# Patient Record
Sex: MALE | Race: BLACK OR AFRICAN AMERICAN | NOT HISPANIC OR LATINO | Employment: PART TIME | URBAN - NONMETROPOLITAN AREA
[De-identification: names, ages, dates, MRNs, and addresses within clinical notes are randomized per-mention and may not be internally consistent; named-entity substitution may affect disease eponyms.]

---

## 2017-02-02 RX ORDER — TAMSULOSIN HYDROCHLORIDE 0.4 MG/1
1 CAPSULE ORAL NIGHTLY
Status: ON HOLD | COMMUNITY
End: 2017-11-21

## 2017-02-06 ENCOUNTER — OFFICE VISIT (OUTPATIENT)
Dept: GASTROENTEROLOGY | Facility: CLINIC | Age: 67
End: 2017-02-06

## 2017-02-06 VITALS
WEIGHT: 181 LBS | HEART RATE: 57 BPM | DIASTOLIC BLOOD PRESSURE: 70 MMHG | BODY MASS INDEX: 25.34 KG/M2 | HEIGHT: 71 IN | OXYGEN SATURATION: 99 % | SYSTOLIC BLOOD PRESSURE: 128 MMHG

## 2017-02-06 DIAGNOSIS — I10 HTN (HYPERTENSION), BENIGN: ICD-10-CM

## 2017-02-06 DIAGNOSIS — Z12.11 ENCOUNTER FOR SCREENING FOR MALIGNANT NEOPLASM OF COLON: Primary | ICD-10-CM

## 2017-02-06 PROCEDURE — S0260 H&P FOR SURGERY: HCPCS | Performed by: CLINICAL NURSE SPECIALIST

## 2017-02-06 NOTE — PROGRESS NOTES
Chief Complaint   Patient presents with   • Colonoscopy     Subjective   HPI  Geoff Edwards is a 66 y.o. male who presents as a referral for preventative maintenance. He has no complaints of nausea or vomiting. No change in bowels. No wt loss. No BRBPR. No melena. There is a negative family hx for colon cancer. No abdominal pain.  Past Medical History   Diagnosis Date   • Abnormal PSA    • Diabetes mellitus    • Hypertension      Past Surgical History   Procedure Laterality Date   • Appendectomy     • Mandible surgery     • Colonoscopy  11/16/2012     Normal. Repeat in 5 years. Dr. Tez Perdomo     Outpatient Prescriptions Marked as Taking for the 2/6/17 encounter (Office Visit) with MARIA DOLORES Brower   Medication Sig Dispense Refill   • lisinopril (PRINIVIL,ZESTRIL) 10 MG tablet   3     No Known Allergies  Social History     Social History   • Marital status:      Spouse name: N/A   • Number of children: N/A   • Years of education: N/A     Occupational History   • Not on file.     Social History Main Topics   • Smoking status: Never Smoker   • Smokeless tobacco: Never Used   • Alcohol use No   • Drug use: No   • Sexual activity: Defer     Other Topics Concern   • Not on file     Social History Narrative     Family History   Problem Relation Age of Onset   • Heart disease Father    • No Known Problems Mother    • Cancer Sister    • No Known Problems Brother    • No Known Problems Brother    • No Known Problems Sister    • No Known Problems Sister    • No Known Problems Sister    • No Known Problems Sister    • Stomach cancer Sister    • Colon cancer Neg Hx    • Colon polyps Neg Hx      Health Maintenance   Topic Date Due   • HEPATITIS C SCREENING  1950   • MEDICARE ANNUAL WELLNESS  1950   • TDAP/TD VACCINES (1 - Tdap) 06/01/1967   • ZOSTER VACCINE  06/01/2008   • PNEUMOCOCCAL VACCINES (65+ LOW/MEDIUM RISK) (1 of 2 - PCV13) 06/01/2013   • INFLUENZA VACCINE  08/01/2016       REVIEW OF  "SYSTEMS  General: well appearing, no fever chills or sweats, no unexplained wt loss  HEENT: no acute visual or hearing disturbances  Cardiovascular: No chest pain or palpitations  Pulmonary: No shortness of breath, coughing, wheezing or hemoptysis  : No burning, urgency, hematuria, or dysuria  Musculoskeletal: No joint pain or stiffness  Peripheral: no edema  Skin: No lesions or rashes  Neuro: No dizziness, headaches, stroke, syncope  Endocrine: No hot or cold intolerances  Hematological: No blood dyscrasias    Objective   Vitals:    02/06/17 0856   BP: 128/70   Pulse: 57   SpO2: 99%   Weight: 181 lb (82.1 kg)   Height: 71\" (180.3 cm)     Body mass index is 25.24 kg/(m^2).    PHYSICAL EXAM  General: age appropriate well nourished well appearing, no acute distress  Head: normocephalic and atraumatic  Global assessment-supple  Neck-No JVD noted, no lymphadenopathy  Pulmonary-clear to auscultation bilaterally, normal respiratory effort  Cardiovascular-normal rate and rhythm, normal heart sounds, S1 and S2 noted  Abdomen-soft, non tender, non distended, normal bowel sounds all 4 quadrants, no hepatosplenomegaly noted  Extremities-No clubbing cyanosis or edema  Neuro-Non focal, converses appropriately, awake, alert, oriented    Assessment/Plan     Geoff was seen today for colonoscopy.    Diagnoses and all orders for this visit:    Encounter for screening for malignant neoplasm of colon  -     Case Request; Standing  -     Implement Anesthesia Orders Day of Procedure; Standing  -     Obtain Informed Consent; Standing  -     Verify Informed Consent; Standing  -     Verify bowel prep was successful; Standing  -     Case Request  -     Case Request; Standing  -     Implement Anesthesia Orders Day of Procedure; Standing  -     Obtain Informed Consent; Standing  -     Verify Informed Consent; Standing  -     Verify bowel prep was successful; Standing  -     Case Request    HTN (hypertension), benign  Comments:  continue BP " medication the day of procedure    Other orders  -     polyethylene glycol (GoLYTELY) 236 G solution; As directed by office instruction        COLONOSCOPY WITH ANESTHESIA (N/A)  Body mass index is 25.24 kg/(m^2).    Patient instructions on prep prior to procedure provided to the patient.    All risks, benefits, alternatives, and indications of colonoscopy procedure have been discussed with the patient. Risks to include perforation of the colon requiring possible surgery or colostomy, risk of bleeding from biopsies or removal of colon tissue, possibility of missing a colon polyp or cancer, or adverse drug reaction.  Benefits to include the diagnosis and management of disease of the colon and rectum. Alternatives to include barium enema, radiographic evaluation, lab testing or no intervention. Pt verbalizes understanding and agrees.

## 2017-04-21 ENCOUNTER — TELEPHONE (OUTPATIENT)
Dept: GASTROENTEROLOGY | Facility: CLINIC | Age: 67
End: 2017-04-21

## 2017-04-21 NOTE — TELEPHONE ENCOUNTER
Spoke with PT. He thought his procedure was scheduled for Nov. I looked but did not see this.  I forwarded call to Miracle so he may schedule his colonoscopy. Open case request from 2/6/17. Will re-check with Miracle later.

## 2017-04-21 NOTE — TELEPHONE ENCOUNTER
Patient is not due for colonoscopy until 11/2017.  Cecily Aceves put in a case request for it and it has been deferred.  Recall notice says that he doesn't have to come back into the office prior to appt.

## 2017-11-21 ENCOUNTER — HOSPITAL ENCOUNTER (OUTPATIENT)
Facility: HOSPITAL | Age: 67
Setting detail: HOSPITAL OUTPATIENT SURGERY
Discharge: HOME | End: 2017-11-21
Attending: INTERNAL MEDICINE | Admitting: INTERNAL MEDICINE

## 2017-11-21 ENCOUNTER — ANESTHESIA EVENT (OUTPATIENT)
Dept: GASTROENTEROLOGY | Facility: HOSPITAL | Age: 67
End: 2017-11-21

## 2017-11-21 ENCOUNTER — ANESTHESIA (OUTPATIENT)
Dept: GASTROENTEROLOGY | Facility: HOSPITAL | Age: 67
End: 2017-11-21

## 2017-11-21 VITALS
BODY MASS INDEX: 25.06 KG/M2 | DIASTOLIC BLOOD PRESSURE: 92 MMHG | HEIGHT: 71 IN | WEIGHT: 179 LBS | RESPIRATION RATE: 16 BRPM | OXYGEN SATURATION: 100 % | TEMPERATURE: 98.1 F | HEART RATE: 65 BPM | SYSTOLIC BLOOD PRESSURE: 143 MMHG

## 2017-11-21 DIAGNOSIS — Z12.11 ENCOUNTER FOR SCREENING FOR MALIGNANT NEOPLASM OF COLON: ICD-10-CM

## 2017-11-21 PROCEDURE — 25010000002 PROPOFOL 10 MG/ML EMULSION: Performed by: NURSE ANESTHETIST, CERTIFIED REGISTERED

## 2017-11-21 PROCEDURE — G0121 COLON CA SCRN NOT HI RSK IND: HCPCS | Performed by: INTERNAL MEDICINE

## 2017-11-21 RX ORDER — PROPOFOL 10 MG/ML
VIAL (ML) INTRAVENOUS AS NEEDED
Status: DISCONTINUED | OUTPATIENT
Start: 2017-11-21 | End: 2017-11-21 | Stop reason: SURG

## 2017-11-21 RX ORDER — SODIUM CHLORIDE 9 MG/ML
500 INJECTION, SOLUTION INTRAVENOUS CONTINUOUS PRN
Status: DISCONTINUED | OUTPATIENT
Start: 2017-11-21 | End: 2017-11-21 | Stop reason: HOSPADM

## 2017-11-21 RX ORDER — LIDOCAINE HYDROCHLORIDE 20 MG/ML
INJECTION, SOLUTION INFILTRATION; PERINEURAL AS NEEDED
Status: DISCONTINUED | OUTPATIENT
Start: 2017-11-21 | End: 2017-11-21 | Stop reason: SURG

## 2017-11-21 RX ORDER — SODIUM CHLORIDE 0.9 % (FLUSH) 0.9 %
3 SYRINGE (ML) INJECTION AS NEEDED
Status: DISCONTINUED | OUTPATIENT
Start: 2017-11-21 | End: 2017-11-21 | Stop reason: HOSPADM

## 2017-11-21 RX ADMIN — PROPOFOL 320 MG: 10 INJECTION, EMULSION INTRAVENOUS at 08:43

## 2017-11-21 RX ADMIN — LIDOCAINE HYDROCHLORIDE 50 MG: 20 INJECTION, SOLUTION INFILTRATION; PERINEURAL at 08:43

## 2017-11-21 RX ADMIN — LIDOCAINE HYDROCHLORIDE 0.5 ML: 10 INJECTION, SOLUTION EPIDURAL; INFILTRATION; INTRACAUDAL; PERINEURAL at 07:33

## 2017-11-21 RX ADMIN — SODIUM CHLORIDE 500 ML: 9 INJECTION, SOLUTION INTRAVENOUS at 07:32

## 2017-11-21 NOTE — PLAN OF CARE
Problem: Patient Care Overview (Adult)  Goal: Plan of Care Review  Outcome: Outcome(s) achieved Date Met:  11/21/17 11/21/17 0915   Coping/Psychosocial Response Interventions   Plan Of Care Reviewed With patient;spouse   Patient Care Overview   Progress improving   Outcome Evaluation   Outcome Summary/Follow up Plan D/C CRITERIA MET

## 2017-11-21 NOTE — ANESTHESIA PREPROCEDURE EVALUATION
Anesthesia Evaluation     Patient summary reviewed   history of anesthetic complications: PONV         Airway   Mallampati: II  TM distance: >3 FB  Neck ROM: full  no difficulty expected  Dental - normal exam     Pulmonary    (-) COPD, asthma, sleep apnea, not a smoker  Cardiovascular   Exercise tolerance: good (4-7 METS)    (+) hypertension,       Neuro/Psych  (-) seizures, TIA, CVA  GI/Hepatic/Renal/Endo    (+)  diabetes mellitus (borderline),   (-) liver disease, no renal disease    Musculoskeletal     Abdominal    Substance History      OB/GYN          Other                                        Anesthesia Plan    ASA 2     general   total IV anesthesia  intravenous induction   Anesthetic plan and risks discussed with patient.

## 2017-11-21 NOTE — PLAN OF CARE
Problem: Patient Care Overview (Adult)  Goal: Plan of Care Review  Outcome: Ongoing (interventions implemented as appropriate)    11/21/17 0850   Coping/Psychosocial Response Interventions   Plan Of Care Reviewed With patient   Patient Care Overview   Progress improving   Outcome Evaluation   Outcome Summary/Follow up Plan no noted problems

## 2017-11-21 NOTE — ANESTHESIA POSTPROCEDURE EVALUATION
Patient: Geoff Edwards    Procedure Summary     Date Anesthesia Start Anesthesia Stop Room / Location    11/21/17 0840 0859  [facility] / [facility]       Procedure Diagnosis Surgeon Provider    COLONOSCOPY WITH ANESTHESIA (N/A ) Encounter for screening for malignant neoplasm of colon  (Encounter for screening for malignant neoplasm of colon [Z12.11]) MD Sera Cabrera CRNA          Anesthesia Type: general  Last vitals  BP   112/84 (11/21/17 0915)   Temp   98.1 °F (36.7 °C) (11/21/17 0722)   Pulse   73 (11/21/17 0915)   Resp   20 (11/21/17 0915)     SpO2   100 % (11/21/17 0915)     Post Anesthesia Care and Evaluation    Patient location during evaluation: PHASE II  Patient participation: complete - patient participated  Level of consciousness: awake and alert  Pain score: 0  Pain management: satisfactory to patient  Airway patency: patent  Anesthetic complications: No anesthetic complications  PONV Status: none  Cardiovascular status: acceptable  Respiratory status: acceptable  Hydration status: acceptable

## 2017-11-21 NOTE — PLAN OF CARE
Problem: GI Endoscopy (Adult)  Goal: Signs and Symptoms of Listed Potential Problems Will be Absent or Manageable (GI Endoscopy)  Outcome: Outcome(s) achieved Date Met:  11/21/17

## 2017-11-21 NOTE — PLAN OF CARE
Problem: GI Endoscopy (Adult)  Goal: Signs and Symptoms of Listed Potential Problems Will be Absent or Manageable (GI Endoscopy)  Outcome: Ongoing (interventions implemented as appropriate)    11/21/17 6642   GI Endoscopy   Problems Assessed (GI Endoscopy) all   Problems Present (GI Endoscopy) none

## 2017-11-21 NOTE — PLAN OF CARE
Problem: Patient Care Overview (Adult)  Goal: Adult Individualization and Mutuality  Outcome: Ongoing (interventions implemented as appropriate)    11/21/17 0721   Individualization   Patient Specific Preferences none   Patient Specific Goals none   Patient Specific Interventions none   Mutuality/Individual Preferences   What Anxieties, Fears or Concerns Do You Have About Your Health or Care? none   What Questions Do You Have About Your Health or Care? none   What Information Would Help Us Give You More Personalized Care? none

## 2017-11-21 NOTE — H&P
"[facility]  Pre Procedure History & Physical    Chief Complaint:   Screening    Subjective     HPI:   Here for screening colonoscopy.  See office note dated 2/6/2017    Past Medical History:   Past Medical History:   Diagnosis Date   • Abnormal PSA    • Diabetes mellitus    • Hypertension    • PONV (postoperative nausea and vomiting)        Past Surgical History:  [unfilled]    Family History:  Family History   Problem Relation Age of Onset   • Heart disease Father    • No Known Problems Mother    • Cancer Sister    • No Known Problems Brother    • No Known Problems Brother    • No Known Problems Sister    • No Known Problems Sister    • No Known Problems Sister    • No Known Problems Sister    • Stomach cancer Sister    • Colon cancer Neg Hx    • Colon polyps Neg Hx        Social History:   reports that he has never smoked. He has never used smokeless tobacco. He reports that he does not drink alcohol or use illicit drugs.    Medications:   Prior to Admission medications    Medication Sig Start Date End Date Taking? Authorizing Provider   polyethylene glycol (GoLYTELY) 236 G solution As directed by office instruction 2/6/17  Yes MARIA DOLORES Brower   aspirin 81 MG tablet Take 81 mg by mouth Daily.    Historical Provider, MD   lisinopril (PRINIVIL,ZESTRIL) 10 MG tablet  10/21/16   Historical Provider, MD   tamsulosin (FLOMAX) 0.4 MG capsule 24 hr capsule Take 1 capsule by mouth Every Night.  11/21/17  Historical Provider, MD       Allergies:  Review of patient's allergies indicates no known allergies.    Objective     Blood pressure 156/92, pulse 68, temperature 98.1 °F (36.7 °C), temperature source Temporal Artery , resp. rate 18, height 71\" (180.3 cm), weight 179 lb (81.2 kg), SpO2 99 %.    Physical Exam   Constitutional: Pt is oriented to person, place, and in no distress.   HENT: Mouth/Throat: Oropharynx is clear.   Cardiovascular: Normal rate, regular rhythm.    Pulmonary/Chest: Effort " normal. No respiratory distress. No  wheezes.   Abdominal: Soft. Non-distended.  Skin: Skin is warm and dry.   Psychiatric: Mood, memory, affect and judgment appear normal.     Assessment/Plan     Diagnosis:  Screening colonoscopy    Anticipated Surgical Procedure:    Proceed with colonoscopy as scheduled    The following major R/B/A were discussed with the patient, however the list is not all inclusive . Risk:  Bleeding (immediate and delayed), perforation (rupture or tear), reaction to medication, missed lesion/cancer, pain during the procedure, infection, need for surgery, need for ostomy, need for mechanical ventilation (breathing machine), death.  Benefits: removal of polyp/tissue, burn/clip/or inject to stop bleeding, removal of foreign body, dilate any stricture.  Alternatives: Xray or CT, surgery, do nothing with associated risk   The patient was given time to ask question and received explanation, and agrees to proceed as per History and Physical.   No guarantee given or expressed.    EMR Dragon/transcription disclaimer: Much of this encounter note is an electronic transcription/translation of spoken language to printed text.  The electronic translation of spoken language may permit erroneous, or at times, nonsensical words or phrases to be inadvertently transcribed.  Although I have reviewed the note for such errors, some may still exist.    Tez Perdomo MD  8:37 AM  11/21/2017

## 2017-11-28 ENCOUNTER — TELEPHONE (OUTPATIENT)
Dept: GASTROENTEROLOGY | Facility: CLINIC | Age: 67
End: 2017-11-28

## 2017-12-02 ENCOUNTER — RESULTS ENCOUNTER (OUTPATIENT)
Dept: UROLOGY | Facility: CLINIC | Age: 67
End: 2017-12-02

## 2017-12-02 DIAGNOSIS — R97.20 ELEVATED PSA: ICD-10-CM

## 2017-12-07 LAB
PSA FREE MFR SERPL: 14.6 %
PSA FREE SERPL-MCNC: 1.04 NG/ML
PSA SERPL-MCNC: 7.1 NG/ML (ref 0–4)

## 2017-12-13 ENCOUNTER — OFFICE VISIT (OUTPATIENT)
Dept: UROLOGY | Facility: CLINIC | Age: 67
End: 2017-12-13

## 2017-12-13 VITALS
WEIGHT: 184.2 LBS | HEIGHT: 71 IN | DIASTOLIC BLOOD PRESSURE: 74 MMHG | BODY MASS INDEX: 25.79 KG/M2 | TEMPERATURE: 97.3 F | SYSTOLIC BLOOD PRESSURE: 128 MMHG

## 2017-12-13 DIAGNOSIS — N40.1 BPH WITH URINARY OBSTRUCTION: ICD-10-CM

## 2017-12-13 DIAGNOSIS — N13.8 BPH WITH URINARY OBSTRUCTION: ICD-10-CM

## 2017-12-13 DIAGNOSIS — R97.20 ELEVATED PSA: Primary | ICD-10-CM

## 2017-12-13 LAB
BILIRUB BLD-MCNC: NEGATIVE MG/DL
CLARITY, POC: CLEAR
COLOR UR: YELLOW
GLUCOSE UR STRIP-MCNC: NEGATIVE MG/DL
KETONES UR QL: NEGATIVE
LEUKOCYTE EST, POC: NEGATIVE
NITRITE UR-MCNC: NEGATIVE MG/ML
PH UR: 5 [PH] (ref 5–8)
PROT UR STRIP-MCNC: NEGATIVE MG/DL
RBC # UR STRIP: ABNORMAL /UL
SP GR UR: 1.02 (ref 1–1.03)
UROBILINOGEN UR QL: NORMAL

## 2017-12-13 PROCEDURE — 99214 OFFICE O/P EST MOD 30 MIN: CPT | Performed by: UROLOGY

## 2017-12-13 PROCEDURE — 81003 URINALYSIS AUTO W/O SCOPE: CPT | Performed by: UROLOGY

## 2017-12-13 NOTE — PROGRESS NOTES
Subjective    Mr. Edwards is 67 y.o. male    Chief Complaint: Elevated PSA    History of Present Illness     Elevated PSA  Patient is here with an elevated PSA. The PSA was drawn10 days. He does not have a family history of prostate cancer. His AUA Symptom Score is 7 /35. Voiding symptoms include Incomplete emptying, Frequency, Intermittency, Weakened stream and Nocturia. Denies Urgency and Straining. Voiding symptoms began several years  . These have been gradual in onset. negative--10/12 for PSA of 7.  Previous PSA values are : 7.1 % free 14.6   Benign Prostatic Hypertrophy  Patient complains of lower urinary tract symptoms. He reports frequency, incomplete emptying, intermittency, nocturia one time a night and weak stream. He denies straining and urgency. Patient states symptoms are of mild severity. Onset of symptoms was several years ago and was gradual in onset. His AUA Symptom Score is, 7/35.He reports a history of no complicating symptoms. He denies flank pain, gross hematuria, kidney stones and recurrent UTI.  Patient has tried Watchful waiting with improvement. Last PSA was 7.1 .       The following portions of the patient's history were reviewed and updated as appropriate: allergies, current medications, past family history, past medical history, past social history, past surgical history and problem list.    Review of Systems   Constitutional: Negative for chills and fever.   Gastrointestinal: Negative for abdominal pain, anal bleeding and blood in stool.   Genitourinary: Negative for flank pain and hematuria.         Current Outpatient Prescriptions:   •  lisinopril (PRINIVIL,ZESTRIL) 10 MG tablet, , Disp: , Rfl: 3    Past Medical History:   Diagnosis Date   • Abnormal PSA    • Diabetes mellitus    • Hypertension    • PONV (postoperative nausea and vomiting)        Past Surgical History:   Procedure Laterality Date   • APPENDECTOMY     • COLONOSCOPY  11/16/2012    Normal. Repeat in 5 years. Dr. Reagan  "Conor   • COLONOSCOPY N/A 11/21/2017    Procedure: COLONOSCOPY WITH ANESTHESIA;  Surgeon: Tez Perdomo MD;  Location: [facility] ENDOSCOPY;  Service:    • MANDIBLE SURGERY         Social History     Social History   • Marital status:      Spouse name: N/A   • Number of children: N/A   • Years of education: N/A     Social History Main Topics   • Smoking status: Never Smoker   • Smokeless tobacco: Never Used   • Alcohol use No   • Drug use: No   • Sexual activity: Defer     Other Topics Concern   • None     Social History Narrative       Family History   Problem Relation Age of Onset   • Heart disease Father    • No Known Problems Mother    • Cancer Sister    • No Known Problems Brother    • No Known Problems Brother    • No Known Problems Sister    • No Known Problems Sister    • No Known Problems Sister    • No Known Problems Sister    • Stomach cancer Sister    • Colon cancer Neg Hx    • Colon polyps Neg Hx        Objective    /74  Temp 97.3 °F (36.3 °C)  Ht 180.3 cm (71\")  Wt 83.6 kg (184 lb 3.2 oz)  BMI 25.69 kg/m2    Physical Exam   Constitutional: He is oriented to person, place, and time. He appears well-developed and well-nourished. No distress.   Pulmonary/Chest: Effort normal.   Abdominal: Soft. He exhibits no distension and no mass. There is no tenderness. There is no rebound and no guarding. No hernia.   Neurological: He is alert and oriented to person, place, and time.   Skin: Skin is warm and dry. He is not diaphoretic.   Psychiatric: He has a normal mood and affect.   Vitals reviewed.          Results for orders placed or performed in visit on 12/13/17   POC Urinalysis Dipstick, Automated   Result Value Ref Range    Color Yellow Yellow, Straw, Dark Yellow, Clarissa    Clarity, UA Clear Clear    Glucose, UA Negative Negative, 1000 mg/dL (3+) mg/dL    Bilirubin Negative Negative    Ketones, UA Negative Negative    Specific Gravity  1.025 1.005 - 1.030    Blood, UA Trace (A) Negative    pH, " Urine 5.0 5.0 - 8.0    Protein, POC Negative Negative mg/dL    Urobilinogen, UA Normal Normal    Leukocytes Negative Negative    Nitrite, UA Negative Negative     Assessment and Plan    Diagnoses and all orders for this visit:    Elevated PSA  -     POC Urinalysis Dipstick, Automated    BPH with urinary obstruction    Patient underwent a biopsy in 2012 for PSA of 7.  His PSA today is 7.1 with a percent free 14.6.  This indicates a proximally 35% chance of having a positive prostate biopsy.I had a long discussion with him regarding further management of this situation.  I have recommended a 4K score.  This can help us determine if we need to proceed with MRI ultrasound fusion guided prostate biopsy.  We will draw a 4K score today and he will follow follow based on the results of these.  A rectal exam was not performed today because we are drawing the 4K score.

## 2017-12-18 ENCOUNTER — TELEPHONE (OUTPATIENT)
Dept: UROLOGY | Facility: CLINIC | Age: 67
End: 2017-12-18

## 2017-12-18 NOTE — TELEPHONE ENCOUNTER
Rep from [facility] called regarding this pt's 4K score testing. She states that his form noted he previously has prostate cancer and the test can not be performed due to this. She wants to confirm that his past dx is correct. Her tel is [phone] and the ref # is .

## 2017-12-19 DIAGNOSIS — R97.20 ELEVATED PROSTATE SPECIFIC ANTIGEN (PSA): Primary | ICD-10-CM

## 2018-01-17 ENCOUNTER — HOSPITAL ENCOUNTER (OUTPATIENT)
Dept: GENERAL RADIOLOGY | Facility: HOSPITAL | Age: 68
Discharge: HOME | End: 2018-01-17
Attending: INTERNAL MEDICINE | Admitting: INTERNAL MEDICINE

## 2018-01-17 ENCOUNTER — TRANSCRIBE ORDERS (OUTPATIENT)
Dept: GENERAL RADIOLOGY | Facility: HOSPITAL | Age: 68
End: 2018-01-17

## 2018-01-17 DIAGNOSIS — M87.051 AVASCULAR NECROSIS OF BONE OF HIP, RIGHT (HCC): ICD-10-CM

## 2018-01-17 DIAGNOSIS — M87.051 AVASCULAR NECROSIS OF BONE OF HIP, RIGHT (HCC): Primary | ICD-10-CM

## 2018-01-17 PROCEDURE — 73502 X-RAY EXAM HIP UNI 2-3 VIEWS: CPT

## 2018-12-14 DIAGNOSIS — R97.20 ELEVATED PROSTATE SPECIFIC ANTIGEN (PSA): ICD-10-CM

## 2018-12-15 LAB
PSA FREE MFR SERPL: 17.4 %
PSA FREE SERPL-MCNC: 0.92 NG/ML
PSA SERPL-MCNC: 5.3 NG/ML (ref 0–4)

## 2019-01-03 NOTE — PROGRESS NOTES
Subjective    Mr. Edwards is 68 y.o. male    Chief Complaint: Elevated PSA    History of Present Illness   Elevated PSA  Patient is here with an elevated PSA. The PSA was drawn10 days. He does not have a family history of prostate cancer. His AUA Symptom Score is 6 /35. Voiding symptoms include Incomplete emptying, Frequency, Intermittency, Weakened stream and Nocturia. Denies Urgency and Straining. Voiding symptoms began several years  . These have been gradual in onset. negative--10/12 for PSA of 7.  Previous PSA values are :   Lab Results   Component Value Date    PSA 5.3 (H) 12/14/2018    PSA 7.1 (H) 12/06/2017    PSA 6.4 (H) 11/22/2016         Benign Prostatic Hypertrophy  Patient complains of lower urinary tract symptoms. He reports frequency, incomplete emptying, intermittency, nocturia one time a night and weak stream. He denies straining and urgency. Patient states symptoms are of mild severity. Onset of symptoms was several years ago and was gradual in onset. His AUA Symptom Score is, 6/35.He reports a history of no complicating symptoms. He denies flank pain, gross hematuria, kidney stones and recurrent UTI.  Patient has tried Watchful waiting with improvement. Last PSA was 5.3 .      The following portions of the patient's history were reviewed and updated as appropriate: allergies, current medications, past family history, past medical history, past social history, past surgical history and problem list.    Review of Systems   Constitutional: Negative for chills and fever.   Gastrointestinal: Negative for abdominal pain, anal bleeding and blood in stool.   Genitourinary: Negative for decreased urine volume, difficulty urinating, discharge, dysuria, enuresis, flank pain, frequency, genital sores, hematuria, penile pain, penile swelling, scrotal swelling, testicular pain and urgency.         Current Outpatient Medications:   •  lisinopril (PRINIVIL,ZESTRIL) 10 MG tablet, , Disp: , Rfl: 3    Past  "Medical History:   Diagnosis Date   • Abnormal PSA    • Diabetes mellitus (CMS/HCC)    • Hypertension    • PONV (postoperative nausea and vomiting)        Past Surgical History:   Procedure Laterality Date   • APPENDECTOMY     • COLONOSCOPY  11/16/2012    Normal. Repeat in 5 years. Dr. Tez Perdomo   • COLONOSCOPY N/A 11/21/2017    Procedure: COLONOSCOPY WITH ANESTHESIA;  Surgeon: Tez Perdomo MD;  Location: [facility] ENDOSCOPY;  Service:    • MANDIBLE SURGERY         Social History     Socioeconomic History   • Marital status:      Spouse name: Not on file   • Number of children: Not on file   • Years of education: Not on file   • Highest education level: Not on file   Tobacco Use   • Smoking status: Never Smoker   • Smokeless tobacco: Never Used   Substance and Sexual Activity   • Alcohol use: No   • Drug use: No   • Sexual activity: Defer       Family History   Problem Relation Age of Onset   • Heart disease Father    • No Known Problems Mother    • Cancer Sister    • No Known Problems Brother    • No Known Problems Brother    • No Known Problems Sister    • No Known Problems Sister    • No Known Problems Sister    • No Known Problems Sister    • Stomach cancer Sister    • Colon cancer Neg Hx    • Colon polyps Neg Hx        Objective    Temp 96.9 °F (36.1 °C)   Ht 180.3 cm (71\")   Wt 79.4 kg (175 lb)   BMI 24.41 kg/m²     Physical Exam   Constitutional: He is oriented to person, place, and time. He appears well-developed and well-nourished.   Pulmonary/Chest: Effort normal.   Abdominal: Soft. He exhibits no distension and no mass. There is no tenderness. There is no rebound and no guarding. No hernia.   Genitourinary: Penis normal. Rectal exam shows no mass, no tenderness and anal tone normal. Enlarged: for the age of the patient. Right testis shows no mass, no swelling and no tenderness. Left testis shows no mass, no swelling and no tenderness. No hypospadias. No discharge found.   Genitourinary " Comments:  The urethral meatus normal in position without evidence of stricture. Epididymis without mass or tenderness. Vas Deferens is palpably normal.Anus and perineum without mass or tenderness. The prostate is approximately 60 ml. It is Symmetric, with a Soft consistency. There are no nodules present. . The seminal vesicles are Not palpable due to the size of the prostate.     Neurological: He is alert and oriented to person, place, and time.   Vitals reviewed.          Results for orders placed or performed in visit on 01/04/19   POC Urinalysis Dipstick, Multipro   Result Value Ref Range    Color Yellow Yellow, Straw, Dark Yellow, Clarissa    Clarity, UA Clear Clear    Glucose, UA Negative Negative, 1000 mg/dL (3+) mg/dL    Bilirubin Negative Negative    Ketones, UA Negative Negative    Specific Gravity  1.025 1.005 - 1.030    Blood, UA Trace (A) Negative    pH, Urine 5.5 5.0 - 8.0    Protein, POC Negative Negative mg/dL    Urobilinogen, UA Normal Normal    Nitrite, UA Negative Negative    Leukocytes Negative Negative   Patient's Body mass index is 24.41 kg/m². BMI is within normal parameters. No follow-up required.  Assessment and Plan    Diagnoses and all orders for this visit:    Elevated prostate specific antigen (PSA)  -     POC Urinalysis Dipstick, Multipro  -     PSA, Total & Free; Future    BPH with urinary obstruction    Patient underwent a biopsy in 2012 for PSA of 7.   he had a 4K score of 4 in 2017 which indicates a very low risk prostate cancer.      His PSA today is 5.3 with a percent free of 17.4% and this indicates a 23% chance of having a positive biopsy.  He has had no change in his voiding symptoms are stable and do not require pharmacologic therapy.    I discussed with him his if his PSA were to increase we will proceed with an MRI first to see there is any clinically significant lesions.    He will follow-up with me in 1 year with repeat PSA testing.

## 2019-01-04 ENCOUNTER — OFFICE VISIT (OUTPATIENT)
Dept: UROLOGY | Facility: CLINIC | Age: 69
End: 2019-01-04

## 2019-01-04 VITALS — TEMPERATURE: 96.9 F | BODY MASS INDEX: 24.5 KG/M2 | HEIGHT: 71 IN | WEIGHT: 175 LBS

## 2019-01-04 DIAGNOSIS — N13.8 BPH WITH URINARY OBSTRUCTION: ICD-10-CM

## 2019-01-04 DIAGNOSIS — N40.1 BPH WITH URINARY OBSTRUCTION: ICD-10-CM

## 2019-01-04 DIAGNOSIS — R97.20 ELEVATED PROSTATE SPECIFIC ANTIGEN (PSA): Primary | ICD-10-CM

## 2019-01-04 LAB
BILIRUB BLD-MCNC: NEGATIVE MG/DL
CLARITY, POC: CLEAR
COLOR UR: YELLOW
GLUCOSE UR STRIP-MCNC: NEGATIVE MG/DL
KETONES UR QL: NEGATIVE
LEUKOCYTE EST, POC: NEGATIVE
NITRITE UR-MCNC: NEGATIVE MG/ML
PH UR: 5.5 [PH] (ref 5–8)
PROT UR STRIP-MCNC: NEGATIVE MG/DL
RBC # UR STRIP: ABNORMAL /UL
SP GR UR: 1.02 (ref 1–1.03)
UROBILINOGEN UR QL: NORMAL

## 2019-01-04 PROCEDURE — 81001 URINALYSIS AUTO W/SCOPE: CPT | Performed by: UROLOGY

## 2019-01-04 PROCEDURE — 99213 OFFICE O/P EST LOW 20 MIN: CPT | Performed by: UROLOGY

## 2019-03-05 ENCOUNTER — HOSPITAL ENCOUNTER (OUTPATIENT)
Dept: GENERAL RADIOLOGY | Facility: HOSPITAL | Age: 69
Discharge: HOME | End: 2019-03-05

## 2019-03-05 PROCEDURE — 71046 X-RAY EXAM CHEST 2 VIEWS: CPT

## 2019-10-01 ENCOUNTER — OFFICE VISIT (OUTPATIENT)
Dept: OTOLARYNGOLOGY | Facility: CLINIC | Age: 69
End: 2019-10-01

## 2019-10-01 VITALS
RESPIRATION RATE: 17 BRPM | BODY MASS INDEX: 25.9 KG/M2 | TEMPERATURE: 97.8 F | OXYGEN SATURATION: 99 % | HEIGHT: 71 IN | DIASTOLIC BLOOD PRESSURE: 88 MMHG | SYSTOLIC BLOOD PRESSURE: 142 MMHG | HEART RATE: 56 BPM | WEIGHT: 185 LBS

## 2019-10-01 DIAGNOSIS — R09.89 GLOBUS PHARYNGEUS: ICD-10-CM

## 2019-10-01 DIAGNOSIS — K21.9 GERD WITHOUT ESOPHAGITIS: ICD-10-CM

## 2019-10-01 DIAGNOSIS — R05.9 COUGH: Primary | ICD-10-CM

## 2019-10-01 DIAGNOSIS — J34.2 ACQUIRED DEVIATED NASAL SEPTUM: ICD-10-CM

## 2019-10-01 DIAGNOSIS — J32.9 CHRONIC SINUSITIS, UNSPECIFIED LOCATION: ICD-10-CM

## 2019-10-01 PROCEDURE — 31575 DIAGNOSTIC LARYNGOSCOPY: CPT | Performed by: OTOLARYNGOLOGY

## 2019-10-01 PROCEDURE — 99204 OFFICE O/P NEW MOD 45 MIN: CPT | Performed by: OTOLARYNGOLOGY

## 2019-10-01 RX ORDER — OXYMETAZOLINE HYDROCHLORIDE 0.05 G/100ML
2 SPRAY NASAL 3 TIMES DAILY
Qty: 2 ML | Refills: 0 | Status: SHIPPED | OUTPATIENT
Start: 2019-10-01 | End: 2019-10-04

## 2019-10-01 RX ORDER — FLUTICASONE PROPIONATE 50 MCG
2 SPRAY, SUSPENSION (ML) NASAL 2 TIMES DAILY
Qty: 48 G | Refills: 3 | Status: SHIPPED | OUTPATIENT
Start: 2019-10-01 | End: 2020-08-10

## 2019-10-01 RX ORDER — IRBESARTAN 150 MG/1
150 TABLET ORAL NIGHTLY
COMMUNITY
End: 2020-01-21 | Stop reason: DRUGHIGH

## 2019-10-01 NOTE — PROGRESS NOTES
Fernando Solano Jr, MD     Chief Complaint   Patient presents with   • Cough     Cough        HISTORY OF PRESENT ILLNESS:   Accompanied by:   No one  Geoff Edwards is a  69 y.o. male with cough. He states present 3-4 weeks.  He feels he is not swallowing well.  Patient notes just since MD visit.  Swallowing issues began over the last 6 weeks. He has sensation in throat.  He denies congestion. White mucus.  Coughs mostly throughout the day. Maybe more at night.  Cough occasionally wakes.  He will cough more just before bed.  No fever  Denies heartburn. No reflux therapy  Pt has had ACE stopped with no resolution cough. Stopped one monthago.  CXR obtained.  Smoke- none  Drink none  Rx- none except BP medication change    Review of Systems  Reviewed per patient intake note and confirmed with patient    Past History:  Past Medical History:   Diagnosis Date   • Abnormal PSA    • Diabetes mellitus (CMS/HCC)    • Hypertension    • PONV (postoperative nausea and vomiting)      Past Surgical History:   Procedure Laterality Date   • APPENDECTOMY     • COLONOSCOPY  11/16/2012    Normal. Repeat in 5 years. Dr. Tez Perdomo   • COLONOSCOPY N/A 11/21/2017    Procedure: COLONOSCOPY WITH ANESTHESIA;  Surgeon: Tez Perdomo MD;  Location: [facility] ENDOSCOPY;  Service:    • MANDIBLE SURGERY       Family History   Problem Relation Age of Onset   • Heart disease Father    • No Known Problems Mother    • Cancer Sister    • No Known Problems Brother    • No Known Problems Brother    • No Known Problems Sister    • No Known Problems Sister    • No Known Problems Sister    • No Known Problems Sister    • Stomach cancer Sister    • Colon cancer Neg Hx    • Colon polyps Neg Hx      Social History     Tobacco Use   • Smoking status: Never Smoker   • Smokeless tobacco: Never Used   Substance Use Topics   • Alcohol use: No   • Drug use: No     Outpatient Medications Marked as Taking for the 10/1/19 encounter (Office Visit) with  Fernando Solano Jr., MD   Medication Sig Dispense Refill   • irbesartan (AVAPRO) 150 MG tablet Take 150 mg by mouth Every Night.       Allergies:  Patient has no known allergies.        Vital Signs:   Temp:  [97.8 °F (36.6 °C)] 97.8 °F (36.6 °C)  Heart Rate:  [56] 56  Resp:  [17] 17  BP: (142)/(88) 142/88    EXAMINATION:   CONSTITUTIONAL:    well nourished, well-developed, alert, oriented, in no acute distress     BODY HABITUS:    Normal body habitus    COMMUNICATION:    able to communicate normally, normal voice quality    HEAD:     Normocephalic, without obvious abnormality, atraumatic    FACE:    structure normal, no tenderness present, no lesions/masses, no evidence of trauma    SALIVARY GLANDS:    parotid glands with no tenderness, no swelling, no masses, submandibular glands with normal size, nontender     EYE:    ocular motility normal, eyelids normal, orbits normal, no proptosis, conjunctiva clear, sclera non-icteric, pupils equal, round, reactive to light and accomodation  Color:   brown    HEARING:    response to conversational voice normal    EARS:    Otoscopic exam   normal pinna with no lesions, Canals normal size and shape, Tympanic membranes normal, Ossicular chain intact, Middle ear clear     NOSE EXTERNAL:    APPEARANCE: normal, straight, with good projection, no tenderness, no lesions, no tenderness, good nasal support, patent nares    NOSE INTERNAL:    Nasal endoscopy        See procedure note for details Bilateral    ORAL CAVITY:    Normal lips with no lesions, dentition normal for age, FOM intact without lesions and normal salivary flow, Mucosa intact without lesions, Hard and soft palate normal without lesions    OROPHARYNX:    Direct examination  oropharyngeal mucosa normal, tonsil(s) with normal appearance      NECK:    normal appearance, no masses, no lesions, larynx normal mobility, trachea midline    LYMPH NODES :    no adenopathy    THYROID:    no overt thyromegaly, no tenderness,  nodules or mass present on palpation, position midline    CHEST/RESPIRATORY:    respiratory effort normal, no rales, rubs or wheezing, no stridor, normal appearance to chest    CARDIOVASCULAR:    regular rate and rhythm, no murmurs, gallups, no peripheral edema   Minimal couging    NEURO/PSYCHIATRIC :    oriented appropriately for age, mood normal, affect appropriate, cranial nerves intact grossly (unless specifically described), gait normal for age    RESULTS REVIEW:    I have reviewed the patients old records in the chart.  I have personally reviewed the patient's chest xray report.     Assessment      Problem List Items Addressed This Visit     None      Visit Diagnoses     Cough    -  Primary    Relevant Orders    CT Sinus Without Contrast    Globus pharyngeus        Acquired deviated nasal septum        R to L low mod  L to R mid mod    Chronic sinusitis, unspecified location        Relevant Orders    CT Sinus Without Contrast    GERD without esophagitis             Diagnosis Plan   1. Cough  CT Sinus Without Contrast   2. Globus pharyngeus     3. Acquired deviated nasal septum      R to L low mod  L to R mid mod   4. Chronic sinusitis, unspecified location  CT Sinus Without Contrast   5. GERD without esophagitis         Plan       Medical and surgical options were discussed including observation, continued medical management, medication modification and CT scanning. Risks, benefits and alternatives were discussed and questions were answered. After considering the options, the patient decided to proceed with medication modification and CT scanning.  New Medications Ordered This Visit   Medications   • oxymetazoline (AFRIN) 0.05 % nasal spray     Si sprays into the nostril(s) as directed by provider 3 (Three) Times a Day for 3 days. Use for 3 days then stop     Dispense:  2 mL     Refill:  0   • fluticasone (FLONASE) 50 MCG/ACT nasal spray     Si sprays into the nostril(s) as directed by provider 2 (Two)  Times a Day.     Dispense:  48 g     Refill:  3      Reflux precautions  Afrin x 3 days  Flonase BID  Antacids  Diet  Exercise  Orders Placed This Encounter   Procedures   • CT Sinus Without Contrast          Patient understand(s) and agree(s) with the treatment plan as described.  Return after sinus CT, for Recheck cough and globus.    Fernando Solano Jr, MD  10/01/19  2:56 PM

## 2019-10-01 NOTE — PROGRESS NOTES
Odalis Wong MA   Patient Intake Note    Review of Systems  Review of Systems   Constitutional: Negative for chills, fatigue and fever.   HENT:        See HPI   Eyes: Negative for pain, discharge, redness and itching.   Respiratory: Positive for cough and choking. Negative for wheezing.    Gastrointestinal: Negative for diarrhea, nausea and vomiting.   Musculoskeletal: Negative for neck pain and neck stiffness.   Allergic/Immunologic: Negative for environmental allergies and food allergies.   Neurological: Negative for dizziness and headaches.   Psychiatric/Behavioral: Negative for sleep disturbance.   All other systems reviewed and are negative.      QUALITY MEASURES    Tobacco Use: Screening and Cessation Intervention  Social History    Tobacco Use      Smoking status: Never Smoker      Smokeless tobacco: Never Used        Odalis Wong MA  10/1/2019  2:23 PM

## 2019-10-01 NOTE — PROGRESS NOTES
Fernando Solano Jr, MD     ENT Procedure Note    Anesthesia: topical 4% tetracaine and oxymetazoline mix    Endoscopy Type: Flexible Laryngoscopy    Indications for Procedure:     Procedure Details:    The patient was placed in an upright position.  The laryngoscope was passed bilateral nasal passge.  The nasal cavities, nasopharynx, oropharynx, hypopharynx, and larynx were all examined.  Vocal cords were examined during respiration and phonation.  The following findings were noted:    Findings:   NASAL SCOPE FINDINGS:    Nasal endoscopy  intact mucosa, normal inferior turbinates, septum midline without perforation, secretions clear and normal amount, nares patent, normal nasal airway without obstruction, no lesions  LARYNGOSCOPY FINDINGS :    Normal Nasopharynx, Adenoids, Eustachian tubes, BOT, lingual tonsils, no tongue prolapse, OP walls intact, HP normal and intact, Epiglottis normal, Supraglottis normal, FVC and TVCs normal with no lesions, Subglottis clear    Condition:  Stable.  Patient tolerated procedure well.    Complications:  None    Post-procedure instructions reviewed with Patient  Instructions documented in AVS for patient to review    Fernando Solano Jr, MD  10/01/19  2:57 PM

## 2019-10-01 NOTE — PATIENT INSTRUCTIONS
"Afrin use:  Use 2 puffs each nostril 3 times daily for 3 days only!!  Stop using after 3 days unless instructed to use longer    NASAL SALINE:  Use 2 puffs each nostril 4-6 times daily and more frequently if possible.  You can buy saline spray or you can make your own and use an old spray bottle to administer  Use a humidifier at bedside  Recipe for saline:d  Water                                 1 quart  Salt (table)                        1 tablespoon  Gylcerin (or Margret Syrup)    1 teaspoon  Sodium bicarbonate           1 teaspoon  Sprays or Olga pots are recommended    Nasal steroid use:  Using nasal steroids:  You will be prescribed one of the following nasal steroids: Flonase, Nasacort, Nasonex, Rhinocort, Qnasl, Zetonna  2 puffs each nostril 2 times daily  Start as soon as possible  If you are using Afrin for 3 days with the nasal steroid,  Use Afrin first and wait 10 minutes to allow the nose to open. Then administer nasal steroids.    THE PROBLEM OF ACID REFLUX    In BOTH children and adults, irritating acids may leak from the stomach and come up into the esophagus and throat. This can occur at any time, but occurs more commonly when lying down. When the acid touches the lining of the esophagus, there is irritation and muscle spasm, which can be felt up into the throat. Usually this is felt as \"heartburn\". When scarring of the esophagus occurs, the esophagus becomes less sensitive and the symptoms may only be in the throat.     Some of the symptoms that can occur with acid reflux into the throat include coughing, soreness, burning, hoarseness, throat clearing, excessive mucous, bad taste in the mouth, bad breath, a sensation of a lump in the throat, wheezing, post-nasal drip, choking spells, bleeding and nausea. In a small percentage of people, more serious problems may result, such as pneumonia, ulcers in the larynx (voice box), reduced mobility of the vocal cords and a pouch in the upper esophagus " (diverticulum). In children, the symptoms may be different and include persistent vomiting, bleeding from the esophagus, respiratory problems, pneumonia, asthma, choking spells, swallowing problems and anemia. In some cases, unexplained fussiness and crying is due to reflux.     The following instructions are designed to help neutralize the stomach acid, reduce the production of the acid, promote emptying of the acid from the stomach into the intestines and prevent acid from coming up into the esophagus. You should adopt enough of these changes to alleviate your symptoms. If carrying out these suggestions produces no change, it is imperative that you return so that we can discuss further the problem. More testing may be required, as might medication. It is important to realize that the esophagus takes time to heal, so you should not expect results immediately.    Diet  Most often, the throat symptoms above are due to dietary abuses. Certain foods are known to cause irritation, because they are acids themselves or cause excess production of stomach acid. These should be avoided, if possible. The following is a partial list of foods recognized as troublesome: coffee (even decaffeinated), tea chocolate, cola beverages, citrus beverages (such as 7-Up), caffeine containing beverages, alcohol, citrus fruits and juices, highly spiced foods, fatty foods, candies, nuts, mints, milk and milk products. Some of the worst offenders for promoting stomach acid are milk and beer.     Hard candies, gum, breath fresheners, throat lozenges, cough drops, mouthwashes, gargles, may actually irritate the throat directly (many cough drops and lozenges contain irritants such as oil of eucalyptus and menthol), and can also stimulate acid production directly.     Large amounts of liquid in the diet tend to promote reflux, because liquids tend to come back up more easily than solids.     Meals should be bland and consist of real food, trying  to avoid recreational food. Multiple small meals, rather than one or two large meals helps prevent reflux by not stretching the stomach and increasing the time needed for digestion, as well increasing the amount of acid needed to digest the meal. If you are overweight, losing weight is tremendously helpful.     YOU SHOULD NOT EAT FOR AT LEAST TWO HOURS BEFORE RETIRING AND YOU SHOULD REMAIN SITTING OR STANDING FOR AT LEAST 45 MINUTES AFTER EACH MEAL. This promotes passage of food from the stomach into the intestine.    Posture  Body weight is a significant factor in promoting reflux. Losing weight is beneficial. Pregnancy will markedly increase the symptoms of heartburn and throat pain. You should avoid clothing that fits tightly across the mid-section, as this promotes squeezing of the abdominal contents upward. It is helpful to practice abdominal or diaphragmatic breathing, using the stomach to push out each breath rather than chest expansion. Avoid slumping while sitting, and avoid stooping or straining.     For many, the reflux occurs at night while sleeping. This is the time acid production is the greatest and you are lying down, a position that promotes reflux, thus setting up the irritation that occurs the next morning. One of the most important things you can do is to elevate the head of the bed, in an effort to use gravity to keep the acid in the stomach. This is accomplished by placed blocks or bricks beneath the legs at the head of the bed, raising the head 6-10 inches. Do not make the head so high that you slide down. Pillows are not effective because they cause the body to curl, increasing abdominal pressure and it is difficult to remain upright on them. Additionally, pillows promote sleeping on the back, but it is far more desirable to sleep on the right side or on the stomach, as this allows gas to escape, while preventing the escape of acid material. Children and infants, who are refluxing, should  sleep on their stomachs.  Exercise  Regular exercise is extremely beneficial in promoting good digestion and regularity. The abdominal muscles are toned, which aids in controlling acid problems. However, it is recommended that you not participate in vigorous activity immediately after eating. This causes pressure on an already full stomach, forcing acid up into the esophagus. Regular exercise is encouraged, prior to meals, or two hours after meals.  Antacids  Antacids should be used regularly, as they neutralize excess acid. Gelusil II, Mylanta II, Tums and Rolaids are popular brands. Gaviscon is not an antacid, but floats on top of the stomach acid, preventing esophageal irritation. Care should be used in administering large doses of antacids, especially in children. Many antacid preparations contain magnesium, which promotes frequent bowel movements, while antacids that contain aluminum can be constipating. Tums have a high calcium content that can be used to some advantage in older women with reflux.     Antacid tablets are convenient, but the liquid form tends to work much more quickly. Also remember to check with your physician about interference with other medications. Antacids can slow the absorption of digitalis, Indocin, tetracyclines, fluorides and isoniazid from the intestines. Many medications require the presence of stomach acid to be absorbed.     Initially, antacids should be used 30 minutes after each meal, 2 hours after each meal and at bedtime. This maximizes the neutralization of the stomach acid. Antacids can be used more frequently if symptoms persist, but such extensive use needs to be reviewed with your physician.  Prescription Medications  If the above recommendations are not effectively resolving the symptoms, medications may be prescribed. These are usually in the form of acid production blockers, such as Tagamet, Zantac, Pepcid, or Axid or acid pump inhibitors like Prevacid, Nexium,  Protonix or Prilosec. These drugs help stop acid production in the stomach. Medications such as Reglan can be used to promote stomach emptying.  Medications That Promote Reflux  Certain medications can promote acid reflux; either by relaxing the sphincter muscle that helps keeps stomach acid down, or increasing the acid production. These include progesterone (Provera, Ortho Novum, Ovral, other birth control pills), theophylline (Zander-Dur, etc.), anticholinergic (Donnatal, Scopolamine, Probanthine, Bentil, others), beta blockers (Inderal, Tenormin and Corgard), alpha blockers (Dibenzyline), dopamine, calcium channel blockers (Procardia, Cardizem, Isotin, Calan), aspirin, non-steroidal anti-inflammatory drugs (Motrin, Advil, Nuprin, Nalfon, Rufen, Indocin, Feldene, Clinoril and Tolectin). Vitamin C is an acid and can promote reflux. This is only a partial list and before stopping any prescription medication, you should check with your physician.    Goal  The goal is to reduce the symptoms with the least number of lifestyle changes. A combination of the above suggestions is frequently prescribed to return you to normal as quickly as possible. However, this problem did not develop overnight and will not disappear rapidly. Therefore, developing a regimen will aid in controlling symptoms and keep you symptom free for a long period of time. Other alternatives exist, should these suggestions fail, and can be discussed with your physician.     To Summarize:  Watch your diet, avoid foods that cause acid reflux  Lose weight  Avoid tight fitting clothes  Adjust your posture, raise the head of the bed  Exercise regularly  Use antacids  Use prescription medication as directed  Avoid medications that promote reflux  Avoid behavior and eating habits that promote reflux of stomach acid     You are welcome to do a Google search on the topic of reflux and reflux diets. The internet has many useful resources.     Please remember, your  success in controlling this condition depends on many factors including diet, exercise, medications and follow up. All are important and must be utilized to achieve success.     ANTACID USE:  Use antacids 30 mins after every meal, 2 hours after every meal and at Bedtime  Use Gaviscon Extra (best), Tums, Rolaids, etc  Over the counter  Use 2 tsp or 2 tabs as the dose  Remember that some of these products contain Calcium or Aluminum. If you have dietary or medical issues, please inform physician    CT sinus ordered

## 2019-10-22 ENCOUNTER — HOSPITAL ENCOUNTER (OUTPATIENT)
Dept: CT IMAGING | Facility: HOSPITAL | Age: 69
Discharge: HOME | End: 2019-10-22
Admitting: OTOLARYNGOLOGY

## 2019-10-22 DIAGNOSIS — J32.9 CHRONIC SINUSITIS, UNSPECIFIED LOCATION: ICD-10-CM

## 2019-10-22 DIAGNOSIS — R05.9 COUGH: ICD-10-CM

## 2019-10-22 PROCEDURE — 70486 CT MAXILLOFACIAL W/O DYE: CPT

## 2019-10-23 ENCOUNTER — OFFICE VISIT (OUTPATIENT)
Dept: OTOLARYNGOLOGY | Facility: CLINIC | Age: 69
End: 2019-10-23

## 2019-10-23 VITALS
SYSTOLIC BLOOD PRESSURE: 146 MMHG | HEART RATE: 60 BPM | WEIGHT: 184.4 LBS | TEMPERATURE: 96.8 F | DIASTOLIC BLOOD PRESSURE: 94 MMHG | BODY MASS INDEX: 25.81 KG/M2 | HEIGHT: 71 IN

## 2019-10-23 DIAGNOSIS — J34.2 ACQUIRED DEVIATED NASAL SEPTUM: ICD-10-CM

## 2019-10-23 DIAGNOSIS — R09.89 GLOBUS PHARYNGEUS: ICD-10-CM

## 2019-10-23 DIAGNOSIS — J32.0 CHRONIC MAXILLARY SINUSITIS: ICD-10-CM

## 2019-10-23 DIAGNOSIS — K21.9 GERD WITHOUT ESOPHAGITIS: ICD-10-CM

## 2019-10-23 DIAGNOSIS — R05.9 COUGH: Primary | ICD-10-CM

## 2019-10-23 PROCEDURE — 99213 OFFICE O/P EST LOW 20 MIN: CPT | Performed by: OTOLARYNGOLOGY

## 2019-10-23 NOTE — PROGRESS NOTES
Diana Gonzalez LPN   Patient Intake Note    Review of Systems  Review of Systems   Constitutional: Negative for chills, fatigue and fever.   HENT:        See HPI   Respiratory: Positive for cough. Negative for choking and shortness of breath.    Gastrointestinal: Negative for diarrhea, nausea and vomiting.   Neurological: Negative for dizziness, light-headedness and headaches.   Psychiatric/Behavioral: Negative for sleep disturbance.       QUALITY MEASURES    Tobacco Use: Screening and Cessation Intervention  Social History    Tobacco Use      Smoking status: Never Smoker      Smokeless tobacco: Never Used        Diana Gonzalez LPN  10/23/2019  9:29 AM

## 2019-10-23 NOTE — PROGRESS NOTES
Fernando Solano Jr, MD     FOLLOW UP NOTE     Chief Complaint   Patient presents with   • Follow-up        HISTORY OF PRESENT ILLNESS:   Accompanied by:  No one  Geoff Edwards is a  69 y.o. male who is here for follow up. He is slightly better.  He still has drainage. Swallowing is not much better.   CT scan reviewed with patient.    Review of Systems  Reviewed per patient intake note and confirmed by me    Past History:  Past medical and surgical history, family history and social history reviewed and updated when appropriate.  Current medications and allergies reviewed and updated when appropriate.  Allergies:  Patient has no known allergies.        Vital Signs:   Temp:  [96.8 °F (36 °C)] 96.8 °F (36 °C)  Heart Rate:  [60] 60  BP: (146)/(94) 146/94    EXAMINATION:   CONSTITUTIONAL:    well nourished, well-developed, alert, oriented, in no acute distress      BODY HABITUS:    Normal body habitus     COMMUNICATION:    able to communicate normally, normal voice quality     HEAD:     Normocephalic, without obvious abnormality, atraumatic     FACE:    structure normal, no tenderness present, no lesions/masses, no evidence of trauma     SALIVARY GLANDS:    parotid glands with no tenderness, no swelling, no masses, submandibular glands with normal size, nontender      EYE:    ocular motility normal, eyelids normal, orbits normal, no proptosis, conjunctiva clear, sclera non-icteric, pupils equal, round, reactive to light and accomodation  Color:   brown  Arcus- OU moderate     HEARING:    response to conversational voice normal     EARS:    Otoscopic exam   normal pinna with no lesions, Canals normal size and shape, Tympanic membranes normal, Ossicular chain intact, Middle ear clear     NOSE EXTERNAL:    APPEARANCE: normal, straight, with good projection, no tenderness, no lesions, no tenderness, good nasal support, patent nares     INTERNAL NOSE:     Anterior rhinoscopy   NASALMUCOSA:    abnormal:         Bilateral- pale, dry    NASAL PASSAGES:     abnormal   Left- narrowed, Right- partially obstructed by septum   NASAL VALVE:     intact, good support and no nasal obstruction   SEPTUM:     mucosa abnormal   Bilateral- pale, dry     deviation present:      deviated Right low mod   INFERIOR TURBINATES:     normal size, non-obstructing, no lesions   SECRETIONS:     abnormal Bilateral- minimal, clear      ORAL CAVITY:    Normal lips with no lesions, dentition normal for age, FOM intact without lesions and normal salivary flow, Mucosa intact without lesions, Hard and soft palate normal without lesions     OROPHARYNX:    Direct examination  oropharyngeal mucosa normal, tonsil(s) with normal appearance       NECK:    normal appearance, no masses, no lesions, larynx normal mobility, trachea midline     LYMPH NODES :    no adenopathy     THYROID:    no overt thyromegaly, no tenderness, nodules or mass present on palpation, position midline     CHEST/RESPIRATORY:    respiratory effort normal, no rales, rubs or wheezing, no stridor, normal appearance to chest     CARDIOVASCULAR:    regular rate and rhythm, no murmurs, gallups, no peripheral edema   Minimal couging     NEURO/PSYCHIATRIC :    oriented appropriately for age, mood normal, affect appropriate, cranial nerves intact grossly (unless specifically described), gait normal for age    RESULTS REVIEW:    I have personally reviewed the patient's ct of the sinus images.  I have personally reviewed the patient's ct scan of the sinuses without contrast report.    CT sinus 10/22/2019       ASSESSMENT:      Diagnosis Plan   1. Cough      Mildly improved   2. Globus pharyngeus      mild improved   3. Acquired deviated nasal septum     4. Chronic maxillary sinusitis      R side with extension into Ethmoid, infundibulum   5. GERD without esophagitis      Improved control           PLAN:   Medical and surgical options were discussed including observation, continued medical management,  medication modification and surgical management. Risks, benefits and alternatives were discussed and questions were answered. After considering the options, the patient decided to proceed with medication modification.  Patient has mild reduction in symptoms. He wishes to continue medications. I discussed R sided sinus surgery. He will consider if medications no more effective at next visit.  Flonase BID  Nasal saline  LIVIA regimen  MY CHART:  Patient is Encouraged to enroll in My Chart  Encouraged to review data and findings in My Chart          Patient understand(s) and agree(s) with the treatment plan as described.    Return in about 3 months (around 1/23/2020) for Recheck cough, R max sinus, Flex scope.     Fernando Solano Jr, MD  10/23/19  9:49 AM

## 2019-10-23 NOTE — PATIENT INSTRUCTIONS
Continue reflux precautions    Continue nasal steroids 2 times daily    Nasal saline    Thank you for enrolling in [employer]. Please follow the instructions below to securely access your online medical record. [employer] allows you to send messages to your doctor, view your test results, renew your prescriptions, schedule appointments, and more.    How Do I Sign Up?  1. In your Internet browser, go to [email or web address]  2. Click on the Sign Up Now link in the New User? box.   3. Enter your [employer] Activation Code exactly as it appears below. You will not need to use this code after you have completed the sign-up process. If you do not sign up before the expiration date, you must request a new code.  [employer] Activation Code: [email or web address]  Expires: 10/31/2019  3:02 PM    4. Enter the last four digits of your Social Security Number and your Date of Birth as indicated and click Next. You will be taken to the next sign-up page.  5. Create a [employer] username. Think of one that is secure and easy to remember.  6. Create a [employer] password. You can change your password at any time.  7. Choose a security question, enter your answer, and click Next. This can be used to access [employer] if you forget your password.   8. Select your communication preference. Enter a valid e-mail address if you would like to receive e-mail notifications when new information is available in [employer].  9. Click Sign In. You can now view your medical record.     Additional Information  If you have questions, you can e-mail [email or web address], or call [phone] to talk to our [employer] staff. Remember, [employer] is NOT to be used for urgent needs. For medical emergencies, dial 911.

## 2020-01-03 ENCOUNTER — RESULTS ENCOUNTER (OUTPATIENT)
Dept: UROLOGY | Facility: CLINIC | Age: 70
End: 2020-01-03

## 2020-01-03 DIAGNOSIS — R97.20 ELEVATED PROSTATE SPECIFIC ANTIGEN (PSA): ICD-10-CM

## 2020-01-07 DIAGNOSIS — R97.20 ELEVATED PROSTATE SPECIFIC ANTIGEN (PSA): Primary | ICD-10-CM

## 2020-01-18 ENCOUNTER — APPOINTMENT (OUTPATIENT)
Dept: GENERAL RADIOLOGY | Facility: HOSPITAL | Age: 70
End: 2020-01-18

## 2020-01-18 ENCOUNTER — HOSPITAL ENCOUNTER (EMERGENCY)
Facility: HOSPITAL | Age: 70
Discharge: HOME | End: 2020-01-18
Admitting: EMERGENCY MEDICINE

## 2020-01-18 VITALS
HEART RATE: 52 BPM | HEIGHT: 71 IN | BODY MASS INDEX: 24.5 KG/M2 | WEIGHT: 175 LBS | OXYGEN SATURATION: 98 % | SYSTOLIC BLOOD PRESSURE: 166 MMHG | TEMPERATURE: 98.3 F | DIASTOLIC BLOOD PRESSURE: 85 MMHG | RESPIRATION RATE: 17 BRPM

## 2020-01-18 DIAGNOSIS — M25.512 ACUTE PAIN OF LEFT SHOULDER: Primary | ICD-10-CM

## 2020-01-18 PROCEDURE — 96372 THER/PROPH/DIAG INJ SC/IM: CPT

## 2020-01-18 PROCEDURE — 25010000002 DEXAMETHASONE PER 1 MG: Performed by: NURSE PRACTITIONER

## 2020-01-18 PROCEDURE — 73030 X-RAY EXAM OF SHOULDER: CPT

## 2020-01-18 PROCEDURE — 25010000002 KETOROLAC TROMETHAMINE PER 15 MG: Performed by: NURSE PRACTITIONER

## 2020-01-18 PROCEDURE — 99283 EMERGENCY DEPT VISIT LOW MDM: CPT

## 2020-01-18 RX ORDER — DEXAMETHASONE SODIUM PHOSPHATE 10 MG/ML
10 INJECTION INTRAMUSCULAR; INTRAVENOUS ONCE
Status: COMPLETED | OUTPATIENT
Start: 2020-01-18 | End: 2020-01-18

## 2020-01-18 RX ORDER — NAPROXEN 500 MG/1
500 TABLET ORAL 2 TIMES DAILY WITH MEALS
Qty: 20 TABLET | Refills: 0 | Status: SHIPPED | OUTPATIENT
Start: 2020-01-18 | End: 2020-01-28

## 2020-01-18 RX ORDER — METHYLPREDNISOLONE 4 MG/1
TABLET ORAL
Qty: 21 TABLET | Refills: 0 | Status: SHIPPED | OUTPATIENT
Start: 2020-01-18 | End: 2020-03-18

## 2020-01-18 RX ORDER — KETOROLAC TROMETHAMINE 30 MG/ML
30 INJECTION, SOLUTION INTRAMUSCULAR; INTRAVENOUS ONCE
Status: COMPLETED | OUTPATIENT
Start: 2020-01-18 | End: 2020-01-18

## 2020-01-18 RX ADMIN — DEXAMETHASONE SODIUM PHOSPHATE 10 MG: 10 INJECTION INTRAMUSCULAR; INTRAVENOUS at 21:47

## 2020-01-18 RX ADMIN — KETOROLAC TROMETHAMINE 30 MG: 30 INJECTION, SOLUTION INTRAMUSCULAR at 21:47

## 2020-01-19 NOTE — ED TRIAGE NOTES
PATIENT PRESENTS WITH LEFT SHOULDER PAIN THAT HAS BEEN HURTING ALL DAY. PATIENT REPORTS HE HAS BEEN PUTTING A TV CABINET TOGETHER AND BEEN USING HIS ARMS MORE THAN NORMAL.

## 2020-01-19 NOTE — ED PROVIDER NOTES
Subjective   69 yom c/o left shoulder pain.  He states he has been lifting heavy objects and believes he has strained the shoulder.  No known injury.  He has good ROM of the left shoulder. +PMS of the LUE present.            Review of Systems   Constitutional: Negative for activity change, appetite change, fatigue and fever.   HENT: Negative for congestion, ear pain, facial swelling and sore throat.    Eyes: Negative for discharge and visual disturbance.   Respiratory: Negative for apnea, chest tightness, shortness of breath, wheezing and stridor.    Cardiovascular: Negative for chest pain and palpitations.   Gastrointestinal: Negative for abdominal distention, abdominal pain, diarrhea, nausea and vomiting.   Genitourinary: Negative for difficulty urinating and dysuria.   Musculoskeletal: Negative for arthralgias and myalgias.   Skin: Negative for rash and wound.   Neurological: Negative for dizziness and seizures.   Psychiatric/Behavioral: Negative for agitation and confusion.       Past Medical History:   Diagnosis Date   • Abnormal PSA    • Diabetes mellitus (CMS/HCC)    • Hypertension    • PONV (postoperative nausea and vomiting)        No Known Allergies    Past Surgical History:   Procedure Laterality Date   • APPENDECTOMY     • COLONOSCOPY  11/16/2012    Normal. Repeat in 5 years. Dr. Tez Perdomo   • COLONOSCOPY N/A 11/21/2017    Procedure: COLONOSCOPY WITH ANESTHESIA;  Surgeon: Tez Perdomo MD;  Location: [facility] ENDOSCOPY;  Service:    • MANDIBLE SURGERY         Family History   Problem Relation Age of Onset   • Heart disease Father    • No Known Problems Mother    • Cancer Sister    • No Known Problems Brother    • No Known Problems Brother    • No Known Problems Sister    • No Known Problems Sister    • No Known Problems Sister    • No Known Problems Sister    • Stomach cancer Sister    • Colon cancer Neg Hx    • Colon polyps Neg Hx        Social History     Socioeconomic History   • Marital status:       Spouse name: Not on file   • Number of children: Not on file   • Years of education: Not on file   • Highest education level: Not on file   Tobacco Use   • Smoking status: Never Smoker   • Smokeless tobacco: Never Used   Substance and Sexual Activity   • Alcohol use: No   • Drug use: No   • Sexual activity: Defer           Objective   Physical Exam   Constitutional: He is oriented to person, place, and time. He appears well-developed.   HENT:   Head: Normocephalic.   Eyes: Pupils are equal, round, and reactive to light. EOM are normal.   Neck: Normal range of motion. Neck supple.   Cardiovascular: Normal rate and regular rhythm.   No murmur heard.  Pulmonary/Chest: Effort normal and breath sounds normal.   Abdominal: Soft. Bowel sounds are normal.   Musculoskeletal: Normal range of motion.        Left shoulder: He exhibits pain. He exhibits normal range of motion, no tenderness, no bony tenderness, no swelling, no effusion, no deformity and no laceration.   Neurological: He is alert and oriented to person, place, and time.   Skin: Skin is warm and dry.   Psychiatric: He has a normal mood and affect.   Nursing note and vitals reviewed.      Procedures           ED Course              No current facility-administered medications for this encounter.     Current Outpatient Medications:   •  fluticasone (FLONASE) 50 MCG/ACT nasal spray, 2 sprays into the nostril(s) as directed by provider 2 (Two) Times a Day., Disp: 48 g, Rfl: 3  •  irbesartan (AVAPRO) 150 MG tablet, Take 150 mg by mouth Every Night., Disp: , Rfl:   •  lisinopril (PRINIVIL,ZESTRIL) 10 MG tablet, , Disp: , Rfl: 3  •  methylPREDNISolone (MEDROL, JULIANNA,) 4 MG tablet, Take as directed on package instructions., Disp: 21 tablet, Rfl: 0  •  naproxen (NAPROSYN) 500 MG tablet, Take 1 tablet by mouth 2 (Two) Times a Day With Meals for 10 days., Disp: 20 tablet, Rfl: 0    Vital signs:  /85 (BP Location: Right arm, Patient Position: Sitting)   Pulse  "52   Temp 98.3 °F (36.8 °C) (Oral)   Resp 17   Ht 180.3 cm (71\")   Wt 79.4 kg (175 lb)   SpO2 98%   BMI 24.41 kg/m²        ED LAB RESULTS:   Lab Results (last 24 hours)     ** No results found for the last 24 hours. **             IMAGING RESULTS  XR Shoulder 2+ View Left   ED Interpretation   See results below      Final Result   No acute osseous abnormality. Mild/moderate AC joint   arthrosis.   This report was finalized on 01/18/2020 21:58 by Dr. Paul Franklin MD.                                                       MDM  Number of Diagnoses or Management Options  Acute pain of left shoulder: minor     Amount and/or Complexity of Data Reviewed  Tests in the radiology section of CPT®: ordered and reviewed  Independent visualization of images, tracings, or specimens: yes    Risk of Complications, Morbidity, and/or Mortality  Presenting problems: minimal  Diagnostic procedures: minimal  Management options: minimal    Patient Progress  Patient progress: improved      Final diagnoses:   Acute pain of left shoulder            Shoulders, Juan Trejo, MARIA DOLORES  01/18/20 2344       Shoulders, Juan Trejo, MARIA DOLORES  01/18/20 2344    "

## 2020-01-19 NOTE — DISCHARGE INSTRUCTIONS
Increase fluids. Medication as ordered. Can add tylenol as needed for pain. Monitor blood pressure.  Follow up with PCP Monday - call for appointment. Return to ED if condition does not improve or worsens

## 2020-01-21 ENCOUNTER — OFFICE VISIT (OUTPATIENT)
Dept: INTERNAL MEDICINE | Facility: CLINIC | Age: 70
End: 2020-01-21

## 2020-01-21 VITALS
SYSTOLIC BLOOD PRESSURE: 170 MMHG | WEIGHT: 180 LBS | HEART RATE: 68 BPM | HEIGHT: 71 IN | BODY MASS INDEX: 25.2 KG/M2 | OXYGEN SATURATION: 95 % | DIASTOLIC BLOOD PRESSURE: 90 MMHG

## 2020-01-21 DIAGNOSIS — S46.912S SHOULDER STRAIN, LEFT, SEQUELA: ICD-10-CM

## 2020-01-21 DIAGNOSIS — I10 HTN (HYPERTENSION), BENIGN: Primary | ICD-10-CM

## 2020-01-21 PROCEDURE — 99213 OFFICE O/P EST LOW 20 MIN: CPT | Performed by: INTERNAL MEDICINE

## 2020-01-21 RX ORDER — PERPHENAZINE 16 MG/1
1 TABLET, FILM COATED ORAL DAILY
COMMUNITY
Start: 2019-11-11 | End: 2020-06-16

## 2020-01-21 RX ORDER — PEN NEEDLE, DIABETIC 31 GX5/16"
1 NEEDLE, DISPOSABLE MISCELLANEOUS DAILY
Refills: 3 | COMMUNITY
Start: 2019-10-15 | End: 2021-01-25

## 2020-01-21 RX ORDER — IRBESARTAN 300 MG/1
300 TABLET ORAL DAILY
COMMUNITY
Start: 2019-10-24 | End: 2020-11-09

## 2020-01-21 NOTE — PROGRESS NOTES
Subjective   Geoff Edwards is a 69 y.o. male.   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    Review of Systems   Constitutional: Negative for activity change, appetite change, fatigue, fever, unexpected weight gain and unexpected weight loss.   HENT: Negative for swollen glands, trouble swallowing and voice change.    Eyes: Negative for blurred vision and visual disturbance.   Respiratory: Negative for cough and shortness of breath.    Cardiovascular: Negative for chest pain, palpitations and leg swelling.   Gastrointestinal: Negative for abdominal pain, constipation, diarrhea, nausea, vomiting and indigestion.   Endocrine: Negative for cold intolerance, heat intolerance, polydipsia and polyphagia.   Genitourinary: Negative for dysuria and frequency.   Musculoskeletal: Positive for arthralgias (left shoulder). Negative for back pain, joint swelling and neck pain.   Skin: Negative for color change, rash and skin lesions.   Neurological: Negative for dizziness, weakness, headache, memory problem and confusion.   Hematological: Does not bruise/bleed easily.   Psychiatric/Behavioral: Negative for agitation, hallucinations and suicidal ideas. The patient is not nervous/anxious.        Objective   Vitals:    01/21/20 1509   BP: 170/90   Pulse: 68   SpO2: 95%     Past Medical History:   Diagnosis Date   • Abnormal PSA    • Diabetes mellitus (CMS/HCC)    • Hypertension    • PONV (postoperative nausea and vomiting)       Past Surgical History:   Procedure Laterality Date   • APPENDECTOMY     • COLONOSCOPY  11/16/2012    Normal. Repeat in 5 years.   Tez Perdomo   • COLONOSCOPY N/A 11/21/2017    Procedure: COLONOSCOPY WITH ANESTHESIA;  Surgeon: Tez Perdomo MD;  Location: [facility] ENDOSCOPY;  Service:    • MANDIBLE SURGERY          Current Outpatient Medications:   •  CONTOUR NEXT TEST test strip, 1 strip Daily., Disp: , Rfl:   •  fluticasone (FLONASE) 50 MCG/ACT nasal spray, 2 sprays into the nostril(s) as directed by provider 2 (Two) Times a Day., Disp: 48 g, Rfl: 3  •  irbesartan (AVAPRO) 300 MG tablet, 1 tablet Daily., Disp: , Rfl:   •  LANCETS ULTRA THIN misc, 1 each Daily. for testing, Disp: , Rfl: 3  •  methylPREDNISolone (MEDROL, JULIANNA,) 4 MG tablet, Take as directed on package instructions., Disp: 21 tablet, Rfl: 0  •  naproxen (NAPROSYN) 500 MG tablet, Take 1 tablet by mouth 2 (Two) Times a Day With Meals for 10 days., Disp: 20 tablet, Rfl: 0   Physical Exam   Constitutional: He is oriented to person, place, and time. He appears well-developed and well-nourished.   HENT:   Head: Normocephalic and atraumatic.   Right Ear: External ear normal.   Left Ear: External ear normal.   Nose: Nose normal.   Mouth/Throat: Oropharynx is clear and moist.   Eyes: Pupils are equal, round, and reactive to light. Conjunctivae and EOM are normal.   Neck: Normal range of motion. Neck supple. No thyromegaly present.   Cardiovascular: Normal rate, regular rhythm, normal heart sounds and intact distal pulses.   Pulmonary/Chest: Effort normal and breath sounds normal.   Abdominal: Soft. Bowel sounds are normal.   Musculoskeletal:   Has good ROM in left shoulder   Lymphadenopathy:     He has no cervical adenopathy.   Neurological: He is alert and oriented to person, place, and time.   Skin: Skin is warm and dry.   Psychiatric: He has a normal mood and affect. His behavior is normal. Thought content normal.   Nursing note and vitals reviewed.        Patient's Body mass index is 25.1 kg/m². BMI is within normal parameters. No follow-up required..      Assessment/Plan   Diagnoses  and all orders for this visit:    1. HTN (hypertension), benign (Primary)    2. Shoulder strain, left, sequela

## 2020-01-24 ENCOUNTER — OFFICE VISIT (OUTPATIENT)
Dept: OTOLARYNGOLOGY | Facility: CLINIC | Age: 70
End: 2020-01-24

## 2020-01-24 VITALS
TEMPERATURE: 97.3 F | SYSTOLIC BLOOD PRESSURE: 164 MMHG | BODY MASS INDEX: 25.56 KG/M2 | HEIGHT: 71 IN | DIASTOLIC BLOOD PRESSURE: 95 MMHG | HEART RATE: 64 BPM | WEIGHT: 182.6 LBS

## 2020-01-24 DIAGNOSIS — K21.9 GERD WITHOUT ESOPHAGITIS: ICD-10-CM

## 2020-01-24 DIAGNOSIS — R05.9 COUGH: Primary | ICD-10-CM

## 2020-01-24 DIAGNOSIS — R09.89 GLOBUS PHARYNGEUS: ICD-10-CM

## 2020-01-24 DIAGNOSIS — J34.2 ACQUIRED DEVIATED NASAL SEPTUM: ICD-10-CM

## 2020-01-24 DIAGNOSIS — J32.0 CHRONIC MAXILLARY SINUSITIS: ICD-10-CM

## 2020-01-24 PROCEDURE — 99213 OFFICE O/P EST LOW 20 MIN: CPT | Performed by: OTOLARYNGOLOGY

## 2020-01-24 NOTE — PROGRESS NOTES
Diana Gonzalez LPN   Patient Intake Note    Review of Systems  Review of Systems   Constitutional: Negative for chills, fatigue and fever.   HENT:        See hPI   Respiratory: Negative for cough, choking and shortness of breath.    Gastrointestinal: Negative for diarrhea, nausea and vomiting.   Neurological: Negative for dizziness, light-headedness and headaches.   Psychiatric/Behavioral: Negative for sleep disturbance.       Tobacco Use: Screening and Cessation Intervention  Social History    Tobacco Use      Smoking status: Never Smoker      Smokeless tobacco: Never Used      Diana Gonzalez LPN  1/24/2020  9:09 AM

## 2020-01-24 NOTE — PATIENT INSTRUCTIONS
NASAL SALINE:  Use 2 puffs each nostril 4-6 times daily and more frequently if possible.  You can buy saline spray or you can make your own and use an old spray bottle to administer  Use a humidifier at bedside  Recipe for saline:d  Water                                 1 quart  Salt (table)                        1 tablespoon  Gylcerin (or Margret Syrup)    1 teaspoon  Sodium bicarbonate           1 teaspoon  Sprays or [place] pots are recommended    Nasal steroid use:  Using nasal steroids:  You will be prescribed one of the following nasal steroids: Flonase, Nasacort, Nasonex, Rhinocort, Qnasl, Zetonna  2 puffs each nostril 2 times daily  Start as soon as possible  If you are using Afrin for 3 days with the nasal steroid,  Use Afrin first and wait 10 minutes to allow the nose to open. Then administer nasal steroids.    Stop the Afrin/Oxymetazoline    Thank you for enrolling in [employer]. Please follow the instructions below to securely access your online medical record. [employer] allows you to send messages to your doctor, view your test results, renew your prescriptions, schedule appointments, and more.    How Do I Sign Up?  1. In your Internet browser, go to [employer]  2. Click on the Sign Up Now link in the New User? box.   3. Enter your [employer] Activation Code exactly as it appears below. You will not need to use this code after you have completed the sign-up process. If you do not sign up before the expiration date, you must request a new code.  [employer] Activation Code: [email or web address]  Expires: 2/23/2020  8:51 AM    4. Enter the last four digits of your Social Security Number and your Date of Birth as indicated and click Next. You will be taken to the next sign-up page.  5. Create a [employer] username. Think of one that is secure and easy to remember.  6. Create a [employer] password. You can change your password at any time.  7. Choose a security question, enter your answer, and click Next. This can  be used to access [employer] if you forget your password.   8. Select your communication preference. Enter a valid e-mail address if you would like to receive e-mail notifications when new information is available in [employer]  9. Click Sign In. You can now view your medical record.     Additional Information  If you have questions, you can e-mail [email or web address], or call [phone] to talk to our [employer] staff. Remember, [employer] is NOT to be used for urgent needs. For medical emergencies, dial 911.

## 2020-01-24 NOTE — PROGRESS NOTES
Fernando Solano Jr, MD     ENT FOLLOW UP NOTE     Chief Complaint   Patient presents with   • Follow-up        HISTORY OF PRESENT ILLNESS:  Accompanied by:  No one  Geoff Edwards is a  69 y.o. male who is here for follow up. He has not used medications consistently.  He does not feel better.  He does not wish surgery.  He has no drainage. He does have congestion.  Denies lump in throat.  Not coughing    Review of Systems  Reviewed per patient intake note and confirmed by me    Past History:  Past medical and surgical history, family history and social history reviewed and updated when appropriate.  Current medications and allergies reviewed and updated when appropriate.  Allergies:  Patient has no known allergies.        Vital Signs:   Temp:  [97.3 °F (36.3 °C)] 97.3 °F (36.3 °C)  Heart Rate:  [64] 64  BP: (164)/(95) 164/95  EXAMINATION:  CONSTITUTIONAL:    well nourished, well-developed, alert, oriented, in no acute distress      BODY HABITUS:    Normal body habitus     COMMUNICATION:    able to communicate normally, normal voice quality     HEAD:     Normocephalic, without obvious abnormality, atraumatic     FACE:    structure normal, no tenderness present, no lesions/masses, no evidence of trauma     SALIVARY GLANDS:    parotid glands with no tenderness, no swelling, no masses, submandibular glands with normal size, nontender      EYE:    ocular motility normal, eyelids normal, orbits normal, no proptosis, conjunctiva clear, sclera non-icteric, pupils equal, round, reactive to light and accomodation  Color:   brown  Arcus- OU moderate     HEARING:    response to conversational voice normal     EARS:    Otoscopic exam   normal pinna with no lesions, Canals normal size and shape, Tympanic membranes normal, Ossicular chain intact, Middle ear clear     NOSE EXTERNAL:    APPEARANCE: normal, straight, with good projection, no tenderness, no lesions, no tenderness, good nasal support, patent nares     INTERNAL  NOSE:  Anterior rhinoscopy   NASALMUCOSA:    abnormal:        Bilateral- pale, dry    NASAL PASSAGES:     abnormal   Left- narrowed, Right- partially obstructed by septum   NASAL VALVE:     intact, good support and no nasal obstruction   SEPTUM:     mucosa abnormal   Bilateral- pale, dry     deviation present:      deviated Right low mod   INFERIOR TURBINATES:     normal size, non-obstructing, no lesions   SECRETIONS:     abnormal Bilateral- minimal, clear      ORAL CAVITY:    Normal lips with no lesions, dentition normal for age, FOM intact without lesions and normal salivary flow, Mucosa intact without lesions, Hard and soft palate normal without lesions     OROPHARYNX:    Direct examination  oropharyngeal mucosa normal, tonsil(s) with normal appearance       NECK:    normal appearance, no masses, no lesions, larynx normal mobility, trachea midline     LYMPH NODES :    no adenopathy     THYROID:    no overt thyromegaly, no tenderness, nodules or mass present on palpation, position midline     CHEST/RESPIRATORY:    respiratory effort normal, no rales, rubs or wheezing, no stridor, normal appearance to chest     CARDIOVASCULAR:    regular rate and rhythm, no murmurs, gallups, no peripheral edema   Minimal couging     NEURO/PSYCHIATRIC :    oriented appropriately for age, mood normal, affect appropriate, cranial nerves intact grossly (unless specifically described), gait normal for age     RESULTS REVIEW:    I have personally reviewed the patient's ct of the sinus images.  I have personally reviewed the patient's ct scan of the sinuses without contrast report.     RESULTS REVIEW:    No reports available for review          ASSESSMENT:   Diagnosis Plan   1. Cough      improved   2. Acquired deviated nasal septum      L to R low and mid, mild to mod   3. Chronic maxillary sinusitis      No change   4. GERD without esophagitis      stable   5. Globus pharyngeus      improved           PLAN:  Medical and surgical options  were discussed including observation, continued medical management, medication modification and surgical management. Risks, benefits and alternatives were discussed and questions were answered. After considering the options, the patient decided to proceed with medication modification.  Patient will try to use medications more consistently. He declines surgery today after surgical options discussed.  Flonase BID  Stop Afrin  Nasal saline  MY CHART:  Patient is Encouraged to enroll in My Chart  Encouraged to review data and findings in My Chart          Patient understand(s) and agree(s) with the treatment plan as described.    Return in about 3 months (around 4/24/2020) for Recheck Nose, scope.     Fernando Solano Jr, MD  01/24/20  9:40 AM

## 2020-02-10 ENCOUNTER — RESULTS ENCOUNTER (OUTPATIENT)
Dept: UROLOGY | Facility: CLINIC | Age: 70
End: 2020-02-10

## 2020-02-10 DIAGNOSIS — R97.20 ELEVATED PROSTATE SPECIFIC ANTIGEN (PSA): ICD-10-CM

## 2020-02-11 LAB
PSA FREE MFR SERPL: 14.2 %
PSA FREE SERPL-MCNC: 1.21 NG/ML
PSA SERPL-MCNC: 8.5 NG/ML (ref 0–4)

## 2020-02-11 NOTE — PROGRESS NOTES
Subjective    Mr. Edwards is 69 y.o. male    Chief Complaint: Elevated PSA    History of Present Illness   Elevated PSA  Patient is here with an elevated PSA. The PSA was drawn1 week. He does not have a family history of prostate cancer. His AUA Symptom Score is 6 /35. Voiding symptoms include Incomplete emptying, Frequency, Intermittency, Weakened stream and Nocturia. Denies Urgency and Straining. Voiding symptoms began several years  . These have been gradual in onset. negative--10/12 for PSA of 7.  Previous PSA values are :   Lab Results   Component Value Date    PSA 8.5 (H) 02/10/2020    PSA 5.3 (H) 12/14/2018    PSA 7.1 (H) 12/06/2017                The following portions of the patient's history were reviewed and updated as appropriate: allergies, current medications, past family history, past medical history, past social history, past surgical history and problem list.    Review of Systems   Constitutional: Negative for chills and fever.   Gastrointestinal: Negative for abdominal pain, anal bleeding and blood in stool.   Genitourinary: Negative for decreased urine volume, difficulty urinating, discharge, dysuria, enuresis, flank pain, frequency, genital sores, hematuria, penile pain, penile swelling, scrotal swelling, testicular pain and urgency.         Current Outpatient Medications:   •  CONTOUR NEXT TEST test strip, 1 strip Daily., Disp: , Rfl:   •  fluticasone (FLONASE) 50 MCG/ACT nasal spray, 2 sprays into the nostril(s) as directed by provider 2 (Two) Times a Day., Disp: 48 g, Rfl: 3  •  irbesartan (AVAPRO) 300 MG tablet, 1 tablet Daily., Disp: , Rfl:   •  LANCETS ULTRA THIN misc, 1 each Daily. for testing, Disp: , Rfl: 3  •  methylPREDNISolone (MEDROL, JULIANNA,) 4 MG tablet, Take as directed on package instructions., Disp: 21 tablet, Rfl: 0    Past Medical History:   Diagnosis Date   • Abnormal PSA    • Diabetes mellitus (CMS/HCC)    • Hypertension    • PONV (postoperative nausea and vomiting)   "      Past Surgical History:   Procedure Laterality Date   • APPENDECTOMY     • COLONOSCOPY  11/16/2012    Normal. Repeat in 5 years. Dr. Tez Perdomo   • COLONOSCOPY N/A 11/21/2017    Procedure: COLONOSCOPY WITH ANESTHESIA;  Surgeon: Tez Perdomo MD;  Location: [facility] ENDOSCOPY;  Service:    • MANDIBLE SURGERY         Social History     Socioeconomic History   • Marital status:      Spouse name: Not on file   • Number of children: Not on file   • Years of education: Not on file   • Highest education level: Not on file   Tobacco Use   • Smoking status: Never Smoker   • Smokeless tobacco: Never Used   Substance and Sexual Activity   • Alcohol use: No   • Drug use: No   • Sexual activity: Defer       Family History   Problem Relation Age of Onset   • Heart disease Father    • No Known Problems Mother    • No Known Problems Brother    • No Known Problems Brother    • No Known Problems Sister    • No Known Problems Sister    • No Known Problems Sister    • No Known Problems Sister    • Stomach cancer Sister    • Cancer Sister 69   • Colon cancer Neg Hx    • Colon polyps Neg Hx        Objective    Temp 97.8 °F (36.6 °C)   Ht 180.3 cm (70.98\")   Wt 83 kg (183 lb)   BMI 25.53 kg/m²     Physical Exam   Constitutional: He is oriented to person, place, and time. He appears well-developed and well-nourished.   Pulmonary/Chest: Effort normal.   Abdominal: Soft. He exhibits no distension and no mass. There is no tenderness. There is no rebound and no guarding. No hernia.   Genitourinary: Rectal exam shows no mass, no tenderness and anal tone normal. Enlarged: for the age of the patient. Right testis shows no mass, no swelling and no tenderness. Left testis shows no mass, no swelling and no tenderness. No hypospadias. No discharge found.   Genitourinary Comments:  .Anus and perineum without mass or tenderness. The prostate is approximately 60 ml. It is Symmetric, with a Soft consistency. There are no nodules present. " . The seminal vesicles are Not palpable due to the size of the prostate.     Neurological: He is alert and oriented to person, place, and time.   Vitals reviewed.          Results for orders placed or performed in visit on 02/17/20   POC Urinalysis Dipstick, Multipro   Result Value Ref Range    Color Yellow Yellow, Straw, Dark Yellow, Clarissa    Clarity, UA Clear Clear    Glucose, UA Negative Negative, 1000 mg/dL (3+) mg/dL    Bilirubin Negative Negative    Ketones, UA Negative Negative    Specific Gravity  1.025 1.005 - 1.030    Blood, UA Negative Negative    pH, Urine 5.5 5.0 - 8.0    Protein, POC Negative Negative mg/dL    Urobilinogen, UA Normal Normal    Nitrite, UA Negative Negative    Leukocytes Negative Negative     Assessment and Plan    Diagnoses and all orders for this visit:    Elevated prostate specific antigen (PSA)  -     POC Urinalysis Dipstick, Multipro  -     MRI Pelvis With & Without Contrast; Future    BPH with urinary obstruction    Patient underwent a biopsy in 2012 for PSA of 7.   He had a 4K score of 4 in 2017 which indicates a very low risk prostate cancer.       PSA today is 8.5.  He has had no change in his voiding symptoms are stable and do not require pharmacologic therapy.     I have recommended an MRI of his prostate.  We will then proceed from there.

## 2020-02-17 ENCOUNTER — OFFICE VISIT (OUTPATIENT)
Dept: UROLOGY | Facility: CLINIC | Age: 70
End: 2020-02-17

## 2020-02-17 VITALS — BODY MASS INDEX: 25.62 KG/M2 | TEMPERATURE: 97.8 F | WEIGHT: 183 LBS | HEIGHT: 71 IN

## 2020-02-17 DIAGNOSIS — R97.20 ELEVATED PROSTATE SPECIFIC ANTIGEN (PSA): Primary | ICD-10-CM

## 2020-02-17 DIAGNOSIS — N13.8 BPH WITH URINARY OBSTRUCTION: ICD-10-CM

## 2020-02-17 DIAGNOSIS — N40.1 BPH WITH URINARY OBSTRUCTION: ICD-10-CM

## 2020-02-17 LAB
BILIRUB BLD-MCNC: NEGATIVE MG/DL
CLARITY, POC: CLEAR
COLOR UR: YELLOW
GLUCOSE UR STRIP-MCNC: NEGATIVE MG/DL
KETONES UR QL: NEGATIVE
LEUKOCYTE EST, POC: NEGATIVE
NITRITE UR-MCNC: NEGATIVE MG/ML
PH UR: 5.5 [PH] (ref 5–8)
PROT UR STRIP-MCNC: NEGATIVE MG/DL
RBC # UR STRIP: NEGATIVE /UL
SP GR UR: 1.02 (ref 1–1.03)
UROBILINOGEN UR QL: NORMAL

## 2020-02-17 PROCEDURE — 81003 URINALYSIS AUTO W/O SCOPE: CPT | Performed by: UROLOGY

## 2020-02-17 PROCEDURE — 99214 OFFICE O/P EST MOD 30 MIN: CPT | Performed by: UROLOGY

## 2020-03-06 ENCOUNTER — HOSPITAL ENCOUNTER (OUTPATIENT)
Dept: MRI IMAGING | Facility: HOSPITAL | Age: 70
Discharge: HOME | End: 2020-03-06
Admitting: UROLOGY

## 2020-03-06 DIAGNOSIS — R97.20 ELEVATED PROSTATE SPECIFIC ANTIGEN (PSA): ICD-10-CM

## 2020-03-06 LAB — CREAT BLDA-MCNC: 1.2 MG/DL (ref 0.6–1.3)

## 2020-03-06 PROCEDURE — 72197 MRI PELVIS W/O & W/DYE: CPT

## 2020-03-06 PROCEDURE — A9577 INJ MULTIHANCE: HCPCS | Performed by: UROLOGY

## 2020-03-06 PROCEDURE — 0 GADOBENATE DIMEGLUMINE 529 MG/ML SOLUTION: Performed by: UROLOGY

## 2020-03-06 PROCEDURE — 82565 ASSAY OF CREATININE: CPT

## 2020-03-06 RX ADMIN — GADOBENATE DIMEGLUMINE 16 ML: 529 INJECTION, SOLUTION INTRAVENOUS at 08:56

## 2020-03-12 NOTE — H&P (VIEW-ONLY)
Subjective    Mr. Edwards is 69 y.o. male    Chief Complaint: Elevated PSA  PSA: 2/7/2020    8.5  History of Present Illness    Elevated PSA  Patient is here with an elevated PSA. The PSA was drawn1 week. He does not have a family history of prostate cancer. His AUA Symptom Score is 6 /35. Voiding symptoms include Incomplete emptying, Frequency, Intermittency, Weakened stream and Nocturia. Denies Urgency and Straining. Voiding symptoms began several years  . These have been gradual in onset. negative--10/12 for PSA of 7.    Lab Results   Component Value Date    PSA 8.5 (H) 02/10/2020    PSA 5.3 (H) 12/14/2018    PSA 7.1 (H) 12/06/2017       The following portions of the patient's history were reviewed and updated as appropriate: allergies, current medications, past family history, past medical history, past social history, past surgical history and problem list.    Review of Systems   Constitutional: Negative for chills and fever.   Gastrointestinal: Negative for abdominal pain, anal bleeding and blood in stool.   Genitourinary: Negative for dysuria, frequency, hematuria and urgency.         Current Outpatient Medications:   •  CONTOUR NEXT TEST test strip, 1 strip Daily., Disp: , Rfl:   •  fluticasone (FLONASE) 50 MCG/ACT nasal spray, 2 sprays into the nostril(s) as directed by provider 2 (Two) Times a Day., Disp: 48 g, Rfl: 3  •  irbesartan (AVAPRO) 300 MG tablet, 1 tablet Daily., Disp: , Rfl:   •  LANCETS ULTRA THIN misc, 1 each Daily. for testing, Disp: , Rfl: 3  •  methylPREDNISolone (MEDROL, JULIANNA,) 4 MG tablet, Take as directed on package instructions., Disp: 21 tablet, Rfl: 0    Past Medical History:   Diagnosis Date   • Abnormal PSA    • Diabetes mellitus (CMS/HCC)    • Hypertension    • PONV (postoperative nausea and vomiting)        Past Surgical History:   Procedure Laterality Date   • APPENDECTOMY     • COLONOSCOPY  11/16/2012    Normal. Repeat in 5 years. Dr. Tez Perdomo   • COLONOSCOPY N/A  "11/21/2017    Procedure: COLONOSCOPY WITH ANESTHESIA;  Surgeon: Tez Perdomo MD;  Location: [facility] ENDOSCOPY;  Service:    • MANDIBLE SURGERY         Social History     Socioeconomic History   • Marital status:      Spouse name: Not on file   • Number of children: Not on file   • Years of education: Not on file   • Highest education level: Not on file   Tobacco Use   • Smoking status: Never Smoker   • Smokeless tobacco: Never Used   Substance and Sexual Activity   • Alcohol use: No   • Drug use: No   • Sexual activity: Defer       Family History   Problem Relation Age of Onset   • Heart disease Father    • No Known Problems Mother    • No Known Problems Brother    • No Known Problems Brother    • No Known Problems Sister    • No Known Problems Sister    • No Known Problems Sister    • No Known Problems Sister    • Stomach cancer Sister    • Cancer Sister 69   • Colon cancer Neg Hx    • Colon polyps Neg Hx        Objective    Temp 97.6 °F (36.4 °C)   Ht 180.3 cm (71\")   Wt 84 kg (185 lb 3.2 oz)   BMI 25.83 kg/m²     Physical Exam   Constitutional: He is oriented to person, place, and time. He appears well-developed and well-nourished. No distress.   Pulmonary/Chest: Effort normal.   Abdominal: Soft. He exhibits no distension and no mass. There is no tenderness. There is no rebound and no guarding. No hernia.   Neurological: He is alert and oriented to person, place, and time.   Skin: Skin is warm and dry. He is not diaphoretic.   Psychiatric: He has a normal mood and affect.   Vitals reviewed.          Results for orders placed or performed in visit on 03/13/20   POC Urinalysis Dipstick, Multipro   Result Value Ref Range    Color Yellow Yellow, Straw, Dark Yellow, Clarissa    Clarity, UA Clear Clear    Glucose, UA Negative Negative, 1000 mg/dL (3+) mg/dL    Bilirubin Negative Negative    Ketones, UA Negative Negative    Specific Gravity  1.030 1.005 - 1.030    Blood, UA Negative Negative    pH, Urine 6.5 " 5.0 - 8.0    Protein, POC Negative Negative mg/dL    Urobilinogen, UA Normal Normal    Nitrite, UA Negative Negative    Leukocytes Negative Negative     Assessment and Plan    Geoff was seen today for elevated psa.    Diagnoses and all orders for this visit:    Elevated prostate specific antigen (PSA)  -     POC Urinalysis Dipstick, Multipro  -     Case Request; Standing  -     Case Request    BPH with urinary obstruction    Other orders  -     Follow Anesthesia Guidelines / Standing Orders; Future  -     Obtain informed consent  -     Provide NPO Instructions to Patient; Future        Patient underwent a biopsy in 2012 for PSA of 7.   He had a 4K score of 4 in 2017 which indicates a very low risk prostate cancer.       PSA today is 8.5.  He has had no change in his voiding symptoms are stable and do not require pharmacologic therapy.    MRI showed 45cc gland with 1 cm PIRADS 4 lesion in right mid peripheral zone.      Regarding his PSA and MRI,  I have recommended a transrectal ultrasound guided prostate biopsy with MRI FUSION.  We discussed risks including hematuria,  hematochezia, hematospermia.  I discussed the 4% risk of infection requiring hospitalization.  Also discussed 1% risk of urinary retention.  He will receive IV abx the day of the procedure. The patient voiced understanding of this and wishes to proceed.

## 2020-03-12 NOTE — PROGRESS NOTES
Subjective    Mr. Edwards is 69 y.o. male    Chief Complaint: Elevated PSA  PSA: 2/7/2020    8.5  History of Present Illness    Elevated PSA  Patient is here with an elevated PSA. The PSA was drawn1 week. He does not have a family history of prostate cancer. His AUA Symptom Score is 6 /35. Voiding symptoms include Incomplete emptying, Frequency, Intermittency, Weakened stream and Nocturia. Denies Urgency and Straining. Voiding symptoms began several years  . These have been gradual in onset. negative--10/12 for PSA of 7.    Lab Results   Component Value Date    PSA 8.5 (H) 02/10/2020    PSA 5.3 (H) 12/14/2018    PSA 7.1 (H) 12/06/2017       The following portions of the patient's history were reviewed and updated as appropriate: allergies, current medications, past family history, past medical history, past social history, past surgical history and problem list.    Review of Systems   Constitutional: Negative for chills and fever.   Gastrointestinal: Negative for abdominal pain, anal bleeding and blood in stool.   Genitourinary: Negative for dysuria, frequency, hematuria and urgency.         Current Outpatient Medications:   •  CONTOUR NEXT TEST test strip, 1 strip Daily., Disp: , Rfl:   •  fluticasone (FLONASE) 50 MCG/ACT nasal spray, 2 sprays into the nostril(s) as directed by provider 2 (Two) Times a Day., Disp: 48 g, Rfl: 3  •  irbesartan (AVAPRO) 300 MG tablet, 1 tablet Daily., Disp: , Rfl:   •  LANCETS ULTRA THIN misc, 1 each Daily. for testing, Disp: , Rfl: 3  •  methylPREDNISolone (MEDROL, JULIANNA,) 4 MG tablet, Take as directed on package instructions., Disp: 21 tablet, Rfl: 0    Past Medical History:   Diagnosis Date   • Abnormal PSA    • Diabetes mellitus (CMS/HCC)    • Hypertension    • PONV (postoperative nausea and vomiting)        Past Surgical History:   Procedure Laterality Date   • APPENDECTOMY     • COLONOSCOPY  11/16/2012    Normal. Repeat in 5 years. Dr. Tez Perdomo   • COLONOSCOPY N/A  "11/21/2017    Procedure: COLONOSCOPY WITH ANESTHESIA;  Surgeon: Tez Perdomo MD;  Location: [facility] ENDOSCOPY;  Service:    • MANDIBLE SURGERY         Social History     Socioeconomic History   • Marital status:      Spouse name: Not on file   • Number of children: Not on file   • Years of education: Not on file   • Highest education level: Not on file   Tobacco Use   • Smoking status: Never Smoker   • Smokeless tobacco: Never Used   Substance and Sexual Activity   • Alcohol use: No   • Drug use: No   • Sexual activity: Defer       Family History   Problem Relation Age of Onset   • Heart disease Father    • No Known Problems Mother    • No Known Problems Brother    • No Known Problems Brother    • No Known Problems Sister    • No Known Problems Sister    • No Known Problems Sister    • No Known Problems Sister    • Stomach cancer Sister    • Cancer Sister 69   • Colon cancer Neg Hx    • Colon polyps Neg Hx        Objective    Temp 97.6 °F (36.4 °C)   Ht 180.3 cm (71\")   Wt 84 kg (185 lb 3.2 oz)   BMI 25.83 kg/m²     Physical Exam   Constitutional: He is oriented to person, place, and time. He appears well-developed and well-nourished. No distress.   Pulmonary/Chest: Effort normal.   Abdominal: Soft. He exhibits no distension and no mass. There is no tenderness. There is no rebound and no guarding. No hernia.   Neurological: He is alert and oriented to person, place, and time.   Skin: Skin is warm and dry. He is not diaphoretic.   Psychiatric: He has a normal mood and affect.   Vitals reviewed.          Results for orders placed or performed in visit on 03/13/20   POC Urinalysis Dipstick, Multipro   Result Value Ref Range    Color Yellow Yellow, Straw, Dark Yellow, Clarissa    Clarity, UA Clear Clear    Glucose, UA Negative Negative, 1000 mg/dL (3+) mg/dL    Bilirubin Negative Negative    Ketones, UA Negative Negative    Specific Gravity  1.030 1.005 - 1.030    Blood, UA Negative Negative    pH, Urine 6.5 " 5.0 - 8.0    Protein, POC Negative Negative mg/dL    Urobilinogen, UA Normal Normal    Nitrite, UA Negative Negative    Leukocytes Negative Negative     Assessment and Plan    Geoff was seen today for elevated psa.    Diagnoses and all orders for this visit:    Elevated prostate specific antigen (PSA)  -     POC Urinalysis Dipstick, Multipro  -     Case Request; Standing  -     Case Request    BPH with urinary obstruction    Other orders  -     Follow Anesthesia Guidelines / Standing Orders; Future  -     Obtain informed consent  -     Provide NPO Instructions to Patient; Future        Patient underwent a biopsy in 2012 for PSA of 7.   He had a 4K score of 4 in 2017 which indicates a very low risk prostate cancer.       PSA today is 8.5.  He has had no change in his voiding symptoms are stable and do not require pharmacologic therapy.    MRI showed 45cc gland with 1 cm PIRADS 4 lesion in right mid peripheral zone.      Regarding his PSA and MRI,  I have recommended a transrectal ultrasound guided prostate biopsy with MRI FUSION.  We discussed risks including hematuria,  hematochezia, hematospermia.  I discussed the 4% risk of infection requiring hospitalization.  Also discussed 1% risk of urinary retention.  He will receive IV abx the day of the procedure. The patient voiced understanding of this and wishes to proceed.

## 2020-03-13 ENCOUNTER — OFFICE VISIT (OUTPATIENT)
Dept: UROLOGY | Facility: CLINIC | Age: 70
End: 2020-03-13

## 2020-03-13 VITALS — HEIGHT: 71 IN | WEIGHT: 185.2 LBS | BODY MASS INDEX: 25.93 KG/M2 | TEMPERATURE: 97.6 F

## 2020-03-13 DIAGNOSIS — N40.1 BPH WITH URINARY OBSTRUCTION: ICD-10-CM

## 2020-03-13 DIAGNOSIS — R97.20 ELEVATED PROSTATE SPECIFIC ANTIGEN (PSA): Primary | ICD-10-CM

## 2020-03-13 DIAGNOSIS — N13.8 BPH WITH URINARY OBSTRUCTION: ICD-10-CM

## 2020-03-13 LAB
BILIRUB BLD-MCNC: NEGATIVE MG/DL
CLARITY, POC: CLEAR
COLOR UR: YELLOW
GLUCOSE UR STRIP-MCNC: NEGATIVE MG/DL
KETONES UR QL: NEGATIVE
LEUKOCYTE EST, POC: NEGATIVE
NITRITE UR-MCNC: NEGATIVE MG/ML
PH UR: 6.5 [PH] (ref 5–8)
PROT UR STRIP-MCNC: NEGATIVE MG/DL
RBC # UR STRIP: NEGATIVE /UL
SP GR UR: 1.03 (ref 1–1.03)
UROBILINOGEN UR QL: NORMAL

## 2020-03-13 PROCEDURE — 81003 URINALYSIS AUTO W/O SCOPE: CPT | Performed by: UROLOGY

## 2020-03-13 PROCEDURE — 99214 OFFICE O/P EST MOD 30 MIN: CPT | Performed by: UROLOGY

## 2020-03-13 RX ORDER — SODIUM CHLORIDE 9 MG/ML
100 INJECTION, SOLUTION INTRAVENOUS CONTINUOUS
Status: CANCELLED | OUTPATIENT
Start: 2020-03-13

## 2020-03-18 ENCOUNTER — APPOINTMENT (OUTPATIENT)
Dept: PREADMISSION TESTING | Facility: HOSPITAL | Age: 70
End: 2020-03-18

## 2020-03-18 VITALS
HEART RATE: 58 BPM | BODY MASS INDEX: 26.39 KG/M2 | WEIGHT: 184.3 LBS | DIASTOLIC BLOOD PRESSURE: 80 MMHG | OXYGEN SATURATION: 98 % | HEIGHT: 70 IN | SYSTOLIC BLOOD PRESSURE: 139 MMHG

## 2020-03-18 LAB
ANION GAP SERPL CALCULATED.3IONS-SCNC: 13 MMOL/L (ref 5–15)
BUN BLD-MCNC: 14 MG/DL (ref 8–23)
BUN/CREAT SERPL: 12.8 (ref 7–25)
CALCIUM SPEC-SCNC: 8.9 MG/DL (ref 8.6–10.5)
CHLORIDE SERPL-SCNC: 106 MMOL/L (ref 98–107)
CO2 SERPL-SCNC: 25 MMOL/L (ref 22–29)
CREAT BLD-MCNC: 1.09 MG/DL (ref 0.76–1.27)
DEPRECATED RDW RBC AUTO: 46.6 FL (ref 37–54)
ERYTHROCYTE [DISTWIDTH] IN BLOOD BY AUTOMATED COUNT: 13.4 % (ref 12.3–15.4)
GFR SERPL CREATININE-BSD FRML MDRD: 81 ML/MIN/1.73
GLUCOSE BLD-MCNC: 129 MG/DL (ref 65–99)
HCT VFR BLD AUTO: 42 % (ref 37.5–51)
HGB BLD-MCNC: 14.1 G/DL (ref 13–17.7)
MCH RBC QN AUTO: 31.7 PG (ref 26.6–33)
MCHC RBC AUTO-ENTMCNC: 33.6 G/DL (ref 31.5–35.7)
MCV RBC AUTO: 94.4 FL (ref 79–97)
PLATELET # BLD AUTO: 151 10*3/MM3 (ref 140–450)
PMV BLD AUTO: 12.9 FL (ref 6–12)
POTASSIUM BLD-SCNC: 4 MMOL/L (ref 3.5–5.2)
RBC # BLD AUTO: 4.45 10*6/MM3 (ref 4.14–5.8)
SODIUM BLD-SCNC: 144 MMOL/L (ref 136–145)
WBC NRBC COR # BLD: 5.48 10*3/MM3 (ref 3.4–10.8)

## 2020-03-18 PROCEDURE — 80048 BASIC METABOLIC PNL TOTAL CA: CPT | Performed by: UROLOGY

## 2020-03-18 PROCEDURE — 93010 ELECTROCARDIOGRAM REPORT: CPT | Performed by: INTERNAL MEDICINE

## 2020-03-18 PROCEDURE — 93005 ELECTROCARDIOGRAM TRACING: CPT

## 2020-03-18 PROCEDURE — 85027 COMPLETE CBC AUTOMATED: CPT | Performed by: UROLOGY

## 2020-03-18 PROCEDURE — 36415 COLL VENOUS BLD VENIPUNCTURE: CPT

## 2020-03-18 NOTE — DISCHARGE INSTRUCTIONS
DAY OF SURGERY INSTRUCTIONS        YOUR SURGEON: ***Dr. Tobin    PROCEDURE: ***PROSTATE ULTRASOUND BIOPSY MRI FUSION WITH URONAV    DATE OF SURGERY: ***3/19/2020    ARRIVAL TIME: AS DIRECTED BY OFFICE    YOU MAY TAKE THE FOLLOWING MEDICATION(S) THE MORNING OF SURGERY WITH A SIP OF WATER: ***as directed by your doctor (do not take irbesartan 24 hours prior to surgery per anesthesia)    ALL OTHER HOME MEDICATIONS CHECK WITH YOUR DOCTOR    DO NOT TAKE ANY ERECTILE DYSFUNCTION MEDICATIONS (EX:  CIALIS, VIAGRA) 24 HOURS PRIOR TO SURGERY              MANAGING PAIN AFTER SURGERY    We know you are probably wondering what your pain will be like after surgery.  Following surgery it is unrealistic to expect you will not have pain.   Pain is how our bodies let us know that something is wrong or cautions us to be careful.  That said, our goal is to make your pain tolerable.    Methods we may use to treat your pain include (oral or IV medications, PCAs, epidurals, nerve blocks, etc.)   While some procedures require IV pain medications for a short time after surgery, transitioning to pain medications by mouth allows for better management of pain.   Your nurse will encourage you to take oral pain medications whenever possible.  IV medications work almost immediately, but only last a short while.  Taking medications by mouth allows for a more constant level of medication in your blood stream for a longer period of time.      Once your pain is out of control it is harder to get back under control.  It is important you are aware when your next dose of pain medication is due.  If you are admitted, your nurse may write the time of your next dose on the white board in your room to help you remember.      We are interested in your pain and encourage you to inform us about aggravating factors during your visit.   Many times a simple repositioning every few hours can make a big difference.    If your physician says it is okay, do not let  your pain prevent you from getting out of bed. Be sure to call your nurse for assistance prior to getting up so you do not fall.      Before surgery, please decide your tolerable pain goal.  These faces help describe the pain ratings we use on a 0-10 scale.   Be prepared to tell us your goal and whether or not you take pain or anxiety medications at home.      BEFORE YOU COME TO THE HOSPITAL  (Pre-op instructions)  • Do not eat, drink, smoke or chew gum after midnight the night before surgery.  This also includes no mints.  • Morning of surgery take only the medicines you have been instructed with a sip of water unless otherwise instructed  by your physician.  • Do not shave, wear makeup or dark nail polish.  • Remove all jewelry including rings.  • Leave anything you consider valuable at home.  • Leave your suitcase in the car until after your surgery.  • Bring the following with you if applicable:  o Picture ID and insurance, Medicare or Medicaid cards  o Co-pay/deductible required by insurance (cash, check, credit card)  o Copy of advance directive, living will or power-of- documents if not brought to PAT  o CPAP or BIPAP mask and tubing  o Relaxation aids ( book, magazine), etc.  o Hearing aids                                 ON THE DAY OF SURGERY  · On the day of surgery check in at registration located at the main entrance of the hospital.   ? You will be registered and given a beeper with instructions where to wait in the main lobby.  ? When your beeper lights up and vibrates a member of the Outpatient Surgery staff will meet you at the double doors under the stair steps and escort you to your preoperative room.   · You may have cloth compression devices placed on your legs. These help to prevent blood clots and reduce swelling in your legs.  · An IV may be inserted into one of your veins.  · In the operating room, you may be given one or more of the following:  ? A medicine to help you relax  "(sedative).  ? A medicine to numb the area (local anesthetic).  ? A medicine to make you fall asleep (general anesthetic).  ? A medicine that is injected into an area of your body to numb everything below the injection site (regional anesthetic).  · Your surgical site will be marked or identified.  · You may be given an antibiotic through your IV to help prevent infection.  Contact a health care provider if you:  · Develop a fever of more than 100.4°F (38°C) or other feelings of illness during the 48 hours before your surgery.  · Have symptoms that get worse.  Have questions or concerns about your surgery    General Anesthesia/Surgery, Adult  General anesthesia is the use of medicines to make a person \"go to sleep\" (unconscious) for a medical procedure. General anesthesia must be used for certain procedures, and is often recommended for procedures that:  · Last a long time.  · Require you to be still or in an unusual position.  · Are major and can cause blood loss.  The medicines used for general anesthesia are called general anesthetics. As well as making you unconscious for a certain amount of time, these medicines:  · Prevent pain.  · Control your blood pressure.  · Relax your muscles.  Tell a health care provider about:  · Any allergies you have.  · All medicines you are taking, including vitamins, herbs, eye drops, creams, and over-the-counter medicines.  · Any problems you or family members have had with anesthetic medicines.  · Types of anesthetics you have had in the past.  · Any blood disorders you have.  · Any surgeries you have had.  · Any medical conditions you have.  · Any recent upper respiratory, chest, or ear infections.  · Any history of:  ? Heart or lung conditions, such as heart failure, sleep apnea, asthma, or chronic obstructive pulmonary disease (COPD).  ?  service.  ? Depression or anxiety.  · Any tobacco or drug use, including marijuana or alcohol use.  · Whether you are pregnant or " may be pregnant.  What are the risks?  Generally, this is a safe procedure. However, problems may occur, including:  · Allergic reaction.  · Lung and heart problems.  · Inhaling food or liquid from the stomach into the lungs (aspiration).  · Nerve injury.  · Air in the bloodstream, which can lead to stroke.  · Extreme agitation or confusion (delirium) when you wake up from the anesthetic.  · Waking up during your procedure and being unable to move. This is rare.  These problems are more likely to develop if you are having a major surgery or if you have an advanced or serious medical condition. You can prevent some of these complications by answering all of your health care provider's questions thoroughly and by following all instructions before your procedure.  General anesthesia can cause side effects, including:  · Nausea or vomiting.  · A sore throat from the breathing tube.  · Hoarseness.  · Wheezing or coughing.  · Shaking chills.  · Tiredness.  · Body aches.  · Anxiety.  · Sleepiness or drowsiness.  · Confusion or agitation.  RISKS AND COMPLICATIONS OF SURGERY  Your health care provider will discuss possible risks and complications with you before surgery. Common risks and complications include:    · Problems due to the use of anesthetics.  · Blood loss and replacement (does not apply to minor surgical procedures).  · Temporary increase in pain due to surgery.  · Uncorrected pain or problems that the surgery was meant to correct.  · Infection.  · New damage.    What happens before the procedure?    Medicines  Ask your health care provider about:  · Changing or stopping your regular medicines. This is especially important if you are taking diabetes medicines or blood thinners.  · Taking medicines such as aspirin and ibuprofen. These medicines can thin your blood. Do not take these medicines unless your health care provider tells you to take them.  · Taking over-the-counter medicines, vitamins, herbs, and  supplements. Do not take these during the week before your procedure unless your health care provider approves them.  General instructions  · Starting 3-6 weeks before the procedure, do not use any products that contain nicotine or tobacco, such as cigarettes and e-cigarettes. If you need help quitting, ask your health care provider.  · If you brush your teeth on the morning of the procedure, make sure to spit out all of the toothpaste.  · Tell your health care provider if you become ill or develop a cold, cough, or fever.  · If instructed by your health care provider, bring your sleep apnea device with you on the day of your surgery (if applicable).  · Ask your health care provider if you will be going home the same day, the following day, or after a longer hospital stay.  ? Plan to have someone take you home from the hospital or clinic.  ? Plan to have a responsible adult care for you for at least 24 hours after you leave the hospital or clinic. This is important.  What happens during the procedure?  · You will be given anesthetics through both of the following:  ? A mask placed over your nose and mouth.  ? An IV in one of your veins.  · You may receive a medicine to help you relax (sedative).  · After you are unconscious, a breathing tube may be inserted down your throat to help you breathe. This will be removed before you wake up.  · An anesthesia specialist will stay with you throughout your procedure. He or she will:  ? Keep you comfortable and safe by continuing to give you medicines and adjusting the amount of medicine that you get.  ? Monitor your blood pressure, pulse, and oxygen levels to make sure that the anesthetics do not cause any problems.  The procedure may vary among health care providers and hospitals.  What happens after the procedure?  · Your blood pressure, temperature, heart rate, breathing rate, and blood oxygen level will be monitored until the medicines you were given have worn off.  · You  will wake up in a recovery area. You may wake up slowly.  · If you feel anxious or agitated, you may be given medicine to help you calm down.  · If you will be going home the same day, your health care provider may check to make sure you can walk, drink, and urinate.  · Your health care provider will treat any pain or side effects you have before you go home.  · Do not drive for 24 hours if you were given a sedative.  Summary  · General anesthesia is used to keep you still and prevent pain during a procedure.  · It is important to tell your healthcare provider about your medical history and any surgeries you have had, and previous experience with anesthesia.  · Follow your healthcare provider’s instructions about when to stop eating, drinking, or taking certain medicines before your procedure.  · Plan to have someone take you home from the hospital or clinic.  This information is not intended to replace advice given to you by your health care provider. Make sure you discuss any questions you have with your health care provider.  Document Released: 03/26/2009 Document Revised: 08/03/2018 Document Reviewed: 08/03/2018  [employer] Interactive Patient Education © 2019 [employer]      Fall Prevention in Hospitals, Adult  As a hospital patient, your condition and the treatments you receive can increase your risk for falls. Some additional risk factors for falls in a hospital include:  · Being in an unfamiliar environment.  · Being on bed rest.  · Your surgery.  · Taking certain medicines.  · Your tubing requirements, such as intravenous (IV) therapy or catheters.  It is important that you learn how to decrease fall risks while at the hospital. Below are important tips that can help prevent falls.  SAFETY TIPS FOR PREVENTING FALLS  Talk about your risk of falling.  · Ask your health care provider why you are at risk for falling. Is it your medicine, illness, tubing placement, or something else?  · Make a plan with your  health care provider to keep you safe from falls.  · Ask your health care provider or pharmacist about side effects of your medicines. Some medicines can make you dizzy or affect your coordination.  Ask for help.  · Ask for help before getting out of bed. You may need to press your call button.  · Ask for assistance in getting safely to the toilet.  · Ask for a walker or cane to be put at your bedside. Ask that most of the side rails on your bed be placed up before your health care provider leaves the room.  · Ask family or friends to sit with you.  · Ask for things that are out of your reach, such as your glasses, hearing aids, telephone, bedside table, or call button.  Follow these tips to avoid falling:  · Stay lying or seated, rather than standing, while waiting for help.  · Wear rubber-soled slippers or shoes whenever you walk in the hospital.  · Avoid quick, sudden movements.  ¨ Change positions slowly.  ¨ Sit on the side of your bed before standing.  ¨ Stand up slowly and wait before you start to walk.  · Let your health care provider know if there is a spill on the floor.  · Pay careful attention to the medical equipment, electrical cords, and tubes around you.  · When you need help, use your call button by your bed or in the bathroom. Wait for one of your health care providers to help you.  · If you feel dizzy or unsure of your footing, return to bed and wait for assistance.  · Avoid being distracted by the TV, telephone, or another person in your room.  · Do not lean or support yourself on rolling objects, such as IV poles or bedside tables.     This information is not intended to replace advice given to you by your health care provider. Make sure you discuss any questions you have with your health care provider.     Document Released: 12/15/2001 Document Revised: 01/08/2016 Document Reviewed: 08/25/2013  [employer] Interactive Patient Education ©2016 [employer]            PATIENT/FAMILY/RESPONSIBLE PARTY  VERBALIZES UNDERSTANDING OF ABOVE EDUCATION.  COPY OF PAIN SCALE GIVEN AND REVIEWED WITH VERBALIZED UNDERSTANDING.

## 2020-03-19 ENCOUNTER — ANESTHESIA (OUTPATIENT)
Dept: PERIOP | Facility: HOSPITAL | Age: 70
End: 2020-03-19

## 2020-03-19 ENCOUNTER — ANESTHESIA EVENT (OUTPATIENT)
Dept: PERIOP | Facility: HOSPITAL | Age: 70
End: 2020-03-19

## 2020-03-19 ENCOUNTER — HOSPITAL ENCOUNTER (OUTPATIENT)
Facility: HOSPITAL | Age: 70
Setting detail: HOSPITAL OUTPATIENT SURGERY
Discharge: HOME | End: 2020-03-19
Attending: UROLOGY | Admitting: UROLOGY

## 2020-03-19 VITALS
TEMPERATURE: 97.9 F | RESPIRATION RATE: 18 BRPM | OXYGEN SATURATION: 97 % | SYSTOLIC BLOOD PRESSURE: 140 MMHG | HEART RATE: 55 BPM | DIASTOLIC BLOOD PRESSURE: 91 MMHG

## 2020-03-19 DIAGNOSIS — R97.20 ELEVATED PROSTATE SPECIFIC ANTIGEN (PSA): ICD-10-CM

## 2020-03-19 LAB
GLUCOSE BLDC GLUCOMTR-MCNC: 103 MG/DL (ref 70–130)
GLUCOSE BLDC GLUCOMTR-MCNC: 117 MG/DL (ref 70–130)

## 2020-03-19 PROCEDURE — 25010000002 ONDANSETRON PER 1 MG: Performed by: NURSE ANESTHETIST, CERTIFIED REGISTERED

## 2020-03-19 PROCEDURE — 25010000002 PROPOFOL 10 MG/ML EMULSION: Performed by: NURSE ANESTHETIST, CERTIFIED REGISTERED

## 2020-03-19 PROCEDURE — 82962 GLUCOSE BLOOD TEST: CPT

## 2020-03-19 PROCEDURE — 25010000002 CEFAZOLIN PER 500 MG: Performed by: UROLOGY

## 2020-03-19 PROCEDURE — 55700 PR PROSTATE NEEDLE BIOPSY ANY APPROACH: CPT | Performed by: UROLOGY

## 2020-03-19 PROCEDURE — 25010000002 FENTANYL CITRATE (PF) 100 MCG/2ML SOLUTION: Performed by: NURSE ANESTHETIST, CERTIFIED REGISTERED

## 2020-03-19 PROCEDURE — 25010000002 SUCCINYLCHOLINE PER 20 MG: Performed by: NURSE ANESTHETIST, CERTIFIED REGISTERED

## 2020-03-19 PROCEDURE — 25010000002 DEXAMETHASONE PER 1 MG: Performed by: NURSE ANESTHETIST, CERTIFIED REGISTERED

## 2020-03-19 PROCEDURE — 25010000002 GENTAMICIN PER 80 MG: Performed by: UROLOGY

## 2020-03-19 PROCEDURE — 76942 ECHO GUIDE FOR BIOPSY: CPT | Performed by: UROLOGY

## 2020-03-19 PROCEDURE — G0416 PROSTATE BIOPSY, ANY MTHD: HCPCS | Performed by: UROLOGY

## 2020-03-19 RX ORDER — HYDROCODONE BITARTRATE AND ACETAMINOPHEN 5; 325 MG/1; MG/1
1 TABLET ORAL ONCE AS NEEDED
Status: DISCONTINUED | OUTPATIENT
Start: 2020-03-19 | End: 2020-03-19 | Stop reason: HOSPADM

## 2020-03-19 RX ORDER — MIDAZOLAM HYDROCHLORIDE 1 MG/ML
2 INJECTION INTRAMUSCULAR; INTRAVENOUS
Status: DISCONTINUED | OUTPATIENT
Start: 2020-03-19 | End: 2020-03-19 | Stop reason: HOSPADM

## 2020-03-19 RX ORDER — MIDAZOLAM HYDROCHLORIDE 1 MG/ML
1 INJECTION INTRAMUSCULAR; INTRAVENOUS
Status: DISCONTINUED | OUTPATIENT
Start: 2020-03-19 | End: 2020-03-19 | Stop reason: HOSPADM

## 2020-03-19 RX ORDER — SODIUM CHLORIDE 0.9 % (FLUSH) 0.9 %
3 SYRINGE (ML) INJECTION EVERY 12 HOURS SCHEDULED
Status: DISCONTINUED | OUTPATIENT
Start: 2020-03-19 | End: 2020-03-19 | Stop reason: HOSPADM

## 2020-03-19 RX ORDER — GENTAMICIN SULFATE 80 MG/100ML
80 INJECTION, SOLUTION INTRAVENOUS ONCE
Status: COMPLETED | OUTPATIENT
Start: 2020-03-19 | End: 2020-03-19

## 2020-03-19 RX ORDER — LABETALOL HYDROCHLORIDE 5 MG/ML
5 INJECTION, SOLUTION INTRAVENOUS
Status: DISCONTINUED | OUTPATIENT
Start: 2020-03-19 | End: 2020-03-19 | Stop reason: HOSPADM

## 2020-03-19 RX ORDER — FENTANYL CITRATE 50 UG/ML
INJECTION, SOLUTION INTRAMUSCULAR; INTRAVENOUS AS NEEDED
Status: DISCONTINUED | OUTPATIENT
Start: 2020-03-19 | End: 2020-03-19 | Stop reason: SURG

## 2020-03-19 RX ORDER — SODIUM CHLORIDE, SODIUM LACTATE, POTASSIUM CHLORIDE, CALCIUM CHLORIDE 600; 310; 30; 20 MG/100ML; MG/100ML; MG/100ML; MG/100ML
1000 INJECTION, SOLUTION INTRAVENOUS CONTINUOUS
Status: DISCONTINUED | OUTPATIENT
Start: 2020-03-19 | End: 2020-03-19 | Stop reason: HOSPADM

## 2020-03-19 RX ORDER — IBUPROFEN 600 MG/1
600 TABLET ORAL ONCE AS NEEDED
Status: DISCONTINUED | OUTPATIENT
Start: 2020-03-19 | End: 2020-03-19 | Stop reason: HOSPADM

## 2020-03-19 RX ORDER — FAMOTIDINE 10 MG/ML
20 INJECTION, SOLUTION INTRAVENOUS
Status: COMPLETED | OUTPATIENT
Start: 2020-03-19 | End: 2020-03-19

## 2020-03-19 RX ORDER — CEPHALEXIN 500 MG/1
500 CAPSULE ORAL 3 TIMES DAILY
Qty: 9 CAPSULE | Refills: 0 | Status: SHIPPED | OUTPATIENT
Start: 2020-03-19 | End: 2020-03-22

## 2020-03-19 RX ORDER — ONDANSETRON 2 MG/ML
4 INJECTION INTRAMUSCULAR; INTRAVENOUS ONCE AS NEEDED
Status: COMPLETED | OUTPATIENT
Start: 2020-03-19 | End: 2020-03-19

## 2020-03-19 RX ORDER — SODIUM CHLORIDE 9 MG/ML
100 INJECTION, SOLUTION INTRAVENOUS CONTINUOUS
Status: DISCONTINUED | OUTPATIENT
Start: 2020-03-19 | End: 2020-03-19 | Stop reason: HOSPADM

## 2020-03-19 RX ORDER — LIDOCAINE HYDROCHLORIDE 20 MG/ML
INJECTION, SOLUTION INFILTRATION; PERINEURAL AS NEEDED
Status: DISCONTINUED | OUTPATIENT
Start: 2020-03-19 | End: 2020-03-19 | Stop reason: SURG

## 2020-03-19 RX ORDER — SODIUM CHLORIDE 0.9 % (FLUSH) 0.9 %
3-10 SYRINGE (ML) INJECTION AS NEEDED
Status: DISCONTINUED | OUTPATIENT
Start: 2020-03-19 | End: 2020-03-19 | Stop reason: HOSPADM

## 2020-03-19 RX ORDER — ONDANSETRON 2 MG/ML
4 INJECTION INTRAMUSCULAR; INTRAVENOUS ONCE AS NEEDED
Status: DISCONTINUED | OUTPATIENT
Start: 2020-03-19 | End: 2020-03-19 | Stop reason: HOSPADM

## 2020-03-19 RX ORDER — SODIUM CHLORIDE 0.9 % (FLUSH) 0.9 %
3 SYRINGE (ML) INJECTION AS NEEDED
Status: DISCONTINUED | OUTPATIENT
Start: 2020-03-19 | End: 2020-03-19 | Stop reason: HOSPADM

## 2020-03-19 RX ORDER — DEXAMETHASONE SODIUM PHOSPHATE 4 MG/ML
4 INJECTION, SOLUTION INTRA-ARTICULAR; INTRALESIONAL; INTRAMUSCULAR; INTRAVENOUS; SOFT TISSUE ONCE AS NEEDED
Status: COMPLETED | OUTPATIENT
Start: 2020-03-19 | End: 2020-03-19

## 2020-03-19 RX ORDER — FLUMAZENIL 0.1 MG/ML
0.2 INJECTION INTRAVENOUS AS NEEDED
Status: DISCONTINUED | OUTPATIENT
Start: 2020-03-19 | End: 2020-03-19 | Stop reason: HOSPADM

## 2020-03-19 RX ORDER — SODIUM CHLORIDE, SODIUM LACTATE, POTASSIUM CHLORIDE, CALCIUM CHLORIDE 600; 310; 30; 20 MG/100ML; MG/100ML; MG/100ML; MG/100ML
100 INJECTION, SOLUTION INTRAVENOUS CONTINUOUS
Status: DISCONTINUED | OUTPATIENT
Start: 2020-03-19 | End: 2020-03-19 | Stop reason: HOSPADM

## 2020-03-19 RX ORDER — BUPIVACAINE HCL/0.9 % NACL/PF 0.1 %
2 PLASTIC BAG, INJECTION (ML) EPIDURAL ONCE
Status: COMPLETED | OUTPATIENT
Start: 2020-03-19 | End: 2020-03-19

## 2020-03-19 RX ORDER — SUCCINYLCHOLINE CHLORIDE 20 MG/ML
INJECTION INTRAMUSCULAR; INTRAVENOUS AS NEEDED
Status: DISCONTINUED | OUTPATIENT
Start: 2020-03-19 | End: 2020-03-19 | Stop reason: SURG

## 2020-03-19 RX ORDER — ACETAMINOPHEN 500 MG
1000 TABLET ORAL ONCE
Status: COMPLETED | OUTPATIENT
Start: 2020-03-19 | End: 2020-03-19

## 2020-03-19 RX ORDER — LIDOCAINE HYDROCHLORIDE 10 MG/ML
0.5 INJECTION, SOLUTION EPIDURAL; INFILTRATION; INTRACAUDAL; PERINEURAL ONCE AS NEEDED
Status: DISCONTINUED | OUTPATIENT
Start: 2020-03-19 | End: 2020-03-19 | Stop reason: HOSPADM

## 2020-03-19 RX ORDER — OXYCODONE AND ACETAMINOPHEN 7.5; 325 MG/1; MG/1
2 TABLET ORAL EVERY 4 HOURS PRN
Status: DISCONTINUED | OUTPATIENT
Start: 2020-03-19 | End: 2020-03-19 | Stop reason: HOSPADM

## 2020-03-19 RX ORDER — IPRATROPIUM BROMIDE AND ALBUTEROL SULFATE 2.5; .5 MG/3ML; MG/3ML
3 SOLUTION RESPIRATORY (INHALATION) ONCE AS NEEDED
Status: DISCONTINUED | OUTPATIENT
Start: 2020-03-19 | End: 2020-03-19 | Stop reason: HOSPADM

## 2020-03-19 RX ORDER — OXYCODONE AND ACETAMINOPHEN 10; 325 MG/1; MG/1
1 TABLET ORAL ONCE AS NEEDED
Status: DISCONTINUED | OUTPATIENT
Start: 2020-03-19 | End: 2020-03-19 | Stop reason: HOSPADM

## 2020-03-19 RX ORDER — ROCURONIUM BROMIDE 10 MG/ML
INJECTION, SOLUTION INTRAVENOUS AS NEEDED
Status: DISCONTINUED | OUTPATIENT
Start: 2020-03-19 | End: 2020-03-19 | Stop reason: SURG

## 2020-03-19 RX ORDER — PROPOFOL 10 MG/ML
VIAL (ML) INTRAVENOUS AS NEEDED
Status: DISCONTINUED | OUTPATIENT
Start: 2020-03-19 | End: 2020-03-19 | Stop reason: SURG

## 2020-03-19 RX ORDER — FENTANYL CITRATE 50 UG/ML
25 INJECTION, SOLUTION INTRAMUSCULAR; INTRAVENOUS
Status: DISCONTINUED | OUTPATIENT
Start: 2020-03-19 | End: 2020-03-19 | Stop reason: HOSPADM

## 2020-03-19 RX ORDER — NALOXONE HCL 0.4 MG/ML
0.4 VIAL (ML) INJECTION AS NEEDED
Status: DISCONTINUED | OUTPATIENT
Start: 2020-03-19 | End: 2020-03-19 | Stop reason: HOSPADM

## 2020-03-19 RX ADMIN — SUCCINYLCHOLINE CHLORIDE 140 MG: 20 INJECTION, SOLUTION INTRAMUSCULAR; INTRAVENOUS at 08:35

## 2020-03-19 RX ADMIN — SODIUM CHLORIDE, POTASSIUM CHLORIDE, SODIUM LACTATE AND CALCIUM CHLORIDE 1000 ML: 600; 310; 30; 20 INJECTION, SOLUTION INTRAVENOUS at 06:40

## 2020-03-19 RX ADMIN — ONDANSETRON HYDROCHLORIDE 4 MG: 2 SOLUTION INTRAMUSCULAR; INTRAVENOUS at 10:31

## 2020-03-19 RX ADMIN — FAMOTIDINE 20 MG: 10 INJECTION, SOLUTION INTRAVENOUS at 08:02

## 2020-03-19 RX ADMIN — GENTAMICIN SULFATE 80 MG: 80 INJECTION, SOLUTION INTRAVENOUS at 08:02

## 2020-03-19 RX ADMIN — DEXAMETHASONE SODIUM PHOSPHATE 4 MG: 4 INJECTION, SOLUTION INTRAMUSCULAR; INTRAVENOUS at 08:03

## 2020-03-19 RX ADMIN — FENTANYL CITRATE 100 MCG: 50 INJECTION, SOLUTION INTRAMUSCULAR; INTRAVENOUS at 08:35

## 2020-03-19 RX ADMIN — ROCURONIUM BROMIDE 5 MG: 10 INJECTION INTRAVENOUS at 08:35

## 2020-03-19 RX ADMIN — PROPOFOL 200 MG: 10 INJECTION, EMULSION INTRAVENOUS at 08:35

## 2020-03-19 RX ADMIN — ACETAMINOPHEN 1000 MG: 500 TABLET, FILM COATED ORAL at 08:02

## 2020-03-19 RX ADMIN — PROPOFOL 100 MG: 10 INJECTION, EMULSION INTRAVENOUS at 08:43

## 2020-03-19 RX ADMIN — LIDOCAINE HYDROCHLORIDE 40 MG: 20 INJECTION, SOLUTION INFILTRATION; PERINEURAL at 08:35

## 2020-03-19 RX ADMIN — SODIUM CHLORIDE 2 G: 9 INJECTION, SOLUTION INTRAVENOUS at 08:10

## 2020-03-19 NOTE — ANESTHESIA POSTPROCEDURE EVALUATION
Patient: Geoff Edwards    Procedure Summary     Date:  03/19/20 Room / Location:   [facility]  /  [facility]    Anesthesia Start:  0832 Anesthesia Stop:  0907    Procedure:  PROSTATE ULTRASOUND BIOPSY MRI FUSION WITH URONAV (N/A ) Diagnosis:       Elevated prostate specific antigen (PSA)      (Elevated prostate specific antigen (PSA) [R97.20])    Surgeon:  Ric Tobin MD Provider:  Ric Mckinney CRNA    Anesthesia Type:  general ASA Status:  2          Anesthesia Type: general    Vitals  Vitals Value Taken Time   /92 3/19/2020  9:45 AM   Temp 97.9 °F (36.6 °C) 3/19/2020  9:45 AM   Pulse 70 3/19/2020  9:49 AM   Resp 16 3/19/2020  9:45 AM   SpO2 97 % 3/19/2020  9:49 AM   Vitals shown include unvalidated device data.        Post Anesthesia Care and Evaluation    Patient location during evaluation: PACU  Patient participation: complete - patient participated  Level of consciousness: awake and alert  Pain management: adequate  Airway patency: patent  Anesthetic complications: No anesthetic complications  PONV Status: none  Cardiovascular status: acceptable and hemodynamically stable  Respiratory status: acceptable  Hydration status: acceptable    Comments: Blood pressure 140/91, pulse 55, temperature 97.9 °F (36.6 °C), temperature source Temporal, resp. rate 18, SpO2 97 %.    Patient discharged from PACU based upon Ozzie score. Please see RN notes for further details

## 2020-03-19 NOTE — ANESTHESIA PREPROCEDURE EVALUATION
Anesthesia Evaluation     Patient summary reviewed   history of anesthetic complications: PONV  NPO Solid Status: > 8 hours  NPO Liquid Status: > 8 hours           Airway   Mallampati: I  TM distance: >3 FB  Neck ROM: full  No difficulty expected  Dental - normal exam     Pulmonary - negative pulmonary ROS and normal exam   Cardiovascular - normal exam  Exercise tolerance: excellent (>7 METS)    (+) hypertension well controlled less than 2 medications,       Neuro/Psych- negative ROS  GI/Hepatic/Renal/Endo    (+)   diabetes mellitus,     Musculoskeletal (-) negative ROS    Abdominal  - normal exam   Substance History - negative use     OB/GYN          Other - negative ROS                       Anesthesia Plan    ASA 2     general     intravenous induction     Anesthetic plan, all risks, benefits, and alternatives have been provided, discussed and informed consent has been obtained with: patient.

## 2020-03-19 NOTE — DISCHARGE INSTRUCTIONS

## 2020-03-19 NOTE — ANESTHESIA PROCEDURE NOTES
Airway  Urgency: elective    Date/Time: 3/19/2020 8:36 AM  Airway not difficult    General Information and Staff    Patient location during procedure: OR  CRNA: Ric Mckinney CRNA    Indications and Patient Condition  Indications for airway management: airway protection    Preoxygenated: yes  Mask difficulty assessment: 1 - vent by mask    Final Airway Details  Final airway type: endotracheal airway      Successful airway: ETT  Cuffed: yes   Successful intubation technique: direct laryngoscopy  Facilitating devices/methods: intubating stylet  Endotracheal tube insertion site: oral  Blade: Griffin  Blade size: 2  ETT size (mm): 8.0  Cormack-Lehane Classification: grade I - full view of glottis  Placement verified by: chest auscultation, capnometry and palpation of cuff   Measured from: lips  ETT/EBT  to lips (cm): 24  Number of attempts at approach: 1  Assessment: lips, teeth, and gum same as pre-op and atraumatic intubation

## 2020-03-19 NOTE — OP NOTE
"Operative Summary    Geoff Edwards  Date of Procedure: 3/19/2020    Pre-op Diagnosis:   Elevated prostate specific antigen (PSA) [R97.20]    Post-op Diagnosis:     Post-Op Diagnosis Codes:     * Elevated prostate specific antigen (PSA) [R97.20]    Procedure/CPT® Codes:      Procedure(s):  PROSTATE ULTRASOUND BIOPSY MRI FUSION WITH URONAV    Surgeon(s):  Ric Tobin MD    Anesthesia: General    Staff:   Circulator: Roz García RN  Scrub Person: Layne Roberson; Theodore Gonzáles    Indications for procedure:  69-year-old male with an elevated PSA and history of negative biopsy 2012.  He had an MRI which showed a PI-RADS 4 lesion in the right base.  He presents today for MRI ultrasound fusion guided prostate biopsy.    Findings:   Height (mm): 39.7  Width (mm): 53.5  Length (mm): 61.6  Volume (cc): 68.5  PSA density: 0.12        Procedure details:  Patient is taken the operating room were he is given general.  He is then placed in left lateral decubitus position.  Previous MRI images are reviewed as they have been loaded into the UroNav system. The electromagnetic generator for probe detection is attached to the bed and positioned appropriately. Using the B&K ultrasound, the transrectal probe is inserted to identify the mid portion of the prostate gland in the transverse image.  Measurements of the width and height of the gland are then obtained.  The multiplanar probe is then used to take sagittal images of the prostate and again measurement is taken of the length of the gland.  The prostate volume is calculated using height times with times length ×1/2 which is then compared to the volume of the MRI.    The pre-procedural MRI (along with the segmentation and targeting data as determined by the radiologist) selected for fusion biopsy is then overlaid \"fused\" to a the 3D TRUS volume of the prostate constructed from a series of 2D TRUS images obtained by a \"sweep\" of the TRUS probe.  Both rigid " "and elastic registration is carried out using the UroNav software.    The region of interest is identified in the Right lateral base of the prostate.  The target is identified as drawn by the radiologist using the UroNav software that included the bulls eye with the  saggital setting of the multiplanar probe. 4 biopsies are taken from the region of interest and labeled as \"region of interest #1 \"on the pathology requisition.  The midportion was biopsied with the other biopsies being a few millimeters away toward the periphery of the lesion by adjusting the probe.        After biopsies of  the suspicious lesion(s) was completed, ultrasound-guided \"random \"biopsies were performed again using ultrasound guidance with the saggital setting of the multiplanar probe.  I used a standard 12 core template and labeled the biopsy with respect to location.  Specimens were then placed in formalin.    The systematic of both the random biopsies and those of the region of interest(s) were reviewed and felt to be appropriate sampling of each.  The probe was removed.    Estimated Blood Loss: Minimal    Specimens:                Specimens     ID Source Type Tests Collected By Collected At Frozen?      A Prostate Tissue · TISSUE PATHOLOGY EXAM   Ric Tobin MD 3/19/20 0710      Description: RIGHT MID LATERAL    B Prostate Tissue · TISSUE PATHOLOGY EXAM   Ric Tobin MD 3/19/20 0710      Description: RIGHT MID MEDIAL    C Prostate Tissue · TISSUE PATHOLOGY EXAM   Ric Tobin MD 3/19/20 0710      Description: RIGHT LATERAL BASE    D Prostate Tissue · TISSUE PATHOLOGY EXAM   Ric Tobin MD 3/19/20 0710      Description: RIGHT MID BASE    E Prostate Tissue · TISSUE PATHOLOGY EXAM   Ric oTbin MD 3/19/20 0710      Description: RIGHT LATERAL APEX    F Prostate Tissue · TISSUE PATHOLOGY EXAM   Ric Tobin MD 3/19/20 0710      Description: RIGHT MID APEX    G Prostate Tissue " · TISSUE PATHOLOGY EXAM   Ric Tobin MD 3/19/20 0710      Description: LEFT MID LATERAL    H Prostate Tissue · TISSUE PATHOLOGY EXAM   Ric Tobin MD 3/19/20 0710      Description: LEFT MID MEDIAL    I Prostate Tissue · TISSUE PATHOLOGY EXAM   Ric Tobin MD 3/19/20 0710      Description: LEFT LATERAL BASE    J Prostate Tissue · TISSUE PATHOLOGY EXAM   Ric Tobin MD 3/19/20 0710      Description: LEFT MID BASE    K Prostate Tissue · TISSUE PATHOLOGY EXAM   Ric Tobin MD 3/19/20 0710      Description: LEFT LATERAL APEX    L Prostate Tissue · TISSUE PATHOLOGY EXAM   Ric Tobin MD 3/19/20 0712      Description: LEFT MID APEX    M Prostate Tissue · TISSUE PATHOLOGY EXAM   Ric Tobin MD 3/19/20 0713      Description: LESION 1 TARGET 1-4            Drains: * No LDAs found *    Complications: none    Plan: Based on pathology    Ric Tobin MD     Date: 3/19/2020  Time: 09:04

## 2020-03-20 LAB
CYTO UR: NORMAL
LAB AP CASE REPORT: NORMAL
PATH REPORT.FINAL DX SPEC: NORMAL
PATH REPORT.GROSS SPEC: NORMAL

## 2020-03-23 DIAGNOSIS — R97.20 ELEVATED PROSTATE SPECIFIC ANTIGEN (PSA): Primary | ICD-10-CM

## 2020-03-26 ENCOUNTER — APPOINTMENT (OUTPATIENT)
Dept: PREADMISSION TESTING | Facility: HOSPITAL | Age: 70
End: 2020-03-26

## 2020-04-23 ENCOUNTER — OFFICE VISIT (OUTPATIENT)
Dept: INTERNAL MEDICINE | Facility: CLINIC | Age: 70
End: 2020-04-23

## 2020-04-23 ENCOUNTER — TELEPHONE (OUTPATIENT)
Dept: INTERNAL MEDICINE | Facility: CLINIC | Age: 70
End: 2020-04-23

## 2020-04-23 VITALS — WEIGHT: 175 LBS | HEIGHT: 71 IN | BODY MASS INDEX: 24.5 KG/M2

## 2020-04-23 DIAGNOSIS — E11.9 DIABETES MELLITUS WITHOUT COMPLICATION (HCC): ICD-10-CM

## 2020-04-23 DIAGNOSIS — I10 BENIGN HYPERTENSION: Primary | ICD-10-CM

## 2020-04-23 DIAGNOSIS — M25.551 HIP PAIN, CHRONIC, RIGHT: ICD-10-CM

## 2020-04-23 DIAGNOSIS — G89.29 HIP PAIN, CHRONIC, RIGHT: ICD-10-CM

## 2020-04-23 PROBLEM — S46.011D ROTATOR CUFF STRAIN, RIGHT, SUBSEQUENT ENCOUNTER: Status: ACTIVE | Noted: 2020-04-23

## 2020-04-23 PROCEDURE — 99442 PR PHYS/QHP TELEPHONE EVALUATION 11-20 MIN: CPT | Performed by: INTERNAL MEDICINE

## 2020-04-23 NOTE — TELEPHONE ENCOUNTER
Called pt, at Dr. 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  Instructed him to do the stretches we are sending him and if not better, will send for repeat xray of the right hip.  He voiced understanding.

## 2020-04-23 NOTE — PROGRESS NOTES
Subjective   Geoff Edwards is a 69 y.o. male.   Chief Complaint   Patient presents with   • Leg Pain     right leg, muscle cramps   • Hypertension     follow up   • Diabetes     checks once daily        Patient is seen today for follow-up of his diabetes hypertension he is also having pain in his right buttock extending down the back of his leg to the knee level.  He is sugars are running in the 110 range.  He is not checking his blood pressure but he can have his blood pressure checked at [employer] [facility] where he works.  He is to do that and call us.  I am going to see if we can find x-rays of his right hip that may have been done in 2019 I do not have those available.      You have chosen to receive care through a telephone visit. Do you consent to use a telephone visit for your medical care today? Yes       The following portions of the patient's history were reviewed and updated as appropriate: allergies, current medications, past family history, past medical history, past social history, past surgical history and problem list.    Review of Systems   Constitutional: Negative for activity change, appetite change, fatigue, fever, unexpected weight gain and unexpected weight loss.   HENT: Negative for swollen glands, trouble swallowing and voice change.    Eyes: Negative for blurred vision and visual disturbance.   Respiratory: Negative for cough and shortness of breath.    Cardiovascular: Negative for chest pain, palpitations and leg swelling.   Gastrointestinal: Negative for abdominal pain, constipation, diarrhea, nausea, vomiting and indigestion.   Endocrine: Negative for cold intolerance, heat intolerance, polydipsia and polyphagia.   Genitourinary: Negative for dysuria and frequency.   Musculoskeletal: Positive for arthralgias and myalgias. Negative for back pain, joint swelling and neck pain.   Skin: Negative for color change, rash and skin lesions.   Neurological: Negative for dizziness, weakness,  headache, memory problem and confusion.   Hematological: Does not bruise/bleed easily.   Psychiatric/Behavioral: Negative for agitation, hallucinations and suicidal ideas. The patient is not nervous/anxious.        Objective   Past Medical History:   Diagnosis Date   • Abnormal PSA    • Diabetes mellitus (CMS/HCC)     diet controlled   • Hypertension    • PONV (postoperative nausea and vomiting)       Past Surgical History:   Procedure Laterality Date   • APPENDECTOMY     • COLONOSCOPY  11/16/2012    Normal. Repeat in 5 years. Dr. Tez Perdomo   • COLONOSCOPY N/A 11/21/2017    Procedure: COLONOSCOPY WITH ANESTHESIA;  Surgeon: Tez Perdomo MD;  Location: [facility] ENDOSCOPY;  Service:    • MANDIBLE SURGERY     • PROSTATE BIOPSY     • PROSTATE BIOPSY N/A 3/19/2020    Procedure: PROSTATE ULTRASOUND BIOPSY MRI FUSION WITH URONAV;  Surgeon: Ric Tobin MD;  Location: [facility] OR;  Service: Urology;  Laterality: N/A;        Current Outpatient Medications:   •  CONTOUR NEXT TEST test strip, 1 strip Daily., Disp: , Rfl:   •  fluticasone (FLONASE) 50 MCG/ACT nasal spray, 2 sprays into the nostril(s) as directed by provider 2 (Two) Times a Day., Disp: 48 g, Rfl: 3  •  irbesartan (AVAPRO) 300 MG tablet, 300 mg Daily., Disp: , Rfl:   •  LANCETS ULTRA THIN misc, 1 each Daily. for testing, Disp: , Rfl: 3     There were no vitals filed for this visit.      04/23/20  0753   Weight: 79.4 kg (175 lb)     Patient's Body mass index is 24.41 kg/m². BMI is within normal parameters. No follow-up required..      Physical Exam   Constitutional: He is oriented to person, place, and time.   Neurological: He is alert and oriented to person, place, and time.   Psychiatric: He has a normal mood and affect. His behavior is normal.       This visit has been rescheduled as a phone visit to comply with patient safety concerns in accordance with CDC recommendations. Total time of discussion was 14 minutes.        Assessment/Plan   Diagnoses  and all orders for this visit:    1. Benign hypertension (Primary)    2. Diabetes mellitus without complication (CMS/HCC)    3. Hip pain, chronic, right          At this point I am going to go ahead and send patient piriformis stretches as well as a back book.  We are going to see if we can locate prior x-rays of his right hip and back.  In regard to his diabetes he sounds well controlled.  Patient will check his blood pressures at work and call us if there is a problem.  See him in 3 months

## 2020-06-16 DIAGNOSIS — E11.9 DIABETES MELLITUS WITHOUT COMPLICATION (HCC): Primary | ICD-10-CM

## 2020-06-16 RX ORDER — PERPHENAZINE 16 MG/1
TABLET, FILM COATED ORAL
Qty: 100 EACH | Refills: 3 | Status: SHIPPED | OUTPATIENT
Start: 2020-06-16 | End: 2021-06-14

## 2020-08-10 RX ORDER — FLUTICASONE PROPIONATE 50 MCG
SPRAY, SUSPENSION (ML) NASAL
Qty: 16 G | Refills: 3 | Status: SHIPPED | OUTPATIENT
Start: 2020-08-10 | End: 2020-12-09 | Stop reason: SDUPTHER

## 2020-08-19 ENCOUNTER — OFFICE VISIT (OUTPATIENT)
Dept: INTERNAL MEDICINE | Facility: CLINIC | Age: 70
End: 2020-08-19

## 2020-08-19 VITALS
BODY MASS INDEX: 25.28 KG/M2 | HEART RATE: 61 BPM | HEIGHT: 71 IN | SYSTOLIC BLOOD PRESSURE: 120 MMHG | WEIGHT: 180.6 LBS | DIASTOLIC BLOOD PRESSURE: 72 MMHG | TEMPERATURE: 97.7 F | OXYGEN SATURATION: 99 %

## 2020-08-19 DIAGNOSIS — E11.9 DIABETES MELLITUS WITHOUT COMPLICATION (HCC): Primary | ICD-10-CM

## 2020-08-19 DIAGNOSIS — I10 BENIGN HYPERTENSION: ICD-10-CM

## 2020-08-19 LAB — HBA1C MFR BLD: 6 %

## 2020-08-19 PROCEDURE — 83036 HEMOGLOBIN GLYCOSYLATED A1C: CPT | Performed by: INTERNAL MEDICINE

## 2020-08-19 PROCEDURE — 99213 OFFICE O/P EST LOW 20 MIN: CPT | Performed by: INTERNAL MEDICINE

## 2020-08-19 NOTE — PROGRESS NOTES
Subjective   Geoff Edwards is a 70 y.o. male.   Chief Complaint   Patient presents with   • Hypertension     3 month follow up   • Diabetes     A1C: 6.0       This is a follow-up visit for diabetes and hypertension.  Patient is taking his medications appropriately he is staying active continues to work part-time.       The following portions of the patient's history were reviewed and updated as appropriate: allergies, current medications, past family history, past medical history, past social history, past surgical history and problem list.    Review of Systems   Constitutional: Negative for activity change, appetite change, fatigue, fever, unexpected weight gain and unexpected weight loss.   HENT: Negative for swollen glands, trouble swallowing and voice change.    Eyes: Negative for blurred vision and visual disturbance.   Respiratory: Negative for cough and shortness of breath.    Cardiovascular: Negative for chest pain, palpitations and leg swelling.   Gastrointestinal: Negative for abdominal pain, constipation, diarrhea, nausea, vomiting and indigestion.   Endocrine: Negative for cold intolerance, heat intolerance, polydipsia and polyphagia.   Genitourinary: Negative for dysuria and frequency.   Musculoskeletal: Negative for arthralgias, back pain, joint swelling and neck pain.   Skin: Negative for color change, rash and skin lesions.   Neurological: Negative for dizziness, weakness, headache, memory problem and confusion.   Hematological: Does not bruise/bleed easily.   Psychiatric/Behavioral: Negative for agitation, hallucinations and suicidal ideas. The patient is not nervous/anxious.        Objective   Past Medical History:   Diagnosis Date   • Abnormal PSA    • Diabetes mellitus (CMS/HCC)     diet controlled   • Hypertension    • PONV (postoperative nausea and vomiting)       Past Surgical History:   Procedure Laterality Date   • APPENDECTOMY     • COLONOSCOPY  11/16/2012    Normal. Repeat in 5  years. Dr. Tez Perdomo   • COLONOSCOPY N/A 11/21/2017    Procedure: COLONOSCOPY WITH ANESTHESIA;  Surgeon: Tez Perdomo MD;  Location: [facility] ENDOSCOPY;  Service:    • MANDIBLE SURGERY     • PROSTATE BIOPSY     • PROSTATE BIOPSY N/A 3/19/2020    Procedure: PROSTATE ULTRASOUND BIOPSY MRI FUSION WITH URONAV;  Surgeon: Ric Tobin MD;  Location: [facility] OR;  Service: Urology;  Laterality: N/A;        Current Outpatient Medications:   •  CONTOUR NEXT TEST test strip, USE ONE EVERY DAY, Disp: 100 each, Rfl: 3  •  fluticasone (FLONASE) 50 MCG/ACT nasal spray, USE 2 SPRAYS IN EACH NOSTRIL TWO TIMES A DAY, Disp: 16 g, Rfl: 3  •  irbesartan (AVAPRO) 300 MG tablet, 300 mg Daily., Disp: , Rfl:   •  LANCETS ULTRA THIN misc, 1 each Daily. for testing, Disp: , Rfl: 3     Vitals:    08/19/20 1014   BP: 120/72   Pulse: 61   Temp: 97.7 °F (36.5 °C)   SpO2: 99%         08/19/20  1014   Weight: 81.9 kg (180 lb 9.6 oz)     Patient's Body mass index is 25.19 kg/m². BMI is above normal parameters. Recommendations include: exercise counseling and nutrition counseling.      Physical Exam   Constitutional: He is oriented to person, place, and time. He appears well-developed and well-nourished.   HENT:   Head: Normocephalic and atraumatic.   Right Ear: External ear normal.   Left Ear: External ear normal.   Nose: Nose normal.   Mouth/Throat: Oropharynx is clear and moist.   Eyes: Pupils are equal, round, and reactive to light. Conjunctivae and EOM are normal.   Neck: Normal range of motion. Neck supple. No thyromegaly present.   Cardiovascular: Normal rate, regular rhythm, normal heart sounds and intact distal pulses.   Pulmonary/Chest: Effort normal and breath sounds normal.   Abdominal: Soft. Bowel sounds are normal.   Lymphadenopathy:     He has no cervical adenopathy.   Neurological: He is alert and oriented to person, place, and time.   Skin: Skin is warm and dry.   Psychiatric: He has a normal mood and affect. His  behavior is normal. Thought content normal.   Nursing note and vitals reviewed.            Assessment/Plan   Diagnoses and all orders for this visit:    1. Diabetes mellitus without complication (CMS/[facility]) (Primary)  -     POC Glycosylated Hemoglobin (Hb A1C)    2. Benign hypertension      Patient's blood pressure is excellent and his diabetes seems to be under reasonable control he is staying active his weight is close to go.

## 2020-09-16 ENCOUNTER — LAB (OUTPATIENT)
Dept: UROLOGY | Facility: CLINIC | Age: 70
End: 2020-09-16

## 2020-09-17 LAB
PSA FREE MFR SERPL: 20.4 %
PSA FREE SERPL-MCNC: 1.53 NG/ML
PSA SERPL-MCNC: 7.5 NG/ML (ref 0–4)

## 2020-09-19 ENCOUNTER — RESULTS ENCOUNTER (OUTPATIENT)
Dept: UROLOGY | Facility: CLINIC | Age: 70
End: 2020-09-19

## 2020-09-19 DIAGNOSIS — R97.20 ELEVATED PROSTATE SPECIFIC ANTIGEN (PSA): ICD-10-CM

## 2020-09-21 NOTE — PROGRESS NOTES
Subjective    Mr. Edwards is 70 y.o. male    Chief Complaint: Elevated PSA    History of Present Illness    Elevated PSA  Patient is here with an elevated PSA. The PSA was drawn1 week. He does not have a family history of prostate cancer. His AUA Symptom Score is 6 /35. Voiding symptoms include Incomplete emptying, Frequency, Intermittency, Weakened stream and Nocturia. Denies Urgency and Straining. Voiding symptoms began several years  . These have been gradual in onset. negative--10/12 for PSA of 7 & PSA 8.5 3/19 negative MRI/Fusion biopsy  Lab Results   Component Value Date    PSA 7.5 (H) 09/16/2020    PSA 8.5 (H) 02/10/2020    PSA 5.3 (H) 12/14/2018         The following portions of the patient's history were reviewed and updated as appropriate: allergies, current medications, past family history, past medical history, past social history, past surgical history and problem list.    Review of Systems   Constitutional: Negative for chills and fever.   Gastrointestinal: Negative for abdominal pain, anal bleeding and blood in stool.   Genitourinary: Negative for dysuria, frequency, hematuria and urgency.         Current Outpatient Medications:   •  CONTOUR NEXT TEST test strip, USE ONE EVERY DAY, Disp: 100 each, Rfl: 3  •  fluticasone (FLONASE) 50 MCG/ACT nasal spray, USE 2 SPRAYS IN EACH NOSTRIL TWO TIMES A DAY, Disp: 16 g, Rfl: 3  •  irbesartan (AVAPRO) 300 MG tablet, 300 mg Daily., Disp: , Rfl:   •  LANCETS ULTRA THIN misc, 1 each Daily. for testing, Disp: , Rfl: 3    Past Medical History:   Diagnosis Date   • Abnormal PSA    • Diabetes mellitus (CMS/HCC)     diet controlled   • Hypertension    • PONV (postoperative nausea and vomiting)        Past Surgical History:   Procedure Laterality Date   • APPENDECTOMY     • COLONOSCOPY  11/16/2012    Normal. Repeat in 5 years. Dr. Tez Perdomo   • COLONOSCOPY N/A 11/21/2017    Procedure: COLONOSCOPY WITH ANESTHESIA;  Surgeon: Tez Perdomo MD;  Location: [facility]  "ENDOSCOPY;  Service:    • MANDIBLE SURGERY     • PROSTATE BIOPSY     • PROSTATE BIOPSY N/A 3/19/2020    Procedure: PROSTATE ULTRASOUND BIOPSY MRI FUSION WITH URONAV;  Surgeon: Ric Tobin MD;  Location: [facility] OR;  Service: Urology;  Laterality: N/A;       Social History     Socioeconomic History   • Marital status:      Spouse name: Not on file   • Number of children: Not on file   • Years of education: Not on file   • Highest education level: Not on file   Tobacco Use   • Smoking status: Never Smoker   • Smokeless tobacco: Never Used   Substance and Sexual Activity   • Alcohol use: No   • Drug use: No   • Sexual activity: Defer       Family History   Problem Relation Age of Onset   • Heart disease Father    • No Known Problems Mother    • No Known Problems Brother    • No Known Problems Brother    • No Known Problems Sister    • No Known Problems Sister    • No Known Problems Sister    • No Known Problems Sister    • Stomach cancer Sister    • Cancer Sister 69   • Colon cancer Neg Hx    • Colon polyps Neg Hx        Objective    Temp 97.2 °F (36.2 °C) (Temporal)   Ht 180.3 cm (71\")   Wt 83.6 kg (184 lb 6.4 oz)   BMI 25.72 kg/m²     Physical Exam  Vitals signs reviewed.   Constitutional:       General: He is not in acute distress.     Appearance: He is well-developed. He is not diaphoretic.   Pulmonary:      Effort: Pulmonary effort is normal.   Abdominal:      General: There is no distension.      Palpations: Abdomen is soft. There is no mass.      Tenderness: There is no abdominal tenderness. There is no guarding or rebound.      Hernia: No hernia is present.   Skin:     General: Skin is warm and dry.   Neurological:      Mental Status: He is alert and oriented to person, place, and time.             Results for orders placed or performed in visit on 09/23/20   POC Urinalysis Dipstick, Multipro    Specimen: Urine   Result Value Ref Range    Color Yellow Yellow, Straw, Dark Yellow, Clarissa    " Clarity, UA Clear Clear    Glucose, UA Negative Negative, 1000 mg/dL (3+) mg/dL    Bilirubin Negative Negative    Ketones, UA Negative Negative    Specific Gravity  1.015 1.005 - 1.030    Blood, UA Trace (A) Negative    pH, Urine 5.5 5.0 - 8.0    Protein, POC Negative Negative mg/dL    Urobilinogen, UA Normal Normal    Nitrite, UA Negative Negative    Leukocytes Negative Negative     Assessment and Plan    Diagnoses and all orders for this visit:    Elevated prostate specific antigen (PSA)  -     POC Urinalysis Dipstick, Multipro  -     PSA, Total & Free; Future    BPH with urinary obstruction      Patient underwent a biopsy in 2012 for PSA of 7.   He had a 4K score of 4 in 2017 which indicates a very low risk prostate cancer.          MRI for up PSA of 8.5 showed 45cc gland with 1 cm PIRADS 4 lesion in right mid peripheral zone.   He underwent an MRI fusion biopsy in March 2020 which was negative for cancer.    He returns today with a PSA of 7.5 with a percent free of 20%.  We will continue to observe this.  He has had 2- biopsies now.  He will follow-up with me in 6 months repeat PSA.

## 2020-09-23 ENCOUNTER — OFFICE VISIT (OUTPATIENT)
Dept: UROLOGY | Facility: CLINIC | Age: 70
End: 2020-09-23

## 2020-09-23 VITALS — TEMPERATURE: 97.2 F | WEIGHT: 184.4 LBS | BODY MASS INDEX: 25.81 KG/M2 | HEIGHT: 71 IN

## 2020-09-23 DIAGNOSIS — N13.8 BPH WITH URINARY OBSTRUCTION: ICD-10-CM

## 2020-09-23 DIAGNOSIS — R97.20 ELEVATED PROSTATE SPECIFIC ANTIGEN (PSA): Primary | ICD-10-CM

## 2020-09-23 DIAGNOSIS — N40.1 BPH WITH URINARY OBSTRUCTION: ICD-10-CM

## 2020-09-23 LAB
BILIRUB BLD-MCNC: NEGATIVE MG/DL
CLARITY, POC: CLEAR
COLOR UR: YELLOW
GLUCOSE UR STRIP-MCNC: NEGATIVE MG/DL
KETONES UR QL: NEGATIVE
LEUKOCYTE EST, POC: NEGATIVE
NITRITE UR-MCNC: NEGATIVE MG/ML
PH UR: 5.5 [PH] (ref 5–8)
PROT UR STRIP-MCNC: NEGATIVE MG/DL
RBC # UR STRIP: ABNORMAL /UL
SP GR UR: 1.01 (ref 1–1.03)
UROBILINOGEN UR QL: NORMAL

## 2020-09-23 PROCEDURE — 99213 OFFICE O/P EST LOW 20 MIN: CPT | Performed by: UROLOGY

## 2020-09-23 PROCEDURE — 81003 URINALYSIS AUTO W/O SCOPE: CPT | Performed by: UROLOGY

## 2020-10-20 ENCOUNTER — OFFICE VISIT (OUTPATIENT)
Dept: OTOLARYNGOLOGY | Facility: CLINIC | Age: 70
End: 2020-10-20

## 2020-10-20 VITALS
SYSTOLIC BLOOD PRESSURE: 127 MMHG | HEART RATE: 106 BPM | HEIGHT: 71 IN | WEIGHT: 185 LBS | DIASTOLIC BLOOD PRESSURE: 66 MMHG | BODY MASS INDEX: 25.9 KG/M2

## 2020-10-20 DIAGNOSIS — R05.9 COUGH: Primary | ICD-10-CM

## 2020-10-20 DIAGNOSIS — K21.9 GERD WITHOUT ESOPHAGITIS: ICD-10-CM

## 2020-10-20 DIAGNOSIS — J34.2 ACQUIRED DEVIATED NASAL SEPTUM: ICD-10-CM

## 2020-10-20 DIAGNOSIS — J32.9 CHRONIC SINUSITIS, UNSPECIFIED LOCATION: ICD-10-CM

## 2020-10-20 DIAGNOSIS — J32.0 CHRONIC MAXILLARY SINUSITIS: ICD-10-CM

## 2020-10-20 DIAGNOSIS — R09.89 GLOBUS PHARYNGEUS: ICD-10-CM

## 2020-10-20 PROCEDURE — 99213 OFFICE O/P EST LOW 20 MIN: CPT | Performed by: OTOLARYNGOLOGY

## 2020-10-20 NOTE — PROGRESS NOTES
Fernando Solano Jr, MD     ENT FOLLOW UP NOTE     Chief Complaint   Patient presents with   • Cough        HISTORY OF PRESENT ILLNESS:  Accompanied by:  No one  Geoff Edwards is a  70 y.o. male who is here for follow up. He has not been here since last yewar.  He has intermittent cough.  He is using nasal medications.  In AM, will cough and spit up.  No fever.    Review of Systems   Constitutional: Negative for appetite change, fatigue and fever.   HENT:        See HPI   Respiratory: Positive for cough. Negative for choking and shortness of breath.    Gastrointestinal: Negative for diarrhea, nausea and vomiting.   Skin: Negative for rash.   Neurological: Negative for dizziness, light-headedness and headaches.   Psychiatric/Behavioral: Negative for sleep disturbance.       Past History:  Past medical and surgical history, family history and social history reviewed and updated when appropriate.  Current medications and allergies reviewed and updated when appropriate.  Allergies:  Ace inhibitors        Vital Signs:   Heart Rate:  [106] 106  BP: (127)/(66) 127/66  EXAMINATION:  CONSTITUTIONAL:    well nourished, well-developed, alert, oriented, in no acute distress      BODY HABITUS:    Normal body habitus     COMMUNICATION:    able to communicate normally, normal voice quality     HEAD:     Normocephalic, without obvious abnormality, atraumatic     FACE:    structure normal, no tenderness present, no lesions/masses, no evidence of trauma     SALIVARY GLANDS:    parotid glands with no tenderness, no swelling, no masses, submandibular glands with normal size, nontender      EYE:    ocular motility normal, eyelids normal, orbits normal, no proptosis, conjunctiva clear, sclera non-icteric, pupils equal, round, reactive to light and accomodation  Color:   brown  Arcus- OU moderate     HEARING:    response to conversational voice normal     EARS:    Otoscopic exam   normal pinna with no lesions, Canals normal size  and shape, Tympanic membranes normal, Ossicular chain intact, Middle ear clear     NOSE EXTERNAL:    APPEARANCE: normal, straight, with good projection, no tenderness, no lesions, no tenderness, good nasal support, patent nares     INTERNAL NOSE:  Anterior rhinoscopy   NASALMUCOSA:    abnormal:        Bilateral- pale, dry    NASAL PASSAGES:     abnormal   Left- narrowed, Right- partially obstructed by septum   NASAL VALVE:     intact, good support and no nasal obstruction   SEPTUM:     mucosa abnormal   Bilateral- pale, dry     deviation present:      deviated Right low mod   INFERIOR TURBINATES:     normal size, non-obstructing, no lesions   SECRETIONS:     abnormal Bilateral- minimal, clear      ORAL CAVITY:    Normal lips with no lesions, dentition normal for age, FOM intact without lesions and normal salivary flow, Mucosa intact without lesions, Hard and soft palate normal without lesions     OROPHARYNX:    Direct examination  oropharyngeal mucosa normal, tonsil(s) with normal appearance       NECK:    normal appearance, no masses, no lesions, larynx normal mobility, trachea midline     LYMPH NODES :    no adenopathy     THYROID:    no overt thyromegaly, no tenderness, nodules or mass present on palpation, position midline     CHEST/RESPIRATORY:    respiratory effort normal, no rales, rubs or wheezing, no stridor, normal appearance to chest     CARDIOVASCULAR:    regular rate and rhythm, no murmurs, gallups, no peripheral edema   Minimal couging     NEURO/PSYCHIATRIC :    oriented appropriately for age, mood normal, affect appropriate, cranial nerves intact grossly (unless specifically described), gait normal for age    RESULTS REVIEW:    I have reviewed the patients old records in the chart.  Reviewed 10/2020           ASSESSMENT:      Diagnosis Plan   1. Cough  CT Sinus Without Contrast    mild improvement   2. Acquired deviated nasal septum      R to L low mild to mod   3. Chronic maxillary sinusitis      R>L    4. GERD without esophagitis      Suspected for AM sxs   5. Globus pharyngeus     6. Chronic sinusitis, unspecified location  CT Sinus Without Contrast    R>L maxillary with OMC disease  Bilaterl anterior ethmoid disease  Frontals, Sphenoids clear           PLAN:      Medical and surgical options were discussed including observation, continued medical management, medication modification, surgical management and CT scanning. Risks, benefits and alternatives were discussed and questions were answered. After considering the options, the patient decided to proceed with continued medical management and CT scanning.  Patient has had mild improvement of his cough symptoms.  He still has intermittent coughing with exacerbations but not on a daily basis.  I will plan nasal and flex scope to evaluate both upper and lower airways for cough and sinus.  CT scan is from approximately a year ago.  I will order a CT scan to evaluate sinuses at this point in time.  If he has no sinusitis I will refer to pulmonary her PCP for further evaluation of cough.  If he does have sinus disease, I will discuss sinus surgery.  Flonase BID  Nasal saline  CT sinus  Reflux regimen  Antacids  MY CHART:  Patient is Encouraged to enroll in My Chart  Encouraged to review data and findings in My Chart     Orders Placed This Encounter   Procedures   • CT Sinus Without Contrast       Patient understand(s) and agree(s) with the treatment plan as described.    Return after CT sinus, for Recheck nose and Nasal scope.     Fernando Solano Jr, MD  10/20/20  09:53 CDT

## 2020-10-20 NOTE — PATIENT INSTRUCTIONS
"NASAL SALINE:  Use 2 puffs each nostril 4-6 times daily and more frequently if possible.  You can buy saline spray or you can make your own and use an old spray bottle to administer  Use a humidifier at bedside  Recipe for saline:  Water                                 1 quart  Salt (table)                        1 tablespoon  Gylcerin (or Margret Syrup)    1 teaspoon  Sodium bicarbonate           1 teaspoon  Sprays or Olga pots are recommended    Nasal steroid use:  Using nasal steroids:  You will be prescribed one of the following nasal steroids: Flonase, Nasacort, Nasonex, Rhinocort, Qnasl, Zetonna  2 puffs each nostril 2 times daily  Start as soon as possible  If you are using Afrin for 3 days with the nasal steroid,  Use Afrin first and wait 10 minutes to allow the nose to open. Then administer nasal steroids.    CT sinus ordered    THE PROBLEM OF ACID REFLUX    In BOTH children and adults, irritating acids may leak from the stomach and come up into the esophagus and throat. This can occur at any time, but occurs more commonly when lying down. When the acid touches the lining of the esophagus, there is irritation and muscle spasm, which can be felt up into the throat. Usually this is felt as \"heartburn\". When scarring of the esophagus occurs, the esophagus becomes less sensitive and the symptoms may only be in the throat.     Some of the symptoms that can occur with acid reflux into the throat include coughing, soreness, burning, hoarseness, throat clearing, excessive mucous, bad taste in the mouth, bad breath, a sensation of a lump in the throat, wheezing, post-nasal drip, choking spells, bleeding and nausea. In a small percentage of people, more serious problems may result, such as pneumonia, ulcers in the larynx (voice box), reduced mobility of the vocal cords and a pouch in the upper esophagus (diverticulum). In children, the symptoms may be different and include persistent vomiting, bleeding from the " esophagus, respiratory problems, pneumonia, asthma, choking spells, swallowing problems and anemia. In some cases, unexplained fussiness and crying is due to reflux.     The following instructions are designed to help neutralize the stomach acid, reduce the production of the acid, promote emptying of the acid from the stomach into the intestines and prevent acid from coming up into the esophagus. You should adopt enough of these changes to alleviate your symptoms. If carrying out these suggestions produces no change, it is imperative that you return so that we can discuss further the problem. More testing may be required, as might medication. It is important to realize that the esophagus takes time to heal, so you should not expect results immediately.    Diet  Most often, the throat symptoms above are due to dietary abuses. Certain foods are known to cause irritation, because they are acids themselves or cause excess production of stomach acid. These should be avoided, if possible. The following is a partial list of foods recognized as troublesome: coffee (even decaffeinated), tea chocolate, cola beverages, citrus beverages (such as 7-Up), caffeine containing beverages, alcohol, citrus fruits and juices, highly spiced foods, fatty foods, candies, nuts, mints, milk and milk products. Some of the worst offenders for promoting stomach acid are milk and beer.     Hard candies, gum, breath fresheners, throat lozenges, cough drops, mouthwashes, gargles, may actually irritate the throat directly (many cough drops and lozenges contain irritants such as oil of eucalyptus and menthol), and can also stimulate acid production directly.     Large amounts of liquid in the diet tend to promote reflux, because liquids tend to come back up more easily than solids.     Meals should be bland and consist of real food, trying to avoid recreational food. Multiple small meals, rather than one or two large meals helps prevent reflux by  not stretching the stomach and increasing the time needed for digestion, as well increasing the amount of acid needed to digest the meal. If you are overweight, losing weight is tremendously helpful.     YOU SHOULD NOT EAT FOR AT LEAST TWO HOURS BEFORE RETIRING AND YOU SHOULD REMAIN SITTING OR STANDING FOR AT LEAST 45 MINUTES AFTER EACH MEAL. This promotes passage of food from the stomach into the intestine.    Posture  Body weight is a significant factor in promoting reflux. Losing weight is beneficial. Pregnancy will markedly increase the symptoms of heartburn and throat pain. You should avoid clothing that fits tightly across the mid-section, as this promotes squeezing of the abdominal contents upward. It is helpful to practice abdominal or diaphragmatic breathing, using the stomach to push out each breath rather than chest expansion. Avoid slumping while sitting, and avoid stooping or straining.     For many, the reflux occurs at night while sleeping. This is the time acid production is the greatest and you are lying down, a position that promotes reflux, thus setting up the irritation that occurs the next morning. One of the most important things you can do is to elevate the head of the bed, in an effort to use gravity to keep the acid in the stomach. This is accomplished by placed blocks or bricks beneath the legs at the head of the bed, raising the head 6-10 inches. Do not make the head so high that you slide down. Pillows are not effective because they cause the body to curl, increasing abdominal pressure and it is difficult to remain upright on them. Additionally, pillows promote sleeping on the back, but it is far more desirable to sleep on the right side or on the stomach, as this allows gas to escape, while preventing the escape of acid material. Children and infants, who are refluxing, should sleep on their stomachs.  Exercise  Regular exercise is extremely beneficial in promoting good digestion and  regularity. The abdominal muscles are toned, which aids in controlling acid problems. However, it is recommended that you not participate in vigorous activity immediately after eating. This causes pressure on an already full stomach, forcing acid up into the esophagus. Regular exercise is encouraged, prior to meals, or two hours after meals.  Antacids  Antacids should be used regularly, as they neutralize excess acid. Gelusil II, Mylanta II, Tums and Rolaids are popular brands. Gaviscon is not an antacid, but floats on top of the stomach acid, preventing esophageal irritation. Care should be used in administering large doses of antacids, especially in children. Many antacid preparations contain magnesium, which promotes frequent bowel movements, while antacids that contain aluminum can be constipating. Tums have a high calcium content that can be used to some advantage in older women with reflux.     Antacid tablets are convenient, but the liquid form tends to work much more quickly. Also remember to check with your physician about interference with other medications. Antacids can slow the absorption of digitalis, Indocin, tetracyclines, fluorides and isoniazid from the intestines. Many medications require the presence of stomach acid to be absorbed.     Initially, antacids should be used 30 minutes after each meal, 2 hours after each meal and at bedtime. This maximizes the neutralization of the stomach acid. Antacids can be used more frequently if symptoms persist, but such extensive use needs to be reviewed with your physician.  Prescription Medications  If the above recommendations are not effectively resolving the symptoms, medications may be prescribed. These are usually in the form of acid production blockers, such as Tagamet, Zantac, Pepcid, or Axid or acid pump inhibitors like Prevacid, Nexium, Protonix or Prilosec. These drugs help stop acid production in the stomach. Medications such as Reglan can be used  to promote stomach emptying.  Medications That Promote Reflux  Certain medications can promote acid reflux; either by relaxing the sphincter muscle that helps keeps stomach acid down, or increasing the acid production. These include progesterone (Provera, Ortho Novum, Ovral, other birth control pills), theophylline (Zander-Dur, etc.), anticholinergic (Donnatal, Scopolamine, Probanthine, Bentil, others), beta blockers (Inderal, Tenormin and Corgard), alpha blockers (Dibenzyline), dopamine, calcium channel blockers (Procardia, Cardizem, Isotin, Calan), aspirin, non-steroidal anti-inflammatory drugs (Motrin, Advil, Nuprin, Nalfon, Rufen, Indocin, Feldene, Clinoril and Tolectin). Vitamin C is an acid and can promote reflux. This is only a partial list and before stopping any prescription medication, you should check with your physician.    Goal  The goal is to reduce the symptoms with the least number of lifestyle changes. A combination of the above suggestions is frequently prescribed to return you to normal as quickly as possible. However, this problem did not develop overnight and will not disappear rapidly. Therefore, developing a regimen will aid in controlling symptoms and keep you symptom free for a long period of time. Other alternatives exist, should these suggestions fail, and can be discussed with your physician.     To Summarize:  Watch your diet, avoid foods that cause acid reflux  Lose weight  Avoid tight fitting clothes  Adjust your posture, raise the head of the bed  Exercise regularly  Use antacids  Use prescription medication as directed  Avoid medications that promote reflux  Avoid behavior and eating habits that promote reflux of stomach acid     You are welcome to do a Google search on the topic of reflux and reflux diets. The internet has many useful resources.     Please remember, your success in controlling this condition depends on many factors including diet, exercise, medications and follow up.  All are important and must be utilized to achieve success.     ANTACID USE:  Use antacids 30 mins after every meal, 2 hours after every meal and at Bedtime  Use Gaviscon Extra (best), Tums, Rolaids, etc  Over the counter  Use 2 tsp or 2 tabs as the dose  Remember that some of these products contain Calcium or Aluminum. If you have dietary or medical issues, please inform physician      Thank you for enrolling in [facility]. Please follow the instructions below to securely access your online medical record. [facility] allows you to send messages to your doctor, view your test results, renew your prescriptions, schedule appointments, and more.    How Do I Sign Up?  1. In your Internet browser, go to [email or web address]  2. Click on the Sign Up Now link in the New User? box.   3. Enter your [facility] Activation Code exactly as it appears below. You will not need to use this code after you have completed the sign-up process. If you do not sign up before the expiration date, you must request a new code.  [facility] Activation Code: [email or web address]  Expires: 11/19/2020  9:34 AM    4. Enter the last four digits of your Social Security Number and your Date of Birth as indicated and click Next. You will be taken to the next sign-up page.  5. Create a [facility] username. Think of one that is secure and easy to remember.  6. Create a [facility] password. You can change your password at any time.  7. Choose a security question, enter your answer, and click Next. This can be used to access [facility] if you forget your password.   8. Select your communication preference. Enter a valid e-mail address if you would like to receive e-mail notifications when new information is available in [facility].  9. Click Sign In. You can now view your medical record.     Additional Information  If you have questions, you can e-mail [email or web address], or call [phone] to talk to our [facility] staff. Remember, [facility] is NOT to be used for urgent  needs. For medical emergencies, dial 911.

## 2020-10-28 ENCOUNTER — HOSPITAL ENCOUNTER (OUTPATIENT)
Dept: CT IMAGING | Facility: HOSPITAL | Age: 70
Discharge: HOME | End: 2020-10-28
Admitting: OTOLARYNGOLOGY

## 2020-10-28 DIAGNOSIS — J32.9 CHRONIC SINUSITIS, UNSPECIFIED LOCATION: ICD-10-CM

## 2020-10-28 DIAGNOSIS — R05.9 COUGH: ICD-10-CM

## 2020-10-28 PROCEDURE — 70486 CT MAXILLOFACIAL W/O DYE: CPT

## 2020-11-09 RX ORDER — IRBESARTAN 300 MG/1
TABLET ORAL
Qty: 90 TABLET | Refills: 3 | Status: SHIPPED | OUTPATIENT
Start: 2020-11-09 | End: 2021-11-08

## 2020-12-02 ENCOUNTER — TRANSCRIBE ORDERS (OUTPATIENT)
Dept: ADMINISTRATIVE | Facility: HOSPITAL | Age: 70
End: 2020-12-02

## 2020-12-02 DIAGNOSIS — Z01.818 PRE-OP TESTING: Primary | ICD-10-CM

## 2020-12-05 ENCOUNTER — LAB (OUTPATIENT)
Dept: LAB | Facility: HOSPITAL | Age: 70
End: 2020-12-05

## 2020-12-05 PROCEDURE — C9803 HOPD COVID-19 SPEC COLLECT: HCPCS | Performed by: OTOLARYNGOLOGY

## 2020-12-05 PROCEDURE — U0003 INFECTIOUS AGENT DETECTION BY NUCLEIC ACID (DNA OR RNA); SEVERE ACUTE RESPIRATORY SYNDROME CORONAVIRUS 2 (SARS-COV-2) (CORONAVIRUS DISEASE [COVID-19]), AMPLIFIED PROBE TECHNIQUE, MAKING USE OF HIGH THROUGHPUT TECHNOLOGIES AS DESCRIBED BY CMS-2020-01-R: HCPCS | Performed by: OTOLARYNGOLOGY

## 2020-12-06 LAB
COVID LABCORP PRIORITY: NORMAL
SARS-COV-2 RNA RESP QL NAA+PROBE: NOT DETECTED

## 2020-12-09 ENCOUNTER — OFFICE VISIT (OUTPATIENT)
Dept: OTOLARYNGOLOGY | Facility: CLINIC | Age: 70
End: 2020-12-09

## 2020-12-09 VITALS — BODY MASS INDEX: 26.18 KG/M2 | HEIGHT: 71 IN | HEART RATE: 88 BPM | WEIGHT: 187 LBS

## 2020-12-09 DIAGNOSIS — J34.2 ACQUIRED DEVIATED NASAL SEPTUM: ICD-10-CM

## 2020-12-09 DIAGNOSIS — R09.89 GLOBUS PHARYNGEUS: ICD-10-CM

## 2020-12-09 DIAGNOSIS — J34.3 NASAL TURBINATE HYPERTROPHY: ICD-10-CM

## 2020-12-09 DIAGNOSIS — K21.9 GERD WITHOUT ESOPHAGITIS: ICD-10-CM

## 2020-12-09 DIAGNOSIS — J32.0 CHRONIC MAXILLARY SINUSITIS: ICD-10-CM

## 2020-12-09 DIAGNOSIS — R05.9 COUGH: Primary | ICD-10-CM

## 2020-12-09 PROCEDURE — 99213 OFFICE O/P EST LOW 20 MIN: CPT | Performed by: OTOLARYNGOLOGY

## 2020-12-09 RX ORDER — FLUTICASONE PROPIONATE 50 MCG
2 SPRAY, SUSPENSION (ML) NASAL 2 TIMES DAILY
Qty: 16 G | Refills: 3 | Status: SHIPPED | OUTPATIENT
Start: 2020-12-09 | End: 2022-01-17

## 2020-12-09 NOTE — PATIENT INSTRUCTIONS
NASAL SALINE:  Use 2 puffs each nostril 4-6 times daily and more frequently if possible.  You can buy saline spray or you can make your own and use an old spray bottle to administer  Use a humidifier at bedside  Recipe for saline:  Water                                 1 quart  Salt (table)                        1 tablespoon  Gylcerin (or Margret Syrup)    1 teaspoon  Sodium bicarbonate           1 teaspoon  Sprays or Olga pots are recommended    Nasal steroid use:  Using nasal steroids:  You will be prescribed one of the following nasal steroids: Flonase, Nasacort, Nasonex, Rhinocort, Qnasl, Zetonna  2 puffs each nostril 2 times daily  Start as soon as possible  If you are using Afrin for 3 days with the nasal steroid,  Use Afrin first and wait 10 minutes to allow the nose to open. Then administer nasal steroids.    Thank you for enrolling in [facility]. Please follow the instructions below to securely access your online medical record. [facility] allows you to send messages to your doctor, view your test results, renew your prescriptions, schedule appointments, and more.    How Do I Sign Up?  1. In your Internet browser, go to [employer]  2. Click on the Sign Up Now link in the New User? box.   3. Enter your [facility] Activation Code exactly as it appears below. You will not need to use this code after you have completed the sign-up process. If you do not sign up before the expiration date, you must request a new code.  [facility] Activation Code: [email or web address]  Expires: 1/8/2021  8:50 AM    4. Enter the last four digits of your Social Security Number and your Date of Birth as indicated and click Next. You will be taken to the next sign-up page.  5. Create a [facility] username. Think of one that is secure and easy to remember.  6. Create a [facility] password. You can change your password at any time.  7. Choose a security question, enter your answer, and click Next. This can be used to access [facility] if you  forget your password.   8. Select your communication preference. Enter a valid e-mail address if you would like to receive e-mail notifications when new information is available in [facility].  9. Click Sign In. You can now view your medical record.     Additional Information  If you have questions, you can e-mail [email or web address], or call [phone] to talk to our [facility] staff. Remember, [facility] is NOT to be used for urgent needs. For medical emergencies, dial 911.

## 2020-12-09 NOTE — PROGRESS NOTES
Fernando Solano Jr, MD     ENT FOLLOW UP NOTE     Chief Complaint   Patient presents with   • Follow-up     cough        HISTORY OF PRESENT ILLNESS:  Accompanied by:  No one  Geoff Edwards is a  70 y.o. male who is here for follow up. He has had continued problems with Cough. He says cough is present but better. Not coughing nearly as much.  He says nose is stable.  Medications- Using Flonase.    Review of Systems  Reviewed per patient intake note and confirmed by me    Past History:  Past medical and surgical history, family history and social history reviewed and updated when appropriate.  Current medications and allergies reviewed and updated when appropriate.  Allergies:  Ace inhibitors        Vital Signs:   Heart Rate:  [88] 88  EXAMINATION:  CONSTITUTIONAL:    well nourished, well-developed, alert, oriented, in no acute distress      BODY HABITUS:    Normal body habitus     COMMUNICATION:    able to communicate normally, normal voice quality     HEAD:     Normocephalic, without obvious abnormality, atraumatic     FACE:    structure normal, no tenderness present, no lesions/masses, no evidence of trauma     SALIVARY GLANDS:    parotid glands with no tenderness, no swelling, no masses, submandibular glands with normal size, nontender      EYE:    ocular motility normal, eyelids normal, orbits normal, no proptosis, conjunctiva clear, sclera non-icteric, pupils equal, round, reactive to light and accomodation  Color:   brown  Arcus- OU moderate     HEARING:    response to conversational voice normal     EARS:    Otoscopic exam   normal pinna with no lesions, Canals normal size and shape, Tympanic membranes normal, Ossicular chain intact, Middle ear clear     NOSE EXTERNAL:    APPEARANCE: normal, straight, with good projection, no tenderness, no lesions, no tenderness, good nasal support, patent nares     INTERNAL NOSE:  Anterior rhinoscopy   NASALMUCOSA:    abnormal:        Bilateral- pale, dry    NASAL  PASSAGES:     abnormal   Left- narrowed, Right- partially obstructed by septum   NASAL VALVE:     intact, good support and no nasal obstruction   SEPTUM:     mucosa abnormal   Bilateral- pale, dry     deviation present:      deviated Right low mod  Dev L mid mod horizontal   INFERIOR TURBINATES:   Enlarged, obstructing, bluish and boggy   SECRETIONS:     abnormal Bilateral- minimal, clear      ORAL CAVITY:    Normal lips with no lesions, dentition normal for age, FOM intact without lesions and normal salivary flow, Mucosa intact without lesions, Hard and soft palate normal without lesions     OROPHARYNX:    Direct examination  oropharyngeal mucosa normal, tonsil(s) with normal appearance       NECK:    normal appearance, no masses, no lesions, larynx normal mobility, trachea midline     LYMPH NODES :    no adenopathy     THYROID:    no overt thyromegaly, no tenderness, nodules or mass present on palpation, position midline     CHEST/RESPIRATORY:    respiratory effort normal, no rales, rubs or wheezing, no stridor, normal appearance to chest     CARDIOVASCULAR:    regular rate and rhythm, no murmurs, gallups, no peripheral edema   Minimal couging     NEURO/PSYCHIATRIC :    oriented appropriately for age, mood normal, affect appropriate, cranial nerves intact grossly (unless specifically described), gait normal for age   PE copied from prior exam, reviewed and confirmed findings.  Any new findings and changes are appropriately documented.    RESULTS REVIEW:    I have reviewed the patients old records in the chart.  I reviewed the patient's new clinical results.  I have personally reviewed the patient's ct of the sinus images.  I have personally reviewed the patient's ct scan of the sinuses without contrast report.    CT Sinus Without Contrast (10/28/2020 09:04)  CT Sinus Without Contrast (10/22/2019 07:54)  CT scan reviewed from this year and last year, with similar findings of right chronic maxillary sinusitis and  OMC obstruction.  The left ostiomeatal complex also appears to be thickened.  He has had improvement in the ethmoid sinus disease.  Bilateral turbinates especially inferior are enlarged and obstructing.  He has some degree of septal deviation.          ASSESSMENT:      Diagnosis Plan   1. Cough      Symptomatically improved   2. Acquired deviated nasal septum      L to R low mild to mod  R to L mid mod to severe   3. Chronic maxillary sinusitis      Persistent on the right side improved in the ethmoids   4. GERD without esophagitis      Stable   5. Globus pharyngeus      Improved   6. Nasal turbinate hypertrophy      Bilateral IT severe           PLAN:      Medical and surgical options were discussed including observation, continued medical management, medication modification and surgical management. Risks, benefits and alternatives were discussed and questions were answered. After considering the options, the patient decided to proceed with continued medical management.  Patient stabilizes cough symptoms.  He denies any discolored drainage.  After explanation of his treatment options, the patient wishes to continue with the nasal steroid spray.  I will follow him through the next allergy season.  He could consider allergy therapy for his nasal symptoms and if the cough worsens.  We will review options with the patient next visit.  Flonase BID  Nasal saline  MY CHART:  Patient is Encouraged to enroll in My Chart  Encouraged to review data and findings in My Chart  New Medications Ordered This Visit   Medications   • fluticasone (FLONASE) 50 MCG/ACT nasal spray     Si sprays by Each Nare route 2 (Two) Times a Day.     Dispense:  16 g     Refill:  3          Patient understand(s) and agree(s) with the treatment plan as described.    Return in about 3 months (around 3/9/2021) for Recheck nose in allergy season.     Fernando Solano Jr, MD  20  09:12 CST

## 2021-01-25 DIAGNOSIS — E11.9 DIABETES MELLITUS WITHOUT COMPLICATION (HCC): Primary | ICD-10-CM

## 2021-01-25 RX ORDER — PEN NEEDLE, DIABETIC 31 GX5/16"
NEEDLE, DISPOSABLE MISCELLANEOUS
Qty: 100 EACH | Refills: 3 | Status: SHIPPED | OUTPATIENT
Start: 2021-01-25

## 2021-03-09 ENCOUNTER — OFFICE VISIT (OUTPATIENT)
Dept: OTOLARYNGOLOGY | Facility: CLINIC | Age: 71
End: 2021-03-09

## 2021-03-09 VITALS — WEIGHT: 184 LBS | BODY MASS INDEX: 25.76 KG/M2 | HEIGHT: 71 IN | HEART RATE: 60 BPM

## 2021-03-09 DIAGNOSIS — J34.3 NASAL TURBINATE HYPERTROPHY: ICD-10-CM

## 2021-03-09 DIAGNOSIS — J34.2 ACQUIRED DEVIATED NASAL SEPTUM: ICD-10-CM

## 2021-03-09 DIAGNOSIS — K21.9 GERD WITHOUT ESOPHAGITIS: ICD-10-CM

## 2021-03-09 DIAGNOSIS — R05.9 COUGH: Primary | ICD-10-CM

## 2021-03-09 DIAGNOSIS — J32.0 CHRONIC MAXILLARY SINUSITIS: ICD-10-CM

## 2021-03-09 PROCEDURE — 99213 OFFICE O/P EST LOW 20 MIN: CPT | Performed by: OTOLARYNGOLOGY

## 2021-03-09 RX ORDER — IPRATROPIUM BROMIDE 42 UG/1
2 SPRAY, METERED NASAL 4 TIMES DAILY PRN
Qty: 45 ML | Refills: 3 | Status: SHIPPED | OUTPATIENT
Start: 2021-03-09

## 2021-03-09 NOTE — PROGRESS NOTES
[facility] [place] OTOLARYNGOLOGY, HEAD AND NECK SURGERY CLINIC NOTE    Chief Complaint   Patient presents with   • Follow-up     recheck nose and allergies          HPI   Follow up  Accompanied by:  No one  Geoff Edwards is a 70 y.o. male who is here for follow up. He has had drainage and congestion. Nose is ok.  Cough is intermittent. He notes occasionally. Will cough and spit out.  Using nose spray. Not sure if helping.      History     Last Reviewed by Fernando Solano Jr., MD on 3/9/2021 at  9:08 AM    Sections Reviewed    Medical, Family, Tobacco, Surgical      Problem list reviewed by Fernando Solano Jr., MD on 3/9/2021 at  9:08 AM  Medicines reviewed by Fernando Solano Jr., MD on 3/9/2021 at  9:08 AM  Allergies reviewed by Fernando Solano Jr., MD on 3/9/2021 at  9:08 AM         Vital Signs:   Heart Rate:  [60] 60    Physical Exam  Constitutional:       General: He is awake.      Appearance: Normal appearance. He is well-groomed and normal weight.   HENT:      Head: Normocephalic and atraumatic.      Salivary Glands: Right salivary gland is not diffusely enlarged or tender. Left salivary gland is not diffusely enlarged or tender.      Right Ear: Hearing, tympanic membrane, ear canal and external ear normal.      Left Ear: Hearing, tympanic membrane, ear canal and external ear normal.      Nose: Septal deviation (L to R mid mod oblique, R to L low moderate) and rhinorrhea present.      Right Turbinates: Not enlarged, swollen or pale.      Left Turbinates: Not enlarged, swollen or pale.      Comments: Thick, bluish mucosa       Mouth/Throat:      Lips: Pink.      Mouth: Mucous membranes are moist.      Dentition: Normal dentition. No dental tenderness.      Tongue: No lesions. Tongue does not deviate from midline.      Palate: No lesions.      Pharynx: Oropharynx is clear. Uvula midline.      Tonsils: 1+ on the right. 1+ on the left.   Cardiovascular:      Rate and Rhythm: Normal rate and  regular rhythm.      Heart sounds: Normal heart sounds.   Pulmonary:      Effort: Pulmonary effort is normal.      Breath sounds: Normal breath sounds and air entry.   Neurological:      General: No focal deficit present.      Mental Status: He is alert.      Cranial Nerves: Cranial nerves are intact.      Gait: Gait is intact.   Psychiatric:         Attention and Perception: Attention and perception normal.         Mood and Affect: Mood and affect normal.         Speech: Speech normal.         Behavior: Behavior normal. Behavior is cooperative.         Thought Content: Thought content normal.         Cognition and Memory: Cognition and memory normal.             SnapShot   Notes   Encounters  Labs   Imaging   [employer]   Rslt Rev   :23}    Result Review    RESULTS REVIEW:    I have reviewed the patients old records in the chart.  The following results/records were reviewed:  CT sinus from 2020          Assessment:        Diagnosis Plan   1. Cough      Mild and intermittent   2. Acquired deviated nasal septum      L to R mid mod oblique  R to L low mod horizontal   3. Chronic maxillary sinusitis      BY CT   4. GERD without esophagitis     5. Nasal turbinate hypertrophy      ITs bilateral              Plan:        Medical and surgical options were discussed including observation, continued medical management, medication modification and surgical management. Risks, benefits and alternatives were discussed and questions were answered. After considering the options, the patient decided to proceed with continued medical management and medication modification.  Patient continues with mild intermittent cough.  He does not wish to explore surgery option at this point in time.  He does have turbinate hypertrophy and deviated nasal septum.  I cannot guarantee him that repairing these findings would guarantee he would have no further cough.  I will try him on Atrovent for the next 6 weeks.  If this does help then I will  plan to continue medical therapy.  The seasons are about the change, and this may contribute to his reduction in cough.  I will follow him through the spring season.  Atrovent  Flonase BID  Nasal saline  Consider surgery     New Medications Ordered This Visit   Medications   • ipratropium (ATROVENT) 0.06 % nasal spray     Si sprays into the nostril(s) as directed by provider 4 (Four) Times a Day As Needed for Rhinitis. For drainage     Dispense:  45 mL     Refill:  3          MY CHART:  Patient is Encouraged to enroll in My Chart  Encouraged to review data and findings in My Chart    Patient understand(s) and agree(s) with the treatment plan as described.    Return in about 6 weeks (around 2021) for Recheck Nose and cough, Surgical discussion.            Fernando Solano Jr, MD  21  09:09 CST

## 2021-03-09 NOTE — PATIENT INSTRUCTIONS
Ipratromium Bromide (Atrovent) spray use 1-2 puffs each nostril 3-4 times daily AS needed for drainage    NASAL SALINE:  Use 2 puffs each nostril 4-6 times daily and more frequently if possible.  You can buy saline spray or you can make your own and use an old spray bottle to administer  Use a humidifier at bedside  Recipe for saline:  Water                                 1 quart  Salt (table)                        1 tablespoon  Gylcerin (or Margret Syrup)    1 teaspoon  Sodium bicarbonate           1 teaspoon  Sprays or Olga pots are recommended    NASAL SALINE:  Use 2 puffs each nostril 4-6 times daily and more frequently if possible.  You can buy saline spray or you can make your own and use an old spray bottle to administer  Use a humidifier at bedside  Recipe for saline:  Water                                 1 quart  Salt (table)                        1 tablespoon  Gylcerin (or Margret Syrup)    1 teaspoon  Sodium bicarbonate           1 teaspoon  Sprays or Olga pots are recommended    Thank you for enrolling in [employer]. Please follow the instructions below to securely access your online medical record. [employer] allows you to send messages to your doctor, view your test results, renew your prescriptions, schedule appointments, and more.    How Do I Sign Up?  1. In your Internet browser, go to [employer]  2. Click on the Sign Up Now link in the New User? box.   3. Enter your [employer] Activation Code exactly as it appears below. You will not need to use this code after you have completed the sign-up process. If you do not sign up before the expiration date, you must request a new code.  [employer] Activation Code: [email or web address]  Expires: 4/8/2021  8:34 AM    4. Enter the last four digits of your Social Security Number and your Date of Birth as indicated and click Next. You will be taken to the next sign-up page.  5. Create a [employer] username. Think of one that is secure and easy to  remember.  6. Create a [employer] password. You can change your password at any time.  7. Choose a security question, enter your answer, and click Next. This can be used to access [employer] if you forget your password.   8. Select your communication preference. Enter a valid e-mail address if you would like to receive e-mail notifications when new information is available in [employer].  9. Click Sign In. You can now view your medical record.     Additional Information  If you have questions, you can e-mail [email or web address], or call [phone] to talk to our [employer] staff. Remember, [employer] is NOT to be used for urgent needs. For medical emergencies, dial 911.

## 2021-03-16 DIAGNOSIS — R97.20 ELEVATED PROSTATE SPECIFIC ANTIGEN (PSA): ICD-10-CM

## 2021-03-18 ENCOUNTER — OFFICE VISIT (OUTPATIENT)
Dept: INTERNAL MEDICINE | Facility: CLINIC | Age: 71
End: 2021-03-18

## 2021-03-18 VITALS
TEMPERATURE: 97.1 F | HEIGHT: 71 IN | WEIGHT: 184 LBS | OXYGEN SATURATION: 98 % | HEART RATE: 59 BPM | SYSTOLIC BLOOD PRESSURE: 150 MMHG | BODY MASS INDEX: 25.76 KG/M2 | DIASTOLIC BLOOD PRESSURE: 86 MMHG

## 2021-03-18 DIAGNOSIS — M71.21 BAKER'S CYST OF KNEE, RIGHT: ICD-10-CM

## 2021-03-18 DIAGNOSIS — I10 BENIGN HYPERTENSION: ICD-10-CM

## 2021-03-18 DIAGNOSIS — E11.9 ENCOUNTER FOR DIABETIC FOOT EXAM (HCC): ICD-10-CM

## 2021-03-18 DIAGNOSIS — Z11.59 NEED FOR HEPATITIS C SCREENING TEST: ICD-10-CM

## 2021-03-18 DIAGNOSIS — E11.9 DIABETES MELLITUS WITHOUT COMPLICATION (HCC): ICD-10-CM

## 2021-03-18 DIAGNOSIS — Z79.899 ENCOUNTER FOR LONG-TERM CURRENT USE OF MEDICATION: ICD-10-CM

## 2021-03-18 DIAGNOSIS — Z00.00 WELLNESS EXAMINATION: Primary | ICD-10-CM

## 2021-03-18 DIAGNOSIS — Z12.5 SCREENING PSA (PROSTATE SPECIFIC ANTIGEN): ICD-10-CM

## 2021-03-18 LAB
HBA1C MFR BLD: 5.7 %
PSA FREE MFR SERPL: 20.3 %
PSA FREE SERPL-MCNC: 1.44 NG/ML
PSA SERPL-MCNC: 7.1 NG/ML (ref 0–4)

## 2021-03-18 PROCEDURE — G0439 PPPS, SUBSEQ VISIT: HCPCS | Performed by: INTERNAL MEDICINE

## 2021-03-18 PROCEDURE — 83036 HEMOGLOBIN GLYCOSYLATED A1C: CPT | Performed by: INTERNAL MEDICINE

## 2021-03-18 NOTE — PROGRESS NOTES
The ABCs of the Annual Wellness Visit  Subsequent Medicare Wellness Visit    Chief Complaint   Patient presents with   • Medicare Wellness-subsequent     a1c: 5.7   • Leg Pain     right leg pain; started 3-4 months ago, seems to bother him more when standing        Subjective   History of Present Illness:  Geoff Edwards is a 70 y.o. male who presents for a Subsequent Medicare Wellness Visit.    HEALTH RISK ASSESSMENT    Recent Hospitalizations:  No hospitalization(s) within the last year.    Current Medical Providers:  Patient Care Team:  Heath Flores MD as PCP - General (Internal Medicine)  Ric Tobin MD as Consulting Physician (Urology)  Fernando Solano Jr., MD as Consulting Physician (Otolaryngology)    Smoking Status:  Social History     Tobacco Use   Smoking Status Never Smoker   Smokeless Tobacco Never Used       Alcohol Consumption:  Social History     Substance and Sexual Activity   Alcohol Use No       Depression Screen:   PHQ-2/PHQ-9 Depression Screening 3/18/2021   Little interest or pleasure in doing things 0   Feeling down, depressed, or hopeless 0   Total Score 0       Fall Risk Screen:  STEADI Fall Risk Assessment was completed, and patient is at LOW risk for falls.Assessment completed on:3/18/2021    Health Habits and Functional and Cognitive Screening:  Functional & Cognitive Status 3/18/2021   Do you have difficulty preparing food and eating? No   Do you have difficulty bathing yourself, getting dressed or grooming yourself? No   Do you have difficulty using the toilet? No   Do you have difficulty moving around from place to place? No   Do you have trouble with steps or getting out of a bed or a chair? No   Current Diet Well Balanced Diet   Dental Exam Up to date   Eye Exam Up to date   Exercise (times per week) 3 times per week   Current Exercise Activities Include Aerobics   Do you need help using the phone?  No   Are you deaf or do you have serious difficulty  hearing?  No   Do you need help with transportation? No   Do you need help shopping? No   Do you need help preparing meals?  No   Do you need help with housework?  No   Do you need help with laundry? No   Do you need help taking your medications? No   Do you need help managing money? No   Do you ever drive or ride in a car without wearing a seat belt? No   Have you felt unusual stress, anger or loneliness in the last month? No   Who do you live with? Spouse   If you need help, do you have trouble finding someone available to you? No   Have you been bothered in the last four weeks by sexual problems? No   Do you have difficulty concentrating, remembering or making decisions? No         Does the patient have evidence of cognitive impairment? No    Asprin use counseling:Does not need ASA (and currently is not on it)    Age-appropriate Screening Schedule:  Refer to the list below for future screening recommendations based on patient's age, sex and/or medical conditions. Orders for these recommended tests are listed in the plan section. The patient has been provided with a written plan.    Health Maintenance   Topic Date Due   • URINE MICROALBUMIN  Never done   • TDAP/TD VACCINES (1 - Tdap) Never done   • ZOSTER VACCINE (1 of 2) Never done   • DIABETIC EYE EXAM  06/15/2021   • HEMOGLOBIN A1C  09/18/2021   • DIABETIC FOOT EXAM  03/18/2022   • COLONOSCOPY  11/21/2027   • INFLUENZA VACCINE  Completed          The following portions of the patient's history were reviewed and updated as appropriate: allergies, current medications, past family history, past medical history, past social history, past surgical history and problem list.    Outpatient Medications Prior to Visit   Medication Sig Dispense Refill   • CONTOUR NEXT TEST test strip USE ONE EVERY  each 3   • fluticasone (FLONASE) 50 MCG/ACT nasal spray 2 sprays by Each Nare route 2 (Two) Times a Day. 16 g 3   • ipratropium (ATROVENT) 0.06 % nasal spray 2 sprays  into the nostril(s) as directed by provider 4 (Four) Times a Day As Needed for Rhinitis. For drainage 45 mL 3   • irbesartan (AVAPRO) 300 MG tablet TAKE 1 TABLET BY MOUTH DAILY 90 tablet 3   • Lancets Ultra Thin misc 1 (ONE) MISC DAILY 100 each 3     No facility-administered medications prior to visit.       Patient Active Problem List   Diagnosis   • Encounter for screening for malignant neoplasm of colon   • Benign hypertension   • Shoulder strain, left, sequela   • Elevated prostate specific antigen (PSA)   • Diabetes mellitus without complication (CMS/HCC)   • Rotator cuff strain, right, subsequent encounter   • Hip pain, chronic, right   • Baker's cyst of knee, right       Advanced Care Planning:  ACP discussion was held with the patient during this visit. Patient does not have an advance directive, information provided.    Review of Systems   Constitutional: Negative for activity change, appetite change and chills.   HENT: Negative for congestion, ear pain and facial swelling.    Eyes: Negative for pain, discharge and itching.   Respiratory: Negative for apnea, cough and shortness of breath.    Cardiovascular: Negative for chest pain, palpitations and leg swelling.   Gastrointestinal: Negative for abdominal distention, abdominal pain and anal bleeding.   Endocrine: Negative for cold intolerance and heat intolerance.   Genitourinary: Negative for difficulty urinating, dysuria and flank pain.   Musculoskeletal: Positive for arthralgias ( Planes of right upper leg discomfort). Negative for back pain and joint swelling.   Skin: Negative for color change, pallor and rash.   Allergic/Immunologic: Negative for environmental allergies and food allergies.   Neurological: Negative for dizziness and facial asymmetry.   Hematological: Negative for adenopathy. Does not bruise/bleed easily.   Psychiatric/Behavioral: Negative for agitation, behavioral problems and confusion.       Compared to one year ago, the patient feels  "his physical health is the same.  Compared to one year ago, the patient feels his mental health is the same.    Reviewed chart for potential of high risk medication in the elderly: yes  Reviewed chart for potential of harmful drug interactions in the elderly:yes    Objective         Vitals:    03/18/21 0911   BP: 150/86   BP Location: Left arm   Patient Position: Sitting   Cuff Size: Adult   Pulse: 59   Temp: 97.1 °F (36.2 °C)   TempSrc: Temporal   SpO2: 98%   Weight: 83.5 kg (184 lb)   Height: 180.3 cm (71\")   PainSc:   2   PainLoc: Leg  Comment: right       Body mass index is 25.66 kg/m².  Discussed the patient's BMI with him. The BMI is in the acceptable range.    Physical Exam  Vitals and nursing note reviewed.   Constitutional:       General: He is not in acute distress.     Appearance: Normal appearance. He is well-developed.   HENT:      Head: Normocephalic and atraumatic.      Right Ear: External ear normal.      Left Ear: External ear normal.      Nose: Nose normal.   Eyes:      Extraocular Movements: Extraocular movements intact.      Conjunctiva/sclera: Conjunctivae normal.      Pupils: Pupils are equal, round, and reactive to light.   Cardiovascular:      Rate and Rhythm: Normal rate and regular rhythm.      Pulses: Normal pulses.      Heart sounds: Normal heart sounds.   Pulmonary:      Effort: Pulmonary effort is normal.      Breath sounds: Normal breath sounds.   Abdominal:      General: Bowel sounds are normal.      Palpations: Abdomen is soft.   Musculoskeletal:         General: Normal range of motion.      Cervical back: Normal range of motion and neck supple. No rigidity. No muscular tenderness.      Comments: Baker's cyst behind right knee   Skin:     General: Skin is warm and dry.   Neurological:      General: No focal deficit present.      Mental Status: He is alert and oriented to person, place, and time.   Psychiatric:         Mood and Affect: Mood normal.         Behavior: Behavior normal. "         Lab Results   Component Value Date    HGBA1C 5.7 03/18/2021        Assessment/Plan   Medicare Risks and Personalized Health Plan  CMS Preventative Services Quick Reference  Advance Directive Discussion  Cardiovascular risk  Colon Cancer Screening  Immunizations Discussed/Encouraged (specific immunizations; adacel Tdap and Shingrix )  Prostate Cancer Screening     The above risks/problems have been discussed with the patient.  Pertinent information has been shared with the patient in the After Visit Summary.  Follow up plans and orders are seen below in the Assessment/Plan Section.    Diagnoses and all orders for this visit:    1. Wellness examination (Primary)    2. Need for hepatitis C screening test  -     Hepatitis C Antibody    3. Screening PSA (prostate specific antigen)  -     PSA Screen    4. Encounter for diabetic foot exam (CMS/HCC)  -     POC Glycosylated Hemoglobin (Hb A1C)    5. Benign hypertension  -     Comprehensive Metabolic Panel  -     Uric Acid  -     Urinalysis With Microscopic - Urine, Clean Catch    6. Diabetes mellitus without complication (CMS/[facility])  -     MicroAlbumin, Urine, Random - Urine, Clean Catch    7. Encounter for long-term current use of medication  -     CBC & Differential  -     Lipid Panel  -     TSH    8. Baker's cyst of knee, right      Follow Up:  Return in about 6 months (around 9/18/2021).  This was patient's annual wellness evaluation his blood pressure is out of range he works at a pharmacy I am asking him to have his blood pressure checked a few times there if he calls back his blood pressure remains elevated I would like to add medication to his regimen.  In regard to his right leg pain he does have a Baker's cyst I wonder if he is getting some discomfort in the right upper leg associated with the arthritis in his knee.  I have explained to him that we could inject his knee if he continues to have trouble or certainly refer him to orthopedic surgery.    An After  Visit Summary and PPPS were given to the patient.

## 2021-03-19 LAB
ALBUMIN SERPL-MCNC: 4.2 G/DL (ref 3.5–5.2)
ALBUMIN/GLOB SERPL: 1.7 G/DL
ALP SERPL-CCNC: 81 U/L (ref 39–117)
ALT SERPL-CCNC: 22 U/L (ref 1–41)
APPEARANCE UR: CLEAR
AST SERPL-CCNC: 23 U/L (ref 1–40)
BACTERIA #/AREA URNS HPF: NORMAL /HPF
BASOPHILS # BLD AUTO: NORMAL 10*3/UL
BASOPHILS # BLD MANUAL: 0.07 10*3/MM3 (ref 0–0.2)
BASOPHILS NFR BLD MANUAL: 1 % (ref 0–1.5)
BILIRUB SERPL-MCNC: 1.2 MG/DL (ref 0–1.2)
BILIRUB UR QL STRIP: NEGATIVE
BUN SERPL-MCNC: 14 MG/DL (ref 8–23)
BUN/CREAT SERPL: 11.5 (ref 7–25)
CALCIUM SERPL-MCNC: 9.3 MG/DL (ref 8.6–10.5)
CASTS URNS MICRO: NORMAL
CHLORIDE SERPL-SCNC: 107 MMOL/L (ref 98–107)
CHOLEST SERPL-MCNC: 161 MG/DL (ref 0–200)
CO2 SERPL-SCNC: 25.8 MMOL/L (ref 22–29)
COLOR UR: YELLOW
CREAT SERPL-MCNC: 1.22 MG/DL (ref 0.76–1.27)
DIFFERENTIAL COMMENT: ABNORMAL
EOSINOPHIL # BLD AUTO: NORMAL 10*3/UL
EOSINOPHIL # BLD MANUAL: 0.13 10*3/MM3 (ref 0–0.4)
EOSINOPHIL NFR BLD AUTO: NORMAL %
EOSINOPHIL NFR BLD MANUAL: 2 % (ref 0.3–6.2)
EPI CELLS #/AREA URNS HPF: NORMAL /HPF
ERYTHROCYTE [DISTWIDTH] IN BLOOD BY AUTOMATED COUNT: 13.2 % (ref 12.3–15.4)
GLOBULIN SER CALC-MCNC: 2.5 GM/DL
GLUCOSE SERPL-MCNC: 99 MG/DL (ref 65–99)
GLUCOSE UR QL: NEGATIVE
HCT VFR BLD AUTO: 40.3 % (ref 37.5–51)
HCV AB S/CO SERPL IA: <0.1 S/CO RATIO (ref 0–0.9)
HDLC SERPL-MCNC: 63 MG/DL (ref 40–60)
HGB BLD-MCNC: 13.9 G/DL (ref 13–17.7)
HGB UR QL STRIP: NEGATIVE
KETONES UR QL STRIP: NEGATIVE
LDLC SERPL CALC-MCNC: 84 MG/DL (ref 0–100)
LEUKOCYTE ESTERASE UR QL STRIP: NEGATIVE
LYMPHOCYTES # BLD AUTO: NORMAL 10*3/UL
LYMPHOCYTES # BLD MANUAL: 1.54 10*3/MM3 (ref 0.7–3.1)
LYMPHOCYTES NFR BLD AUTO: NORMAL %
LYMPHOCYTES NFR BLD MANUAL: 23.5 % (ref 19.6–45.3)
MCH RBC QN AUTO: 32 PG (ref 26.6–33)
MCHC RBC AUTO-ENTMCNC: 34.5 G/DL (ref 31.5–35.7)
MCV RBC AUTO: 92.9 FL (ref 79–97)
MICROALBUMIN UR-MCNC: 4.1 UG/ML
MONOCYTES # BLD MANUAL: 0.27 10*3/MM3 (ref 0.1–0.9)
MONOCYTES NFR BLD AUTO: NORMAL %
MONOCYTES NFR BLD MANUAL: 4.1 % (ref 5–12)
NEUTROPHILS # BLD MANUAL: 4.34 10*3/MM3 (ref 1.7–7)
NEUTROPHILS NFR BLD AUTO: NORMAL %
NEUTROPHILS NFR BLD MANUAL: 66.3 % (ref 42.7–76)
NITRITE UR QL STRIP: NEGATIVE
PH UR STRIP: 6.5 [PH] (ref 5–8)
PLATELET # BLD AUTO: 155 10*3/MM3 (ref 140–450)
PLATELET BLD QL SMEAR: ABNORMAL
POTASSIUM SERPL-SCNC: 4.1 MMOL/L (ref 3.5–5.2)
PROT SERPL-MCNC: 6.7 G/DL (ref 6–8.5)
PROT UR QL STRIP: NEGATIVE
RBC # BLD AUTO: 4.34 10*6/MM3 (ref 4.14–5.8)
RBC #/AREA URNS HPF: NORMAL /HPF
RBC MORPH BLD: ABNORMAL
SODIUM SERPL-SCNC: 142 MMOL/L (ref 136–145)
SP GR UR: 1.02 (ref 1–1.03)
TRIGL SERPL-MCNC: 71 MG/DL (ref 0–150)
TSH SERPL DL<=0.005 MIU/L-ACNC: 3.35 UIU/ML (ref 0.27–4.2)
URATE SERPL-MCNC: 3.5 MG/DL (ref 3.4–7)
UROBILINOGEN UR STRIP-MCNC: NORMAL MG/DL
VLDLC SERPL CALC-MCNC: 14 MG/DL (ref 5–40)
WBC # BLD AUTO: 6.54 10*3/MM3 (ref 3.4–10.8)
WBC #/AREA URNS HPF: NORMAL /HPF

## 2021-03-23 ENCOUNTER — TELEPHONE (OUTPATIENT)
Dept: INTERNAL MEDICINE | Facility: CLINIC | Age: 71
End: 2021-03-23

## 2021-03-23 NOTE — PROGRESS NOTES
Subjective    Mr. Edwards is 70 y.o. male    Chief Complaint: Elevated PSA.     History of Present Illness  Elevated PSA  Patient is here with an elevated PSA. The PSA was drawn1 week. He does not have a family history of prostate cancer. His AUA Symptom Score is 6 /35. Voiding symptoms include Incomplete emptying,  Weakened stream and Nocturia. Denies Urgency and Straining. Voiding symptoms began several years  . These have been gradual in onset. negative--10/12 for PSA of 7 & PSA 8.5 3/19 negative MRI/Fusion biopsy    Lab Results   Component Value Date    PSA 7.1 (H) 03/17/2021    PSA 7.5 (H) 09/16/2020    PSA 8.5 (H) 02/10/2020         The following portions of the patient's history were reviewed and updated as appropriate: allergies, current medications, past family history, past medical history, past social history, past surgical history and problem list.    Review of Systems   Constitutional: Negative for chills and fever.   Gastrointestinal: Negative for abdominal pain, anal bleeding and blood in stool.   Genitourinary: Negative for dysuria, frequency, hematuria and urgency.         Current Outpatient Medications:   •  CONTOUR NEXT TEST test strip, USE ONE EVERY DAY, Disp: 100 each, Rfl: 3  •  fluticasone (FLONASE) 50 MCG/ACT nasal spray, 2 sprays by Each Nare route 2 (Two) Times a Day., Disp: 16 g, Rfl: 3  •  ipratropium (ATROVENT) 0.06 % nasal spray, 2 sprays into the nostril(s) as directed by provider 4 (Four) Times a Day As Needed for Rhinitis. For drainage, Disp: 45 mL, Rfl: 3  •  irbesartan (AVAPRO) 300 MG tablet, TAKE 1 TABLET BY MOUTH DAILY, Disp: 90 tablet, Rfl: 3  •  Lancets Ultra Thin misc, 1 (ONE) MISC DAILY, Disp: 100 each, Rfl: 3    Past Medical History:   Diagnosis Date   • Abnormal PSA    • Diabetes mellitus (CMS/HCC)     diet controlled   • Hypertension    • PONV (postoperative nausea and vomiting)        Past Surgical History:   Procedure Laterality Date   • APPENDECTOMY     • COLONOSCOPY  " 11/16/2012    Normal. Repeat in 5 years. Dr. Tez Perdomo   • COLONOSCOPY N/A 11/21/2017    Procedure: COLONOSCOPY WITH ANESTHESIA;  Surgeon: Tez Perdomo MD;  Location: [facility] ENDOSCOPY;  Service:    • MANDIBLE SURGERY     • PROSTATE BIOPSY     • PROSTATE BIOPSY N/A 3/19/2020    Procedure: PROSTATE ULTRASOUND BIOPSY MRI FUSION WITH URONAV;  Surgeon: Ric Tobin MD;  Location: [facility] OR;  Service: Urology;  Laterality: N/A;       Social History     Socioeconomic History   • Marital status:      Spouse name: Not on file   • Number of children: Not on file   • Years of education: Not on file   • Highest education level: Not on file   Tobacco Use   • Smoking status: Never Smoker   • Smokeless tobacco: Never Used   Vaping Use   • Vaping Use: Never used   Substance and Sexual Activity   • Alcohol use: No   • Drug use: No   • Sexual activity: Defer       Family History   Problem Relation Age of Onset   • Heart disease Father    • No Known Problems Mother    • No Known Problems Brother    • No Known Problems Brother    • No Known Problems Sister    • No Known Problems Sister    • No Known Problems Sister    • No Known Problems Sister    • Stomach cancer Sister    • Cancer Sister 69   • Colon cancer Neg Hx    • Colon polyps Neg Hx        Objective    Temp 96.9 °F (36.1 °C) (Temporal)   Ht 180.3 cm (71\")   Wt 83.4 kg (183 lb 12.8 oz)   BMI 25.63 kg/m²     Physical Exam  Vitals reviewed.   Constitutional:       Appearance: He is well-developed.   Pulmonary:      Effort: Pulmonary effort is normal.   Abdominal:      General: There is no distension.      Palpations: Abdomen is soft. There is no mass.      Tenderness: There is no abdominal tenderness. There is no guarding or rebound.      Hernia: No hernia is present.   Genitourinary:     Penis: No hypospadias or discharge.       Testes:         Right: Mass, tenderness or swelling not present.         Left: Mass, tenderness or swelling not present.      " Prostate: Enlarged: for the age of the patient.      Rectum: No mass or tenderness. Normal anal tone.      Comments:  Anus and perineum without mass or tenderness. The prostate is approximately 70 ml. It is Symmetric, with a Soft consistency. There are no nodules present. . The seminal vesicles are Not palpable due to the size of the prostate.    Neurological:      Mental Status: He is alert and oriented to person, place, and time.             Results for orders placed or performed in visit on 03/24/21   POC Urinalysis Dipstick, Multipro    Specimen: Urine   Result Value Ref Range    Color Yellow Yellow, Straw, Dark Yellow, Clarissa    Clarity, UA Clear Clear    Glucose, UA Negative Negative, 1000 mg/dL (3+) mg/dL    Bilirubin Negative Negative    Ketones, UA Negative Negative    Specific Gravity  1.030 1.005 - 1.030    Blood, UA Trace (A) Negative    pH, Urine 5.5 5.0 - 8.0    Protein, POC 1+ (A) Negative mg/dL    Urobilinogen, UA 1 E.U./dL  (A) Normal    Nitrite, UA Negative Negative    Leukocytes Negative Negative     Assessment and Plan    Diagnoses and all orders for this visit:    1. Elevated prostate specific antigen (PSA) (Primary)  -     POC Urinalysis Dipstick, Multipro    2. BPH with urinary obstruction    Patient underwent a biopsy in 2012 for PSA of 7.   He had a 4K score of 4 in 2017 which indicates a very low risk prostate cancer.          MRI for up PSA of 8.5 showed 45cc gland with 1 cm PIRADS 4 lesion in right mid peripheral zone.   He underwent an MRI fusion biopsy in March 2020 which was negative for cancer.     He returns today with a PSA of 7.1 with a percent free of 20%.  We will continue to observe this.  He has had 2- biopsies now.  He will follow-up with me in one year repeat PSA.

## 2021-03-24 ENCOUNTER — OFFICE VISIT (OUTPATIENT)
Dept: UROLOGY | Facility: CLINIC | Age: 71
End: 2021-03-24

## 2021-03-24 VITALS — HEIGHT: 71 IN | TEMPERATURE: 96.9 F | BODY MASS INDEX: 25.73 KG/M2 | WEIGHT: 183.8 LBS

## 2021-03-24 DIAGNOSIS — R97.20 ELEVATED PROSTATE SPECIFIC ANTIGEN (PSA): Primary | ICD-10-CM

## 2021-03-24 DIAGNOSIS — N40.1 BPH WITH URINARY OBSTRUCTION: ICD-10-CM

## 2021-03-24 DIAGNOSIS — N13.8 BPH WITH URINARY OBSTRUCTION: ICD-10-CM

## 2021-03-24 LAB
BILIRUB BLD-MCNC: NEGATIVE MG/DL
CLARITY, POC: CLEAR
COLOR UR: YELLOW
GLUCOSE UR STRIP-MCNC: NEGATIVE MG/DL
KETONES UR QL: NEGATIVE
LEUKOCYTE EST, POC: NEGATIVE
NITRITE UR-MCNC: NEGATIVE MG/ML
PH UR: 5.5 [PH] (ref 5–8)
PROT UR STRIP-MCNC: ABNORMAL MG/DL
RBC # UR STRIP: ABNORMAL /UL
SP GR UR: 1.03 (ref 1–1.03)
UROBILINOGEN UR QL: ABNORMAL

## 2021-03-24 PROCEDURE — 99213 OFFICE O/P EST LOW 20 MIN: CPT | Performed by: UROLOGY

## 2021-03-24 PROCEDURE — 81003 URINALYSIS AUTO W/O SCOPE: CPT | Performed by: UROLOGY

## 2021-04-21 ENCOUNTER — OFFICE VISIT (OUTPATIENT)
Dept: OTOLARYNGOLOGY | Facility: CLINIC | Age: 71
End: 2021-04-21

## 2021-04-21 VITALS — SYSTOLIC BLOOD PRESSURE: 135 MMHG | DIASTOLIC BLOOD PRESSURE: 91 MMHG | HEART RATE: 69 BPM

## 2021-04-21 DIAGNOSIS — J34.3 NASAL TURBINATE HYPERTROPHY: ICD-10-CM

## 2021-04-21 DIAGNOSIS — J34.2 ACQUIRED DEVIATED NASAL SEPTUM: Primary | ICD-10-CM

## 2021-04-21 DIAGNOSIS — K21.9 GERD WITHOUT ESOPHAGITIS: ICD-10-CM

## 2021-04-21 DIAGNOSIS — J32.0 CHRONIC MAXILLARY SINUSITIS: ICD-10-CM

## 2021-04-21 PROCEDURE — 99213 OFFICE O/P EST LOW 20 MIN: CPT | Performed by: OTOLARYNGOLOGY

## 2021-04-21 NOTE — PROGRESS NOTES
[facility] [place] OTOLARYNGOLOGY, HEAD AND NECK SURGERY CLINIC NOTE    Chief Complaint   Patient presents with   • Follow-up     recheck cough          HPI   Follow up  Accompanied by:  No one  Geoff Edwards is a 70 y.o. male who is here for follow up. He has had no issues. Atrovent works well.  He wishes no surgery.      History     Last Reviewed by Fernando Solano Jr., MD on 4/21/2021 at 10:04 AM    Sections Reviewed    Medical, Family, Tobacco, Surgical      Problem list reviewed by Fernando Solano Jr., MD on 4/21/2021 at 10:04 AM  Medicines reviewed by Fernando Solano Jr., MD on 4/21/2021 at 10:04 AM  Allergies reviewed by Fernando Solano Jr., MD on 4/21/2021 at 10:04 AM         Vital Signs:   Heart Rate:  [69] 69  BP: (135)/(91) 135/91    Physical Exam  Constitutional:       General: He is awake.      Appearance: Normal appearance. He is well-groomed and normal weight.   HENT:      Head: Normocephalic and atraumatic.      Salivary Glands: Right salivary gland is not diffusely enlarged or tender. Left salivary gland is not diffusely enlarged or tender.      Right Ear: Hearing, tympanic membrane, ear canal and external ear normal.      Left Ear: Hearing, tympanic membrane, ear canal and external ear normal.      Nose: Septal deviation (L to R mid mod oblique, R to L low moderate) and rhinorrhea (mild) present.      Right Turbinates: Not enlarged, swollen or pale.      Left Turbinates: Not enlarged, swollen or pale.      Comments: Thick, bluish mucosa       Mouth/Throat:      Lips: Pink.      Mouth: Mucous membranes are moist.      Dentition: Normal dentition. No dental tenderness.      Tongue: No lesions. Tongue does not deviate from midline.      Palate: No lesions.      Pharynx: Oropharynx is clear. Uvula midline.      Tonsils: 1+ on the right. 1+ on the left.   Eyes:      General: Lids are normal. Gaze aligned appropriately.      Extraocular Movements: Extraocular movements intact.       Conjunctiva/sclera: Conjunctivae normal.   Neck:      Thyroid: No thyroid mass or thyromegaly.   Cardiovascular:      Rate and Rhythm: Normal rate and regular rhythm.      Heart sounds: Normal heart sounds.   Pulmonary:      Effort: Pulmonary effort is normal. No respiratory distress.      Breath sounds: Normal breath sounds and air entry.   Musculoskeletal:      Cervical back: Decreased range of motion.   Lymphadenopathy:      Cervical: No cervical adenopathy.   Neurological:      General: No focal deficit present.      Mental Status: He is alert and oriented to person, place, and time.      Cranial Nerves: Cranial nerves are intact.      Gait: Gait is intact.   Psychiatric:         Attention and Perception: Attention and perception normal.         Mood and Affect: Mood and affect normal.         Speech: Speech normal.         Behavior: Behavior normal. Behavior is cooperative.         Thought Content: Thought content normal.         Cognition and Memory: Cognition and memory normal.             SnapShot   Notes   Encounters  Labs   Imaging   [employer]chanic   Rslt Rev   :23}    Result Review    RESULTS REVIEW:    I have reviewed the patients old records in the chart.            Assessment:        Diagnosis Plan   1. Acquired deviated nasal septum      No change   2. Chronic maxillary sinusitis      Proved on medical therapy   3. GERD without esophagitis      Improved   4. Nasal turbinate hypertrophy      Improved on medical therapy              Plan:        Continue current management plan.  Patient has proved on his current medical therapy.  His symptoms have markedly improved and he wishes to continue with medication.  He does not wish surgery at this time.  I will refer him back to his primary care doctor for any refills.  The patient is instructed to call if his symptoms return or he requires any other ENT evaluation.  Atrovent  Flonase BID  Nasal saline  Refer to PCP for chronic refills  Call for nose  problems.             MY CHART:  Patient is Encouraged to enroll in My Chart  Encouraged to review data and findings in My Chart    Patient understand(s) and agree(s) with the treatment plan as described.    Return if symptoms worsen or fail to improve, for Recheck Nose.            Fernando Solano Jr, MD  04/21/21  10:06 CDT

## 2021-04-21 NOTE — PATIENT INSTRUCTIONS
NASAL SALINE:  Use 2 puffs each nostril 4-6 times daily and more frequently if possible.  You can buy saline spray or you can make your own and use an old spray bottle to administer  Use a humidifier at bedside  Recipe for saline:  Water                                 1 quart  Salt (table)                        1 tablespoon  Gylcerin (or Margret Syrup)    1 teaspoon  Sodium bicarbonate           1 teaspoon  Sprays or [place] pots are recommended    Nasal steroid use:  Using nasal steroids:  You will be prescribed one of the following nasal steroids: Flonase, Nasacort, Nasonex, Rhinocort, Qnasl, Zetonna  2 puffs each nostril 2 times daily  Start as soon as possible  If you are using Afrin for 3 days with the nasal steroid,  Use Afrin first and wait 10 minutes to allow the nose to open. Then administer nasal steroids.    Ipratromium Bromide (Atrovent) spray use 1-2 puffs each nostril 3-4 times daily AS needed for drainage    See Dr Flores for refills.    Call back for nose problems.    CONTACT INFORMATION:  The main office phone number is [phone]. For emergencies after hours and on weekends, this number will convert over to our answering service and the on call provider will answer. Please try to keep non emergent phone calls/ questions to office hours 9am-5pm Monday through Friday.     [employer]  As an alternative, you can sign up and use the Epic MyChart system for more direct and quicker access for non emergent questions/ problems.  [facility] [employer] allows you to send messages to your doctor, view your test results, renew your prescriptions, schedule appointments, and more. To sign up, go to [employer] and click on the Sign Up Now link in the New User? box. Enter your [employer] Activation Code exactly as it appears below along with the last four digits of your Social Security Number and your Date of Birth () to complete the sign-up process. If you do not sign up before the expiration date,  you must request a new code.    [email or web address] Activation Code: [email or web address]  Expires: 5/21/2021  9:01 AM    If you have questions, you can email [email or web address] or call [phone] to talk to our [email or web address] staff. Remember, [email or web address] is NOT to be used for urgent needs. For medical emergencies, dial 911.

## 2021-06-03 ENCOUNTER — TELEPHONE (OUTPATIENT)
Dept: INTERNAL MEDICINE | Facility: CLINIC | Age: 71
End: 2021-06-03

## 2021-06-03 NOTE — TELEPHONE ENCOUNTER
Caller: Geoff Gee    Relationship: Self    Best call back number: [phone]     What is the best time to reach you: ANYTIME     Who are you requesting to speak with (clinical staff, provider,  specific staff member): CLINICAL STAFF     Do you know the name of the person who called: GEOFF GEE     What was the call regarding: PATIENT IS NEEDING SOME BLOOD SUGAR GLUCOSE LOG SHEET. PLEASE CALL AND ADVISE. PATIENT WILL PICK THEM UP WHEN THEY'RE READY    Do you require a callback: YES

## 2021-06-08 ENCOUNTER — OFFICE VISIT (OUTPATIENT)
Dept: INTERNAL MEDICINE | Facility: CLINIC | Age: 71
End: 2021-06-08

## 2021-06-08 ENCOUNTER — HOSPITAL ENCOUNTER (OUTPATIENT)
Dept: GENERAL RADIOLOGY | Facility: HOSPITAL | Age: 71
Discharge: HOME | End: 2021-06-08
Admitting: INTERNAL MEDICINE

## 2021-06-08 VITALS
HEART RATE: 63 BPM | OXYGEN SATURATION: 99 % | HEIGHT: 71 IN | DIASTOLIC BLOOD PRESSURE: 86 MMHG | BODY MASS INDEX: 25.34 KG/M2 | TEMPERATURE: 97.1 F | SYSTOLIC BLOOD PRESSURE: 140 MMHG | WEIGHT: 181 LBS

## 2021-06-08 DIAGNOSIS — M54.31 SCIATICA OF RIGHT SIDE: ICD-10-CM

## 2021-06-08 DIAGNOSIS — M54.31 SCIATICA OF RIGHT SIDE: Primary | ICD-10-CM

## 2021-06-08 PROCEDURE — 72110 X-RAY EXAM L-2 SPINE 4/>VWS: CPT

## 2021-06-08 PROCEDURE — 99213 OFFICE O/P EST LOW 20 MIN: CPT | Performed by: INTERNAL MEDICINE

## 2021-06-08 NOTE — PROGRESS NOTES
Subjective   Geoff Edwards is a 71 y.o. male.   Chief Complaint   Patient presents with   • Leg Pain     right leg pain; ongoing issue; back of thigh; especially after he has been sitting a while        History of Present Illness patient is having right leg pain which has been going on for about a year now.  He is concerned he may have a blood clot.  He has not done anything that recently injured his leg.    The following portions of the patient's history were reviewed and updated as appropriate: allergies, current medications, past family history, past medical history, past social history, past surgical history and problem list.    Review of Systems   Constitutional: Negative for activity change, appetite change, fatigue, fever, unexpected weight gain and unexpected weight loss.   HENT: Negative for swollen glands, trouble swallowing and voice change.    Eyes: Negative for blurred vision and visual disturbance.   Respiratory: Negative for cough and shortness of breath.    Cardiovascular: Negative for chest pain, palpitations and leg swelling.   Gastrointestinal: Negative for abdominal pain, constipation, diarrhea, nausea, vomiting and indigestion.   Endocrine: Negative for cold intolerance, heat intolerance, polydipsia and polyphagia.   Genitourinary: Negative for dysuria and frequency.   Musculoskeletal: Negative for arthralgias, back pain, joint swelling and neck pain.   Skin: Negative for color change, rash and skin lesions.   Neurological: Negative for dizziness, weakness, headache, memory problem and confusion.   Hematological: Does not bruise/bleed easily.   Psychiatric/Behavioral: Negative for agitation, hallucinations and suicidal ideas. The patient is not nervous/anxious.        Objective   Past Medical History:   Diagnosis Date   • Abnormal PSA    • Diabetes mellitus (CMS/HCC)     diet controlled   • Hypertension    • PONV (postoperative nausea and vomiting)       Past Surgical History:   Procedure  Laterality Date   • APPENDECTOMY     • COLONOSCOPY  11/16/2012    Normal. Repeat in 5 years. Dr. Tez Perdomo   • COLONOSCOPY N/A 11/21/2017    Procedure: COLONOSCOPY WITH ANESTHESIA;  Surgeon: Tez Perdomo MD;  Location: [facility] ENDOSCOPY;  Service:    • MANDIBLE SURGERY     • PROSTATE BIOPSY     • PROSTATE BIOPSY N/A 3/19/2020    Procedure: PROSTATE ULTRASOUND BIOPSY MRI FUSION WITH URONAV;  Surgeon: Ric Tobin MD;  Location: [facility] OR;  Service: Urology;  Laterality: N/A;        Current Outpatient Medications:   •  CONTOUR NEXT TEST test strip, USE ONE EVERY DAY, Disp: 100 each, Rfl: 3  •  fluticasone (FLONASE) 50 MCG/ACT nasal spray, 2 sprays by Each Nare route 2 (Two) Times a Day., Disp: 16 g, Rfl: 3  •  ipratropium (ATROVENT) 0.06 % nasal spray, 2 sprays into the nostril(s) as directed by provider 4 (Four) Times a Day As Needed for Rhinitis. For drainage, Disp: 45 mL, Rfl: 3  •  irbesartan (AVAPRO) 300 MG tablet, TAKE 1 TABLET BY MOUTH DAILY, Disp: 90 tablet, Rfl: 3  •  Lancets Ultra Thin misc, 1 (ONE) MISC DAILY, Disp: 100 each, Rfl: 3     Vitals:    06/08/21 1520   BP: 140/86   Pulse: 63   Temp: 97.1 °F (36.2 °C)   SpO2: 99%         06/08/21  1520   Weight: 82.1 kg (181 lb)         Physical Exam  Vitals and nursing note reviewed.   Constitutional:       General: He is not in acute distress.     Appearance: Normal appearance. He is well-developed.   HENT:      Head: Normocephalic and atraumatic.      Right Ear: External ear normal.      Left Ear: External ear normal.      Nose: Nose normal.   Eyes:      Extraocular Movements: Extraocular movements intact.      Conjunctiva/sclera: Conjunctivae normal.      Pupils: Pupils are equal, round, and reactive to light.   Cardiovascular:      Rate and Rhythm: Normal rate and regular rhythm.      Pulses: Normal pulses.      Heart sounds: Normal heart sounds.   Pulmonary:      Effort: Pulmonary effort is normal.      Breath sounds: Normal breath sounds.    Abdominal:      General: Bowel sounds are normal.      Palpations: Abdomen is soft.   Musculoskeletal:         General: Normal range of motion.      Cervical back: Normal range of motion and neck supple. No rigidity. No muscular tenderness.   Skin:     General: Skin is warm and dry.   Neurological:      General: No focal deficit present.      Mental Status: He is alert and oriented to person, place, and time.   Psychiatric:         Mood and Affect: Mood normal.         Behavior: Behavior normal.               Assessment/Plan   Diagnoses and all orders for this visit:    1. Sciatica of right side (Primary)  -     XR Spine Lumbar 4+ View; Future      Today I spent time examining patient particularly his right leg I showed him how to do some stretches I am giving him a back book to work on therapy at home.  We will go ahead and get some lumbar spine films and bring him back in about 10 days for follow-up.

## 2021-06-10 ENCOUNTER — TELEPHONE (OUTPATIENT)
Dept: INTERNAL MEDICINE | Facility: CLINIC | Age: 71
End: 2021-06-10

## 2021-06-10 NOTE — TELEPHONE ENCOUNTER
----- Message from Heath Flores MD sent at 6/9/2021  7:49 AM CDT -----  X-rays show anterior listhesis 4 on 5 which certainly could be causing some of his pain he also has spurs.  Anterior listhesis represents slippage of the vertebra.

## 2021-06-14 DIAGNOSIS — E11.9 DIABETES MELLITUS WITHOUT COMPLICATION (HCC): ICD-10-CM

## 2021-06-14 RX ORDER — PERPHENAZINE 16 MG/1
TABLET, FILM COATED ORAL
Qty: 100 EACH | Refills: 3 | Status: SHIPPED | OUTPATIENT
Start: 2021-06-14

## 2021-06-22 ENCOUNTER — OFFICE VISIT (OUTPATIENT)
Dept: INTERNAL MEDICINE | Facility: CLINIC | Age: 71
End: 2021-06-22

## 2021-06-22 VITALS
SYSTOLIC BLOOD PRESSURE: 120 MMHG | HEART RATE: 63 BPM | BODY MASS INDEX: 25.7 KG/M2 | DIASTOLIC BLOOD PRESSURE: 72 MMHG | TEMPERATURE: 97.5 F | HEIGHT: 71 IN | WEIGHT: 183.6 LBS | OXYGEN SATURATION: 98 %

## 2021-06-22 DIAGNOSIS — M43.10 ANTEROLISTHESIS: Primary | ICD-10-CM

## 2021-06-22 DIAGNOSIS — I10 BENIGN HYPERTENSION: ICD-10-CM

## 2021-06-22 PROCEDURE — 99213 OFFICE O/P EST LOW 20 MIN: CPT | Performed by: INTERNAL MEDICINE

## 2021-06-22 NOTE — PROGRESS NOTES
Subjective   Geoff Edwards is a 71 y.o. male.   Chief Complaint   Patient presents with   • Back Pain     10 day follow up, review xray.       History of Present Illness is a 10-day follow-up for patient with back pain he is improved he has been doing his stretching exercises few of the exercises bothered him so he has backed off of those.  Overall he is much improved.    The following portions of the patient's history were reviewed and updated as appropriate: allergies, current medications, past family history, past medical history, past social history, past surgical history and problem list.    Review of Systems   Constitutional: Negative for activity change, appetite change, fatigue, fever, unexpected weight gain and unexpected weight loss.   HENT: Negative for swollen glands, trouble swallowing and voice change.    Eyes: Negative for blurred vision and visual disturbance.   Respiratory: Negative for cough and shortness of breath.    Cardiovascular: Negative for chest pain, palpitations and leg swelling.   Gastrointestinal: Negative for abdominal pain, constipation, diarrhea, nausea, vomiting and indigestion.   Endocrine: Negative for cold intolerance, heat intolerance, polydipsia and polyphagia.   Genitourinary: Negative for dysuria and frequency.   Musculoskeletal: Positive for back pain. Negative for arthralgias, joint swelling and neck pain.   Skin: Negative for color change, rash and skin lesions.   Neurological: Negative for dizziness, weakness, headache, memory problem and confusion.   Hematological: Does not bruise/bleed easily.   Psychiatric/Behavioral: Negative for agitation, hallucinations and suicidal ideas. The patient is not nervous/anxious.        Objective   Past Medical History:   Diagnosis Date   • Abnormal PSA    • Diabetes mellitus (CMS/HCC)     diet controlled   • Hypertension    • PONV (postoperative nausea and vomiting)       Past Surgical History:   Procedure Laterality Date   •  APPENDECTOMY     • COLONOSCOPY  11/16/2012    Normal. Repeat in 5 years. Dr. Tez Perdomo   • COLONOSCOPY N/A 11/21/2017    Procedure: COLONOSCOPY WITH ANESTHESIA;  Surgeon: Tez Perdomo MD;  Location: [facility] ENDOSCOPY;  Service:    • MANDIBLE SURGERY     • PROSTATE BIOPSY     • PROSTATE BIOPSY N/A 3/19/2020    Procedure: PROSTATE ULTRASOUND BIOPSY MRI FUSION WITH URONAV;  Surgeon: Ric Tobin MD;  Location: [facility] OR;  Service: Urology;  Laterality: N/A;        Current Outpatient Medications:   •  Contour Next Test test strip, USE ONE EVERY DAY, Disp: 100 each, Rfl: 3  •  fluticasone (FLONASE) 50 MCG/ACT nasal spray, 2 sprays by Each Nare route 2 (Two) Times a Day., Disp: 16 g, Rfl: 3  •  ipratropium (ATROVENT) 0.06 % nasal spray, 2 sprays into the nostril(s) as directed by provider 4 (Four) Times a Day As Needed for Rhinitis. For drainage, Disp: 45 mL, Rfl: 3  •  irbesartan (AVAPRO) 300 MG tablet, TAKE 1 TABLET BY MOUTH DAILY, Disp: 90 tablet, Rfl: 3  •  Lancets Ultra Thin misc, 1 (ONE) MISC DAILY, Disp: 100 each, Rfl: 3     Vitals:    06/22/21 0811   BP: 120/72   Pulse: 63   Temp: 97.5 °F (36.4 °C)   SpO2: 98%         06/22/21  0811   Weight: 83.3 kg (183 lb 9.6 oz)         Physical Exam  Vitals and nursing note reviewed.   Constitutional:       General: He is not in acute distress.     Appearance: Normal appearance. He is well-developed.   HENT:      Head: Normocephalic and atraumatic.      Right Ear: External ear normal.      Left Ear: External ear normal.      Nose: Nose normal.   Eyes:      Extraocular Movements: Extraocular movements intact.      Conjunctiva/sclera: Conjunctivae normal.      Pupils: Pupils are equal, round, and reactive to light.   Cardiovascular:      Rate and Rhythm: Normal rate and regular rhythm.      Pulses: Normal pulses.      Heart sounds: Normal heart sounds.   Pulmonary:      Effort: Pulmonary effort is normal.      Breath sounds: Normal breath sounds.   Abdominal:       General: Bowel sounds are normal.      Palpations: Abdomen is soft.   Musculoskeletal:         General: Normal range of motion.      Cervical back: Normal range of motion and neck supple. No rigidity. No muscular tenderness.   Skin:     General: Skin is warm and dry.   Neurological:      General: No focal deficit present.      Mental Status: He is alert and oriented to person, place, and time.   Psychiatric:         Mood and Affect: Mood normal.         Behavior: Behavior normal.               Assessment/Plan   Diagnoses and all orders for this visit:    1. Anterolisthesis (Primary)    2. Benign hypertension          She has improved I think the anterior listhesis and I am seeing on plain x-rays of his spine is likely the culprit causing his pain he does have some spurs which is not unusual for someone his age encouraged him to continue to use the stretching exercises call me if he has any further problems.

## 2021-08-05 ENCOUNTER — APPOINTMENT (OUTPATIENT)
Dept: CARDIOLOGY | Facility: HOSPITAL | Age: 71
End: 2021-08-05

## 2021-08-05 ENCOUNTER — HOSPITAL ENCOUNTER (EMERGENCY)
Facility: HOSPITAL | Age: 71
Discharge: HOME | End: 2021-08-05
Attending: STUDENT IN AN ORGANIZED HEALTH CARE EDUCATION/TRAINING PROGRAM | Admitting: STUDENT IN AN ORGANIZED HEALTH CARE EDUCATION/TRAINING PROGRAM

## 2021-08-05 ENCOUNTER — TELEPHONE (OUTPATIENT)
Dept: INTERNAL MEDICINE | Facility: CLINIC | Age: 71
End: 2021-08-05

## 2021-08-05 ENCOUNTER — APPOINTMENT (OUTPATIENT)
Dept: GENERAL RADIOLOGY | Facility: HOSPITAL | Age: 71
End: 2021-08-05

## 2021-08-05 ENCOUNTER — HOSPITAL ENCOUNTER (EMERGENCY)
Facility: HOSPITAL | Age: 71
Discharge: HOME | End: 2021-08-05
Attending: EMERGENCY MEDICINE | Admitting: INTERNAL MEDICINE

## 2021-08-05 VITALS
DIASTOLIC BLOOD PRESSURE: 100 MMHG | HEIGHT: 71 IN | WEIGHT: 185.85 LBS | HEART RATE: 60 BPM | RESPIRATION RATE: 20 BRPM | OXYGEN SATURATION: 100 % | BODY MASS INDEX: 26.02 KG/M2 | TEMPERATURE: 98.4 F | SYSTOLIC BLOOD PRESSURE: 170 MMHG

## 2021-08-05 VITALS
OXYGEN SATURATION: 100 % | RESPIRATION RATE: 18 BRPM | DIASTOLIC BLOOD PRESSURE: 80 MMHG | BODY MASS INDEX: 25.76 KG/M2 | SYSTOLIC BLOOD PRESSURE: 133 MMHG | HEART RATE: 64 BPM | HEIGHT: 71 IN | WEIGHT: 184 LBS | TEMPERATURE: 98.6 F

## 2021-08-05 DIAGNOSIS — R33.8 POSTPROCEDURAL URINARY RETENTION: Primary | ICD-10-CM

## 2021-08-05 DIAGNOSIS — N99.89 POSTPROCEDURAL URINARY RETENTION: Primary | ICD-10-CM

## 2021-08-05 DIAGNOSIS — I20.0 UNSTABLE ANGINA (HCC): Primary | ICD-10-CM

## 2021-08-05 DIAGNOSIS — R07.9 CHEST PAIN, UNSPECIFIED TYPE: ICD-10-CM

## 2021-08-05 LAB
ALBUMIN SERPL-MCNC: 4.1 G/DL (ref 3.5–5.2)
ALBUMIN/GLOB SERPL: 1.7 G/DL
ALP SERPL-CCNC: 75 U/L (ref 39–117)
ALT SERPL W P-5'-P-CCNC: 17 U/L (ref 1–41)
ANION GAP SERPL CALCULATED.3IONS-SCNC: 10 MMOL/L (ref 5–15)
AST SERPL-CCNC: 19 U/L (ref 1–40)
BASOPHILS # BLD AUTO: 0.06 10*3/MM3 (ref 0–0.2)
BASOPHILS NFR BLD AUTO: 1.1 % (ref 0–1.5)
BH CV STRESS BP STAGE 1: NORMAL
BH CV STRESS BP STAGE 2: NORMAL
BH CV STRESS DURATION MIN STAGE 1: 3
BH CV STRESS DURATION MIN STAGE 2: 1
BH CV STRESS DURATION SEC STAGE 1: 0
BH CV STRESS DURATION SEC STAGE 2: 15
BH CV STRESS ECHO POST STRESS EJECTION FRACTION EF: 60 %
BH CV STRESS GRADE STAGE 1: 10
BH CV STRESS GRADE STAGE 2: 12
BH CV STRESS HR STAGE 1: 134
BH CV STRESS HR STAGE 2: 153
BH CV STRESS METS STAGE 1: 5
BH CV STRESS METS STAGE 2: 7.5
BH CV STRESS PROTOCOL 1: NORMAL
BH CV STRESS RECOVERY BP: NORMAL MMHG
BH CV STRESS RECOVERY HR: 71 BPM
BH CV STRESS SPEED STAGE 1: 1.7
BH CV STRESS SPEED STAGE 2: 2.5
BH CV STRESS STAGE 1: 1
BH CV STRESS STAGE 2: 2
BILIRUB SERPL-MCNC: 1.1 MG/DL (ref 0–1.2)
BUN SERPL-MCNC: 14 MG/DL (ref 8–23)
BUN/CREAT SERPL: 13.2 (ref 7–25)
CALCIUM SPEC-SCNC: 8.5 MG/DL (ref 8.6–10.5)
CHLORIDE SERPL-SCNC: 106 MMOL/L (ref 98–107)
CO2 SERPL-SCNC: 23 MMOL/L (ref 22–29)
CREAT SERPL-MCNC: 1.06 MG/DL (ref 0.76–1.27)
D DIMER PPP FEU-MCNC: 0.44 MG/L (FEU) (ref 0–0.5)
DEPRECATED RDW RBC AUTO: 45.3 FL (ref 37–54)
EOSINOPHIL # BLD AUTO: 0.21 10*3/MM3 (ref 0–0.4)
EOSINOPHIL NFR BLD AUTO: 3.8 % (ref 0.3–6.2)
ERYTHROCYTE [DISTWIDTH] IN BLOOD BY AUTOMATED COUNT: 13.4 % (ref 12.3–15.4)
GFR SERPL CREATININE-BSD FRML MDRD: 83 ML/MIN/1.73
GLOBULIN UR ELPH-MCNC: 2.4 GM/DL
GLUCOSE SERPL-MCNC: 191 MG/DL (ref 65–99)
HCT VFR BLD AUTO: 38.9 % (ref 37.5–51)
HGB BLD-MCNC: 13.5 G/DL (ref 13–17.7)
HOLD SPECIMEN: NORMAL
IMM GRANULOCYTES # BLD AUTO: 0.02 10*3/MM3 (ref 0–0.05)
IMM GRANULOCYTES NFR BLD AUTO: 0.4 % (ref 0–0.5)
INR PPP: 1.05 (ref 0.91–1.09)
LV EF 2D ECHO EST: 55 %
LYMPHOCYTES # BLD AUTO: 1.49 10*3/MM3 (ref 0.7–3.1)
LYMPHOCYTES NFR BLD AUTO: 26.7 % (ref 19.6–45.3)
MAXIMAL PREDICTED HEART RATE: 147 BPM
MCH RBC QN AUTO: 32 PG (ref 26.6–33)
MCHC RBC AUTO-ENTMCNC: 34.7 G/DL (ref 31.5–35.7)
MCV RBC AUTO: 92.2 FL (ref 79–97)
MONOCYTES # BLD AUTO: 0.33 10*3/MM3 (ref 0.1–0.9)
MONOCYTES NFR BLD AUTO: 5.9 % (ref 5–12)
NEUTROPHILS NFR BLD AUTO: 3.47 10*3/MM3 (ref 1.7–7)
NEUTROPHILS NFR BLD AUTO: 62.1 % (ref 42.7–76)
NRBC BLD AUTO-RTO: 0 /100 WBC (ref 0–0.2)
PERCENT MAX PREDICTED HR: 104.08 %
PLATELET # BLD AUTO: 151 10*3/MM3 (ref 140–450)
PMV BLD AUTO: 12.4 FL (ref 6–12)
POTASSIUM SERPL-SCNC: 3.9 MMOL/L (ref 3.5–5.2)
PROT SERPL-MCNC: 6.5 G/DL (ref 6–8.5)
PROTHROMBIN TIME: 12.9 SECONDS (ref 11.5–13.4)
RBC # BLD AUTO: 4.22 10*6/MM3 (ref 4.14–5.8)
SARS-COV-2 RNA PNL SPEC NAA+PROBE: NOT DETECTED
SODIUM SERPL-SCNC: 139 MMOL/L (ref 136–145)
STRESS BASELINE BP: NORMAL MMHG
STRESS BASELINE HR: 51 BPM
STRESS PERCENT HR: 122 %
STRESS POST ESTIMATED WORKLOAD: 7.5 METS
STRESS POST EXERCISE DUR MIN: 4 MIN
STRESS POST EXERCISE DUR SEC: 15 SEC
STRESS POST PEAK BP: NORMAL MMHG
STRESS POST PEAK HR: 153 BPM
STRESS TARGET HR: 125 BPM
TROPONIN T SERPL-MCNC: <0.01 NG/ML (ref 0–0.03)
TROPONIN T SERPL-MCNC: <0.01 NG/ML (ref 0–0.03)
WBC # BLD AUTO: 5.58 10*3/MM3 (ref 3.4–10.8)
WHOLE BLOOD HOLD SPECIMEN: NORMAL

## 2021-08-05 PROCEDURE — 93017 CV STRESS TEST TRACING ONLY: CPT

## 2021-08-05 PROCEDURE — 93005 ELECTROCARDIOGRAM TRACING: CPT | Performed by: EMERGENCY MEDICINE

## 2021-08-05 PROCEDURE — C1769 GUIDE WIRE: HCPCS | Performed by: INTERNAL MEDICINE

## 2021-08-05 PROCEDURE — 84484 ASSAY OF TROPONIN QUANT: CPT | Performed by: EMERGENCY MEDICINE

## 2021-08-05 PROCEDURE — 99152 MOD SED SAME PHYS/QHP 5/>YRS: CPT | Performed by: INTERNAL MEDICINE

## 2021-08-05 PROCEDURE — 85610 PROTHROMBIN TIME: CPT

## 2021-08-05 PROCEDURE — C1894 INTRO/SHEATH, NON-LASER: HCPCS | Performed by: INTERNAL MEDICINE

## 2021-08-05 PROCEDURE — 25010000002 MIDAZOLAM HCL (PF) 5 MG/5ML SOLUTION: Performed by: INTERNAL MEDICINE

## 2021-08-05 PROCEDURE — 51701 INSERT BLADDER CATHETER: CPT

## 2021-08-05 PROCEDURE — 93010 ELECTROCARDIOGRAM REPORT: CPT | Performed by: EMERGENCY MEDICINE

## 2021-08-05 PROCEDURE — 25010000002 ADENOSINE (DIAGNOSTIC) PER 30 MG: Performed by: INTERNAL MEDICINE

## 2021-08-05 PROCEDURE — C1887 CATHETER, GUIDING: HCPCS | Performed by: INTERNAL MEDICINE

## 2021-08-05 PROCEDURE — 71045 X-RAY EXAM CHEST 1 VIEW: CPT

## 2021-08-05 PROCEDURE — 93458 L HRT ARTERY/VENTRICLE ANGIO: CPT | Performed by: INTERNAL MEDICINE

## 2021-08-05 PROCEDURE — 87635 SARS-COV-2 COVID-19 AMP PRB: CPT | Performed by: EMERGENCY MEDICINE

## 2021-08-05 PROCEDURE — 36415 COLL VENOUS BLD VENIPUNCTURE: CPT

## 2021-08-05 PROCEDURE — 99285 EMERGENCY DEPT VISIT HI MDM: CPT | Performed by: INTERNAL MEDICINE

## 2021-08-05 PROCEDURE — 0 IOPAMIDOL PER 1 ML: Performed by: INTERNAL MEDICINE

## 2021-08-05 PROCEDURE — 93352 ADMIN ECG CONTRAST AGENT: CPT | Performed by: INTERNAL MEDICINE

## 2021-08-05 PROCEDURE — 25010000002 FENTANYL CITRATE (PF) 100 MCG/2ML SOLUTION: Performed by: INTERNAL MEDICINE

## 2021-08-05 PROCEDURE — 25010000002 DIPHENHYDRAMINE PER 50 MG: Performed by: INTERNAL MEDICINE

## 2021-08-05 PROCEDURE — 25010000002 PERFLUTREN 6.52 MG/ML SUSPENSION: Performed by: INTERNAL MEDICINE

## 2021-08-05 PROCEDURE — 99282 EMERGENCY DEPT VISIT SF MDM: CPT

## 2021-08-05 PROCEDURE — 85379 FIBRIN DEGRADATION QUANT: CPT | Performed by: EMERGENCY MEDICINE

## 2021-08-05 PROCEDURE — 93571 IV DOP VEL&/PRESS C FLO 1ST: CPT | Performed by: INTERNAL MEDICINE

## 2021-08-05 PROCEDURE — 25010000002 HEPARIN (PORCINE) 2000-0.9 UNIT/L-% SOLUTION: Performed by: INTERNAL MEDICINE

## 2021-08-05 PROCEDURE — 84484 ASSAY OF TROPONIN QUANT: CPT

## 2021-08-05 PROCEDURE — 80053 COMPREHEN METABOLIC PANEL: CPT

## 2021-08-05 PROCEDURE — 93018 CV STRESS TEST I&R ONLY: CPT | Performed by: INTERNAL MEDICINE

## 2021-08-05 PROCEDURE — C1760 CLOSURE DEV, VASC: HCPCS | Performed by: INTERNAL MEDICINE

## 2021-08-05 PROCEDURE — 93350 STRESS TTE ONLY: CPT | Performed by: INTERNAL MEDICINE

## 2021-08-05 PROCEDURE — C9803 HOPD COVID-19 SPEC COLLECT: HCPCS

## 2021-08-05 PROCEDURE — 99153 MOD SED SAME PHYS/QHP EA: CPT | Performed by: INTERNAL MEDICINE

## 2021-08-05 PROCEDURE — 85025 COMPLETE CBC W/AUTO DIFF WBC: CPT

## 2021-08-05 PROCEDURE — 99284 EMERGENCY DEPT VISIT MOD MDM: CPT

## 2021-08-05 PROCEDURE — 93350 STRESS TTE ONLY: CPT

## 2021-08-05 PROCEDURE — 25010000002 HEPARIN (PORCINE) 1000-0.9 UT/500ML-% SOLUTION: Performed by: INTERNAL MEDICINE

## 2021-08-05 RX ORDER — ROSUVASTATIN CALCIUM 5 MG/1
5 TABLET, COATED ORAL DAILY
Qty: 30 TABLET | Refills: 11 | Status: SHIPPED | OUTPATIENT
Start: 2021-08-05 | End: 2022-08-19

## 2021-08-05 RX ORDER — FENTANYL CITRATE 50 UG/ML
INJECTION, SOLUTION INTRAMUSCULAR; INTRAVENOUS AS NEEDED
Status: DISCONTINUED | OUTPATIENT
Start: 2021-08-05 | End: 2021-08-05 | Stop reason: HOSPADM

## 2021-08-05 RX ORDER — HEPARIN SODIUM 200 [USP'U]/100ML
INJECTION, SOLUTION INTRAVENOUS AS NEEDED
Status: DISCONTINUED | OUTPATIENT
Start: 2021-08-05 | End: 2021-08-05 | Stop reason: HOSPADM

## 2021-08-05 RX ORDER — CLONIDINE HYDROCHLORIDE 0.1 MG/1
0.1 TABLET ORAL ONCE
Status: COMPLETED | OUTPATIENT
Start: 2021-08-05 | End: 2021-08-05

## 2021-08-05 RX ORDER — SODIUM CHLORIDE 0.9 % (FLUSH) 0.9 %
10 SYRINGE (ML) INJECTION AS NEEDED
Status: DISCONTINUED | OUTPATIENT
Start: 2021-08-05 | End: 2021-08-05 | Stop reason: HOSPADM

## 2021-08-05 RX ORDER — MIDAZOLAM HYDROCHLORIDE 1 MG/ML
INJECTION, SOLUTION INTRAMUSCULAR; INTRAVENOUS AS NEEDED
Status: DISCONTINUED | OUTPATIENT
Start: 2021-08-05 | End: 2021-08-05 | Stop reason: HOSPADM

## 2021-08-05 RX ORDER — ASPIRIN 81 MG/1
324 TABLET, CHEWABLE ORAL ONCE
Status: COMPLETED | OUTPATIENT
Start: 2021-08-05 | End: 2021-08-05

## 2021-08-05 RX ORDER — AMLODIPINE BESYLATE 10 MG/1
10 TABLET ORAL ONCE
Status: COMPLETED | OUTPATIENT
Start: 2021-08-05 | End: 2021-08-05

## 2021-08-05 RX ORDER — AMLODIPINE BESYLATE 5 MG/1
5 TABLET ORAL DAILY
Qty: 30 TABLET | Refills: 11 | Status: SHIPPED | OUTPATIENT
Start: 2021-08-05 | End: 2022-08-05

## 2021-08-05 RX ORDER — CLONIDINE HYDROCHLORIDE 0.1 MG/1
0.1 TABLET ORAL 2 TIMES DAILY PRN
Qty: 60 TABLET | Refills: 11 | Status: SHIPPED | OUTPATIENT
Start: 2021-08-05

## 2021-08-05 RX ORDER — SODIUM CHLORIDE 9 MG/ML
100 INJECTION, SOLUTION INTRAVENOUS CONTINUOUS
Status: DISCONTINUED | OUTPATIENT
Start: 2021-08-05 | End: 2021-08-05 | Stop reason: HOSPADM

## 2021-08-05 RX ORDER — NITROGLYCERIN 0.4 MG/1
TABLET SUBLINGUAL
Qty: 25 TABLET | Refills: 11 | Status: SHIPPED | OUTPATIENT
Start: 2021-08-05 | End: 2023-01-19 | Stop reason: SDUPTHER

## 2021-08-05 RX ORDER — DIPHENHYDRAMINE HYDROCHLORIDE 50 MG/ML
INJECTION INTRAMUSCULAR; INTRAVENOUS AS NEEDED
Status: DISCONTINUED | OUTPATIENT
Start: 2021-08-05 | End: 2021-08-05 | Stop reason: HOSPADM

## 2021-08-05 RX ADMIN — PERFLUTREN 8.48 MG: 6.52 INJECTION, SUSPENSION INTRAVENOUS at 12:48

## 2021-08-05 RX ADMIN — CLONIDINE HYDROCHLORIDE 0.1 MG: 0.1 TABLET ORAL at 17:58

## 2021-08-05 RX ADMIN — AMLODIPINE BESYLATE 10 MG: 10 TABLET ORAL at 17:58

## 2021-08-05 RX ADMIN — ASPIRIN 324 MG: 81 TABLET, CHEWABLE ORAL at 11:40

## 2021-08-05 NOTE — TELEPHONE ENCOUNTER
"Pt called having \"slight\" chest pains.  Says they are not that bad not strong and wanted to come into the office.  Told pt he would have to go to the ER to be monitored as we did not have the equipment to do testing for heart issues.  Pt still did not want to go stating he didn't know what they would do and asked what kind of tests they might run.  I explained that I was not knowledgeable in the tests but he really needed to go and have it checked out.  Pt finally agreed.  "

## 2021-08-06 ENCOUNTER — HOSPITAL ENCOUNTER (EMERGENCY)
Facility: HOSPITAL | Age: 71
Discharge: HOME | End: 2021-08-06
Admitting: EMERGENCY MEDICINE

## 2021-08-06 ENCOUNTER — TELEPHONE (OUTPATIENT)
Dept: UROLOGY | Facility: CLINIC | Age: 71
End: 2021-08-06

## 2021-08-06 VITALS
DIASTOLIC BLOOD PRESSURE: 89 MMHG | WEIGHT: 180 LBS | HEART RATE: 71 BPM | SYSTOLIC BLOOD PRESSURE: 149 MMHG | BODY MASS INDEX: 25.2 KG/M2 | OXYGEN SATURATION: 98 % | HEIGHT: 71 IN | RESPIRATION RATE: 20 BRPM | TEMPERATURE: 98 F

## 2021-08-06 DIAGNOSIS — N99.89 POSTPROCEDURAL URINARY RETENTION: Primary | ICD-10-CM

## 2021-08-06 DIAGNOSIS — R33.8 POSTPROCEDURAL URINARY RETENTION: Primary | ICD-10-CM

## 2021-08-06 LAB
BACTERIA UR QL AUTO: ABNORMAL /HPF
BILIRUB UR QL STRIP: NEGATIVE
CLARITY UR: CLEAR
COLOR UR: YELLOW
GLUCOSE UR STRIP-MCNC: NEGATIVE MG/DL
HGB UR QL STRIP.AUTO: ABNORMAL
HYALINE CASTS UR QL AUTO: ABNORMAL /LPF
KETONES UR QL STRIP: NEGATIVE
LEUKOCYTE ESTERASE UR QL STRIP.AUTO: NEGATIVE
NITRITE UR QL STRIP: NEGATIVE
PH UR STRIP.AUTO: 6.5 [PH] (ref 5–8)
PROT UR QL STRIP: NEGATIVE
QT INTERVAL: 410 MS
QT INTERVAL: 428 MS
QTC INTERVAL: 394 MS
QTC INTERVAL: 395 MS
RBC # UR: ABNORMAL /HPF
REF LAB TEST METHOD: ABNORMAL
SP GR UR STRIP: >1.03 (ref 1–1.03)
SQUAMOUS #/AREA URNS HPF: ABNORMAL /HPF
UROBILINOGEN UR QL STRIP: ABNORMAL
WBC UR QL AUTO: ABNORMAL /HPF

## 2021-08-06 PROCEDURE — 99283 EMERGENCY DEPT VISIT LOW MDM: CPT

## 2021-08-06 PROCEDURE — 81001 URINALYSIS AUTO W/SCOPE: CPT | Performed by: NURSE PRACTITIONER

## 2021-08-06 PROCEDURE — 51798 US URINE CAPACITY MEASURE: CPT

## 2021-08-06 PROCEDURE — 51702 INSERT TEMP BLADDER CATH: CPT

## 2021-08-06 NOTE — ED PROVIDER NOTES
"Subjective   Patient is a 71-year-old black male presents the emergency department with urinary hesitancy urinary retention.  Patient was in the emergency department yesterday and had a heart catheterization for chest pain.  He states he had no stenting done yesterday.  He was discharged home.  He states he returned to the emergency department about 9:00 last night with urinary retention and had an in and out cath and his bladder was drained.  He states he left here about 11 last night and states he is just \"dribbled\" urine since then.  He states he does have a history with enlarged prostate and follows with Dr. Tobin.  He states he was having some trouble with that prior to this procedure.  He denies any nausea or vomiting.  No fever or chills.  No abdominal pain except for he states his bladder feels full.      History provided by:  Patient   used: No        Review of Systems   Constitutional: Negative.    HENT: Negative.    Eyes: Negative.    Respiratory: Negative.    Cardiovascular: Negative.    Gastrointestinal: Negative.    Endocrine: Negative.    Genitourinary:        Patient is a 71-year-old black male presents the emergency department with urinary hesitancy urinary retention.  Patient was in the emergency department yesterday and had a heart catheterization for chest pain.  He states he had no stenting done yesterday.  He was discharged home.  He states he returned to the emergency department about 9:00 last night with urinary retention and had an in and out cath and his bladder was drained.  He states he left here about 11 last night and states he is just \"dribbled\" urine since then.  He states he does have a history with enlarged prostate and follows with Dr. Tobin.  He states he was having some trouble with that prior to this procedure.  He denies any nausea or vomiting.  No fever or chills.  No abdominal pain except for he states his bladder feels full.     Musculoskeletal: Negative.  "   Skin: Negative.    Allergic/Immunologic: Negative.    Neurological: Negative.    Hematological: Negative.    Psychiatric/Behavioral: Negative.    All other systems reviewed and are negative.      Past Medical History:   Diagnosis Date   • Abnormal PSA    • Diabetes mellitus (CMS/HCC)     diet controlled   • Hypertension    • PONV (postoperative nausea and vomiting)        Allergies   Allergen Reactions   • Ace Inhibitors Cough       Past Surgical History:   Procedure Laterality Date   • APPENDECTOMY     • CARDIAC CATHETERIZATION     • CARDIAC CATHETERIZATION Left 8/5/2021    Procedure: Cardiac Catheterization/Vascular Study;  Surgeon: Luke Dixon MD;  Location: [facility] CATH INVASIVE LOCATION;  Service: Cardiology;  Laterality: Left;   • COLONOSCOPY  11/16/2012    Normal. Repeat in 5 years. Dr. Tez Perdomo   • COLONOSCOPY N/A 11/21/2017    Procedure: COLONOSCOPY WITH ANESTHESIA;  Surgeon: Tez Perdomo MD;  Location: [facility] ENDOSCOPY;  Service:    • MANDIBLE SURGERY     • PROSTATE BIOPSY     • PROSTATE BIOPSY N/A 3/19/2020    Procedure: PROSTATE ULTRASOUND BIOPSY MRI FUSION WITH URONAV;  Surgeon: Ric Tobin MD;  Location: [facility] OR;  Service: Urology;  Laterality: N/A;       Family History   Problem Relation Age of Onset   • Heart disease Father    • No Known Problems Mother    • No Known Problems Brother    • No Known Problems Brother    • No Known Problems Sister    • No Known Problems Sister    • No Known Problems Sister    • No Known Problems Sister    • Stomach cancer Sister    • Cancer Sister 69   • Colon cancer Neg Hx    • Colon polyps Neg Hx        Social History     Socioeconomic History   • Marital status:      Spouse name: Not on file   • Number of children: Not on file   • Years of education: Not on file   • Highest education level: Not on file   Tobacco Use   • Smoking status: Never Smoker   • Smokeless tobacco: Never Used   Vaping Use   • Vaping Use: Never used   Substance and  "Sexual Activity   • Alcohol use: No   • Drug use: No   • Sexual activity: Defer       Prior to Admission medications    Medication Sig Start Date End Date Taking? Authorizing Provider   amLODIPine (NORVASC) 5 MG tablet Take 1 tablet by mouth Daily. 8/5/21   Luke Dixon MD   cloNIDine (Catapres) 0.1 MG tablet Take 1 tablet by mouth 2 (Two) Times a Day As Needed for High Blood Pressure (> 150/90). 8/5/21   Luke Dixon MD   Contour Next Test test strip USE ONE EVERY DAY 6/14/21   Shazia Randle APRN   fluticasone (FLONASE) 50 MCG/ACT nasal spray 2 sprays by Each Nare route 2 (Two) Times a Day. 12/9/20   Fernando Solano Jr., MD   ipratropium (ATROVENT) 0.06 % nasal spray 2 sprays into the nostril(s) as directed by provider 4 (Four) Times a Day As Needed for Rhinitis. For drainage 3/9/21   Fernando Solano Jr., MD   irbesartan (AVAPRO) 300 MG tablet TAKE 1 TABLET BY MOUTH DAILY 11/9/20   Heath Flores MD   Lancets Ultra Thin misc 1 (ONE) MISC DAILY 1/25/21   Heath Flores MD   nitroglycerin (NITROSTAT) 0.4 MG SL tablet 1 under the tongue as needed for angina, may repeat q5mins for up three doses 8/5/21   Luke Dixon MD   rosuvastatin (CRESTOR) 5 MG tablet Take 1 tablet by mouth Daily. 8/5/21   Luke Dixon MD       /89 (BP Location: Right arm, Patient Position: Sitting)   Pulse 71   Temp 98 °F (36.7 °C) (Oral)   Resp 20   Ht 180.3 cm (71\")   Wt 81.6 kg (180 lb)   SpO2 98%   BMI 25.10 kg/m²     Objective   Physical Exam  Vitals and nursing note reviewed.   Constitutional:       Appearance: He is well-developed.      Comments: No acute distress. Non toxic appearing   HENT:      Head: Normocephalic and atraumatic.   Eyes:      Conjunctiva/sclera: Conjunctivae normal.      Pupils: Pupils are equal, round, and reactive to light.   Cardiovascular:      Rate and Rhythm: Normal rate and regular rhythm.      Heart sounds: Normal heart sounds.   Pulmonary:      Effort: Pulmonary " effort is normal.      Breath sounds: Normal breath sounds.   Abdominal:      General: Bowel sounds are normal.      Palpations: Abdomen is soft.      Comments: Bladder is distended.    Musculoskeletal:         General: Normal range of motion.      Cervical back: Normal range of motion and neck supple.   Skin:     General: Skin is warm and dry.   Neurological:      Mental Status: He is alert and oriented to person, place, and time.      Deep Tendon Reflexes: Reflexes are normal and symmetric.   Psychiatric:         Behavior: Behavior normal.         Thought Content: Thought content normal.         Judgment: Judgment normal.         Procedures         Lab Results (last 24 hours)     Procedure Component Value Units Date/Time    Troponin [[id number]]  (Normal) Collected: 08/05/21 1143    Specimen: Blood Updated: 08/05/21 1219     Troponin T <0.010 ng/mL     Narrative:      Troponin T Reference Range:  <= 0.03 ng/mL-   Negative for AMI  >0.03 ng/mL-     Abnormal for myocardial necrosis.  Clinicians would have to utilize clinical acumen, EKG, Troponin and serial changes to determine if it is an Acute Myocardial Infarction or myocardial injury due to an underlying chronic condition.       Results may be falsely decreased if patient taking Biotin.      COVID-19,[facility] IN-HOUSE,Nasal Swab No Transport Media 3-4 HR TAT - Swab, Nasal Cavity [[id number]]  (Normal) Collected: 08/05/21 1414    Specimen: Swab from Nasal Cavity Updated: 08/05/21 1452     COVID19 Not Detected    Narrative:      Fact sheet for providers: https://www.fda.gov/media/603277/download     Fact sheet for patients: https://www.fda.gov/media/915550/download    Test performed by PCR.    Consider negative results in combination with clinical observations, patient history, and epidemiological information.    Urinalysis With Culture If Indicated - Urine, Catheter [[id number]]  (Abnormal) Collected: 08/06/21 0835    Specimen: Urine, Catheter Updated: 08/06/21 0906      Color, UA Yellow     Appearance, UA Clear     pH, UA 6.5     Specific Gravity, UA >1.030     Glucose, UA Negative     Ketones, UA Negative     Bilirubin, UA Negative     Blood, UA Small (1+)     Protein, UA Negative     Leuk Esterase, UA Negative     Nitrite, UA Negative     Urobilinogen, UA 1.0 E.U./dL    Urinalysis, Microscopic Only - Urine, Catheter [[id number]]  (Abnormal) Collected: 08/06/21 0835    Specimen: Urine, Catheter Updated: 08/06/21 0906     RBC, UA 13-20 /HPF      WBC, UA 0-2 /HPF      Bacteria, UA None Seen /HPF      Squamous Epithelial Cells, UA None Seen /HPF      Hyaline Casts, UA None Seen /LPF      Methodology Automated Microscopy          No orders to display       ED Course  ED Course as of Aug 06 1007   Fri Aug 06, 2021   0916 Patient states he is feeling much better.  There was greater than 999 mils of urine in the bladder.  We will leave the catheter in and let him follow-up with Dr. Tobin.  He does follow with Dr. Tobin for other issues as well.  He will follow up with him next week.  Patient be discharged home shortly in stable condition.    [CW]      ED Course User Index  [CW] Kamala Rodriguez APRN          [facility]  Number of Diagnoses or Management Options  Postprocedural urinary retention: minor     Amount and/or Complexity of Data Reviewed  Clinical lab tests: ordered and reviewed    Patient Progress  Patient progress: stable      Final diagnoses:   Postprocedural urinary retention          Kamala Rodriguez APRN  08/06/21 1007

## 2021-08-09 ENCOUNTER — TELEPHONE (OUTPATIENT)
Dept: INTERNAL MEDICINE | Facility: CLINIC | Age: 71
End: 2021-08-09

## 2021-08-09 NOTE — TELEPHONE ENCOUNTER
Caller: Geoff Edwards    Relationship: Self    Best call back number: [phone]      What is the best time to reach you:    ANYTIME    Who are you requesting to speak with     QUESTION ABOUT MEDICATION FROM ER VISIT. DOES NOT WANT TO BE SEEN JUST TO SPEAK TO CLINICAL ABOUT THEM.    Do you know the name of the person who called:       Do you require a callback.      YES

## 2021-08-10 ENCOUNTER — TELEPHONE (OUTPATIENT)
Dept: INTERNAL MEDICINE | Facility: CLINIC | Age: 71
End: 2021-08-10

## 2021-08-10 NOTE — TELEPHONE ENCOUNTER
Heart Cath last Thursday  Cardiology prescribed clonidine 0.1mg tablet 1 tablet 2x per day as needed.    Pt is wondering if Dr. Flores is in agreement with him taking this med.

## 2021-08-10 NOTE — TELEPHONE ENCOUNTER
We have a new message today, where patient called back with more information, which has been forwarded to Dr. Flores.

## 2021-08-10 NOTE — PROGRESS NOTES
Subjective    Mr. Edwards is 71 y.o. male    Chief Complaint:  Urinary retention    History of Present Illness  Elevated PSA  Patient is here with an elevated PSA. The PSA was drawn1 week. He does not have a family history of prostate cancer. His AUA Symptom Score is 6 /35. Voiding symptoms include Incomplete emptying,  Weakened stream and Nocturia. Denies Urgency and Straining. Voiding symptoms began several years  . These have been gradual in onset. negative--10/12 for PSA of 7 & PSA 8.5 3/19 negative MRI/Fusion biopsy  The following portions of the patient's history were reviewed and updated as appropriate: allergies, current medications, past family history, past medical history, past social history, past surgical history and problem list.    Review of Systems   Constitutional: Negative for chills and fever.   HENT: Negative.    Eyes: Negative.    Respiratory: Negative for shortness of breath.    Cardiovascular: Negative for chest pain.   Gastrointestinal: Negative for abdominal pain, anal bleeding, blood in stool, nausea and vomiting.   Endocrine: Negative.    Genitourinary: Positive for difficulty urinating. Negative for dysuria, flank pain, frequency, hematuria and urgency.   Musculoskeletal: Negative.    Skin: Negative.    Allergic/Immunologic: Negative.    Neurological: Negative.    Hematological: Negative.    Psychiatric/Behavioral: Negative.          Current Outpatient Medications:   •  amLODIPine (NORVASC) 5 MG tablet, Take 1 tablet by mouth Daily., Disp: 30 tablet, Rfl: 11  •  cloNIDine (Catapres) 0.1 MG tablet, Take 1 tablet by mouth 2 (Two) Times a Day As Needed for High Blood Pressure (> 150/90)., Disp: 60 tablet, Rfl: 11  •  Contour Next Test test strip, USE ONE EVERY DAY, Disp: 100 each, Rfl: 3  •  fluticasone (FLONASE) 50 MCG/ACT nasal spray, 2 sprays by Each Nare route 2 (Two) Times a Day., Disp: 16 g, Rfl: 3  •  ipratropium (ATROVENT) 0.06 % nasal spray, 2 sprays into the nostril(s) as  directed by provider 4 (Four) Times a Day As Needed for Rhinitis. For drainage, Disp: 45 mL, Rfl: 3  •  irbesartan (AVAPRO) 300 MG tablet, TAKE 1 TABLET BY MOUTH DAILY, Disp: 90 tablet, Rfl: 3  •  Lancets Ultra Thin misc, 1 (ONE) MISC DAILY, Disp: 100 each, Rfl: 3  •  nitroglycerin (NITROSTAT) 0.4 MG SL tablet, 1 under the tongue as needed for angina, may repeat q5mins for up three doses, Disp: 25 tablet, Rfl: 11  •  rosuvastatin (CRESTOR) 5 MG tablet, Take 1 tablet by mouth Daily., Disp: 30 tablet, Rfl: 11    Past Medical History:   Diagnosis Date   • Abnormal PSA    • Diabetes mellitus (CMS/HCC)     diet controlled   • Hypertension    • PONV (postoperative nausea and vomiting)        Past Surgical History:   Procedure Laterality Date   • APPENDECTOMY     • CARDIAC CATHETERIZATION     • CARDIAC CATHETERIZATION Left 8/5/2021    Procedure: Cardiac Catheterization/Vascular Study;  Surgeon: Luke Dixon MD;  Location: [facility] CATH INVASIVE LOCATION;  Service: Cardiology;  Laterality: Left;   • COLONOSCOPY  11/16/2012    Normal. Repeat in 5 years. Dr. Tez Perdomo   • COLONOSCOPY N/A 11/21/2017    Procedure: COLONOSCOPY WITH ANESTHESIA;  Surgeon: Tez Perdomo MD;  Location: [facility] ENDOSCOPY;  Service:    • MANDIBLE SURGERY     • PROSTATE BIOPSY     • PROSTATE BIOPSY N/A 3/19/2020    Procedure: PROSTATE ULTRASOUND BIOPSY MRI FUSION WITH URONAV;  Surgeon: Ric Tobin MD;  Location: [facility] OR;  Service: Urology;  Laterality: N/A;       Social History     Socioeconomic History   • Marital status:      Spouse name: Not on file   • Number of children: Not on file   • Years of education: Not on file   • Highest education level: Not on file   Tobacco Use   • Smoking status: Never Smoker   • Smokeless tobacco: Never Used   Vaping Use   • Vaping Use: Never used   Substance and Sexual Activity   • Alcohol use: No   • Drug use: No   • Sexual activity: Defer       Family History   Problem Relation Age of Onset  "  • Heart disease Father    • No Known Problems Mother    • No Known Problems Brother    • No Known Problems Brother    • No Known Problems Sister    • No Known Problems Sister    • No Known Problems Sister    • No Known Problems Sister    • Stomach cancer Sister    • Cancer Sister 69   • Colon cancer Neg Hx    • Colon polyps Neg Hx        Objective    Temp 97.6 °F (36.4 °C)   Ht 180.3 cm (71\")   Wt 81.6 kg (180 lb)   BMI 25.10 kg/m²     Physical Exam        Results for orders placed or performed during the hospital encounter of 08/06/21   Urinalysis With Culture If Indicated - Urine, Catheter    Specimen: Urine, Catheter   Result Value Ref Range    Color, UA Yellow Yellow, Straw    Appearance, UA Clear Clear    pH, UA 6.5 5.0 - 8.0    Specific Gravity, UA >1.030 (H) 1.005 - 1.030    Glucose, UA Negative Negative    Ketones, UA Negative Negative    Bilirubin, UA Negative Negative    Blood, UA Small (1+) (A) Negative    Protein, UA Negative Negative    Leuk Esterase, UA Negative Negative    Nitrite, UA Negative Negative    Urobilinogen, UA 1.0 E.U./dL 0.2 - 1.0 E.U./dL   Urinalysis, Microscopic Only - Urine, Catheter    Specimen: Urine, Catheter   Result Value Ref Range    RBC, UA 13-20 (A) None Seen /HPF    WBC, UA 0-2 (A) None Seen /HPF    Bacteria, UA None Seen None Seen /HPF    Squamous Epithelial Cells, UA None Seen None Seen, 0-2 /HPF    Hyaline Casts, UA None Seen None Seen /LPF    Methodology Automated Microscopy      Assessment and Plan    Diagnoses and all orders for this visit:    1. Urinary retention (Primary)    2. BPH with urinary obstruction  -     PSA, Total & Free; Future    3. Elevated prostate specific antigen (PSA)  -     PSA, Total & Free; Future      Patient underwent a biopsy in 2012 for PSA of 7.   He had a 4K score of 4 in 2017 which indicates a very low risk prostate cancer.          MRI for up PSA of 8.5 showed 45cc gland with 1 cm PIRADS 4 lesion in right mid peripheral zone.   He " underwent an MRI fusion biopsy in March 2020 which was negative for cancer.     At last follow up he had a a PSA of 7.1 with a percent free of 20%.     I reviewed notes indicate he developed urinary retention.  He had a catheter placed and 800 cc of urine returned.  He is on Flomax.    Fill and pull today.  Patient was able to void adequately.  The catheter was left out.  He will follow-up in March with PSA.

## 2021-08-12 NOTE — ED NOTES
"ED Call Back Questions    1. How are you doing since leaving the Emergency Department?  Doing pretty well    2. Do you have any questions about your discharge instructions? Yes     a. If medications prescribed:     Do you have any questions about those medications? Yes Do I take my BP med twice a day now?  Yes the CLonidine is 2x.  Can I get my catheter wet in the shower?  Yes you can clean it with soap and water.    3. We are always looking to get better at what we do. Do you have any suggestions for what we can do to be even better? No   a. If Yes, \"Thank you for sharing your concerns. I apologize. I will follow up with our manager and patient . Would you like someone to call you back?\" N/A    4. Is there anything else I can do for you? No        Lashawn Villarreal, RN  08/12/21 1042    "

## 2021-08-13 ENCOUNTER — OFFICE VISIT (OUTPATIENT)
Dept: UROLOGY | Facility: CLINIC | Age: 71
End: 2021-08-13

## 2021-08-13 VITALS — WEIGHT: 180 LBS | TEMPERATURE: 97.6 F | BODY MASS INDEX: 25.2 KG/M2 | HEIGHT: 71 IN

## 2021-08-13 DIAGNOSIS — R97.20 ELEVATED PROSTATE SPECIFIC ANTIGEN (PSA): ICD-10-CM

## 2021-08-13 DIAGNOSIS — N40.1 BPH WITH URINARY OBSTRUCTION: ICD-10-CM

## 2021-08-13 DIAGNOSIS — R33.9 URINARY RETENTION: Primary | ICD-10-CM

## 2021-08-13 DIAGNOSIS — N13.8 BPH WITH URINARY OBSTRUCTION: ICD-10-CM

## 2021-08-13 PROCEDURE — 99214 OFFICE O/P EST MOD 30 MIN: CPT | Performed by: UROLOGY

## 2021-08-13 PROCEDURE — 51798 US URINE CAPACITY MEASURE: CPT | Performed by: UROLOGY

## 2021-08-13 NOTE — PROGRESS NOTES
Patient of Dr. Tobin states he is here today to have his catheter removed. The patient denies any fever, chills or  N&V. Using the catheter in place and sterile water, 120 cc was instilled into the bladder with no complications. Patient was able to void 60 cc. A Bladder scan was then performed which showed 21 cc in the bladder.   Patient advised to call the office with any questions or concerns. The patient verbalized understanding. Dr. Tobin was in the office at the time of procedure.      Patient was advised to drink clear fluids for the next couple hours and urinate. he was advised he may experience some blood in the urine and burning with urination for the next couple days. If the patient is unable to urinate or develops fever, chills, N&V or suprapubic pain he will call to return for an appt at clinic or seek medical treatment at [facility] ER, PCP or Urgent Care after hours. Patient verbalized understanding and all questions were answered.           Niya Kim   Medical Assistant documentation is reviewed.  Agree with management as above.

## 2021-08-15 ENCOUNTER — HOSPITAL ENCOUNTER (EMERGENCY)
Facility: HOSPITAL | Age: 71
Discharge: HOME | End: 2021-08-15
Admitting: EMERGENCY MEDICINE

## 2021-08-15 VITALS
HEART RATE: 65 BPM | DIASTOLIC BLOOD PRESSURE: 84 MMHG | RESPIRATION RATE: 18 BRPM | WEIGHT: 184 LBS | BODY MASS INDEX: 25.76 KG/M2 | OXYGEN SATURATION: 100 % | HEIGHT: 71 IN | SYSTOLIC BLOOD PRESSURE: 154 MMHG | TEMPERATURE: 98.1 F

## 2021-08-15 DIAGNOSIS — R33.9 URINARY RETENTION: Primary | ICD-10-CM

## 2021-08-15 DIAGNOSIS — Z87.438 HISTORY OF BPH: ICD-10-CM

## 2021-08-15 LAB
BILIRUB UR QL STRIP: NEGATIVE
CLARITY UR: CLEAR
COLOR UR: YELLOW
GLUCOSE UR STRIP-MCNC: NEGATIVE MG/DL
HGB UR QL STRIP.AUTO: NEGATIVE
KETONES UR QL STRIP: NEGATIVE
LEUKOCYTE ESTERASE UR QL STRIP.AUTO: NEGATIVE
NITRITE UR QL STRIP: NEGATIVE
PH UR STRIP.AUTO: 6 [PH] (ref 5–8)
PROT UR QL STRIP: NEGATIVE
SP GR UR STRIP: 1.01 (ref 1–1.03)
UROBILINOGEN UR QL STRIP: NORMAL

## 2021-08-15 PROCEDURE — 99283 EMERGENCY DEPT VISIT LOW MDM: CPT

## 2021-08-15 PROCEDURE — 81003 URINALYSIS AUTO W/O SCOPE: CPT | Performed by: NURSE PRACTITIONER

## 2021-08-15 PROCEDURE — 51798 US URINE CAPACITY MEASURE: CPT

## 2021-08-15 PROCEDURE — 51702 INSERT TEMP BLADDER CATH: CPT

## 2021-08-15 NOTE — ED PROVIDER NOTES
Subjective   Patient is a 71-year-old male presents emergency department with chief complaints of urinary retention.  Patient had a recent heart catheterization performed by Dr. Dixon on August 5, 2021.  He apparently developed postprocedural urinary retention and was seen in the emergency department the following day and had a Galdamez catheter placed.  Patient was reevaluated on August 13 by Dr. Tobin with urology for Galdamez catheter removal.  Patient had work-up which revealed an elevated PSA.  Dr. Tobin's note mentions that he would need repeat follow-up with him in March for repeat PSA.  He was diagnosed with urinary retention, BPH, elevated prostate PSA.  Galdamez catheter was removed in his office.  Patient reports having difficulty with urinating and returns for urinary retention.  He denies any known hematuria.  He has had no fevers.  Patient denies any nausea or vomiting.          Review of Systems   Constitutional: Negative.  Negative for fever.   HENT: Negative.  Negative for congestion.    Eyes: Negative.    Respiratory: Negative.  Negative for cough and shortness of breath.    Cardiovascular: Negative.  Negative for chest pain.   Gastrointestinal: Positive for abdominal pain. Negative for diarrhea, nausea and vomiting.   Genitourinary: Positive for decreased urine volume and difficulty urinating.   Musculoskeletal: Negative.  Negative for back pain.   Skin: Negative.    All other systems reviewed and are negative.      Past Medical History:   Diagnosis Date   • Abnormal PSA    • Diabetes mellitus (CMS/HCC)     diet controlled   • Hypertension    • PONV (postoperative nausea and vomiting)        Allergies   Allergen Reactions   • Ace Inhibitors Cough       Past Surgical History:   Procedure Laterality Date   • APPENDECTOMY     • CARDIAC CATHETERIZATION     • CARDIAC CATHETERIZATION Left 8/5/2021    Procedure: Cardiac Catheterization/Vascular Study;  Surgeon: Luke Dixon MD;  Location:  [facility] CATH INVASIVE  LOCATION;  Service: Cardiology;  Laterality: Left;   • COLONOSCOPY  11/16/2012    Normal. Repeat in 5 years. Dr. Tez Perdomo   • COLONOSCOPY N/A 11/21/2017    Procedure: COLONOSCOPY WITH ANESTHESIA;  Surgeon: Tez Perdomo MD;  Location: [facility] ENDOSCOPY;  Service:    • MANDIBLE SURGERY     • PROSTATE BIOPSY     • PROSTATE BIOPSY N/A 3/19/2020    Procedure: PROSTATE ULTRASOUND BIOPSY MRI FUSION WITH URONAV;  Surgeon: Ric Tobin MD;  Location: [facility] OR;  Service: Urology;  Laterality: N/A;       Family History   Problem Relation Age of Onset   • Heart disease Father    • No Known Problems Mother    • No Known Problems Brother    • No Known Problems Brother    • No Known Problems Sister    • No Known Problems Sister    • No Known Problems Sister    • No Known Problems Sister    • Stomach cancer Sister    • Cancer Sister 69   • Colon cancer Neg Hx    • Colon polyps Neg Hx        Social History     Socioeconomic History   • Marital status:      Spouse name: Not on file   • Number of children: Not on file   • Years of education: Not on file   • Highest education level: Not on file   Tobacco Use   • Smoking status: Never Smoker   • Smokeless tobacco: Never Used   Vaping Use   • Vaping Use: Never used   Substance and Sexual Activity   • Alcohol use: No   • Drug use: No   • Sexual activity: Defer           Objective   Physical Exam  Vitals and nursing note reviewed.   Constitutional:       General: He is not in acute distress.     Appearance: He is well-developed. He is not diaphoretic.   HENT:      Head: Atraumatic.      Right Ear: External ear normal.      Left Ear: External ear normal.      Nose: Nose normal.      Mouth/Throat:      Mouth: Mucous membranes are moist.      Pharynx: Oropharynx is clear.   Eyes:      General: No scleral icterus.     Extraocular Movements: Extraocular movements intact.      Conjunctiva/sclera: Conjunctivae normal.      Pupils: Pupils are equal, round, and reactive to  light.   Neck:      Thyroid: No thyromegaly.      Vascular: No JVD.   Cardiovascular:      Rate and Rhythm: Normal rate and regular rhythm.      Heart sounds: Normal heart sounds. No murmur heard.     Pulmonary:      Effort: Pulmonary effort is normal. No respiratory distress.      Breath sounds: Normal breath sounds. No wheezing or rales.   Chest:      Chest wall: No tenderness.   Abdominal:      General: Bowel sounds are normal. There is no distension.      Palpations: There is no mass.      Tenderness: There is no abdominal tenderness. There is no guarding or rebound.      Comments: Mild pain to palpation in the suprapubic region with bowel sounds identified.   Musculoskeletal:         General: Normal range of motion.      Cervical back: Normal range of motion and neck supple.   Lymphadenopathy:      Cervical: No cervical adenopathy.   Skin:     General: Skin is warm and dry.      Capillary Refill: Capillary refill takes less than 2 seconds.      Coloration: Skin is not pale.      Findings: No erythema or rash.   Neurological:      General: No focal deficit present.      Mental Status: He is alert and oriented to person, place, and time.      Cranial Nerves: No cranial nerve deficit.      Coordination: Coordination normal.      Deep Tendon Reflexes: Reflexes are normal and symmetric.   Psychiatric:         Behavior: Behavior normal.         Thought Content: Thought content normal.         Judgment: Judgment normal.         Procedures           ED Course  ED Course as of Aug 15 1711   Sun Aug 15, 2021   1613 Patient had greater than 700 noted on bladder scan.  Galdamez catheter was placed by nursing staff.  Urinalysis is negative.    [TW]   1613 She will need to follow-up with Dr. Tobin.  We will provide a leg bag and he will be discharged home shortly in stable condition.    [TW]      ED Course User Index  [TW] Tami Barton APRN                                           MDM  Number of Diagnoses or Management  Options  History of BPH: new and requires workup  Urinary retention: new and requires workup     Amount and/or Complexity of Data Reviewed  Clinical lab tests: ordered and reviewed  Decide to obtain previous medical records or to obtain history from someone other than the patient: yes  Discuss the patient with other providers: yes    Risk of Complications, Morbidity, and/or Mortality  Presenting problems: low  Diagnostic procedures: low  Management options: low    Patient Progress  Patient progress: improved      Final diagnoses:   Urinary retention   History of BPH       ED Disposition  ED Disposition     ED Disposition Condition Comment    Discharge Good           No follow-up provider specified.       Medication List      No changes were made to your prescriptions during this visit.          Tami Barton, APRN  08/15/21 171

## 2021-08-16 ENCOUNTER — TELEPHONE (OUTPATIENT)
Dept: UROLOGY | Facility: CLINIC | Age: 71
End: 2021-08-16

## 2021-08-16 NOTE — TELEPHONE ENCOUNTER
Patient called in saying he was in the ER this weekend for retention. He was advised by ER to call us to make an appt to get the catheter taken out. Dr. Tobin advised that the patient come in next week for a cysto. I notified patient of this and scheduled him 08/27 at 09:50. Patient verbalized understanding and all questions were answered.

## 2021-08-17 PROBLEM — I25.10 NON-OCCLUSIVE CORONARY ARTERY DISEASE: Status: ACTIVE | Noted: 2021-08-17

## 2021-08-17 PROBLEM — E78.5 HYPERLIPIDEMIA LDL GOAL <70: Status: ACTIVE | Noted: 2021-08-17

## 2021-08-17 NOTE — PROGRESS NOTES
Chief Complaint  Chest Pain (2 WK FU S/P CATH )    Subjective          Geoff Edwards presents to [facility] for routine follow-up of hospital discharge on 8/5/2021 following left heart catheterization, which revealed 30 to 40% stenosis of the mid left anterior descending coronary artery with normal FFR.  He has a history of hypertension, hyperlipidemia and diet controlled diabetes mellitus.  Patient denies chest pain, shortness of breath, palpitations, dizziness, syncope, orthopnea, PND, edema or decreased stamina.  Patient denies any signs of bleeding.    Coronary Artery Disease  Presents for follow-up visit. Pertinent negatives include no chest pain, chest pressure, chest tightness, dizziness, leg swelling, muscle weakness, palpitations, shortness of breath or weight gain. Risk factors include hyperlipidemia. The symptoms have been stable. Compliance with diet is variable. Compliance with exercise is variable. Compliance with medications is good.   Hypertension  This is a chronic problem. The current episode started more than 1 year ago. The problem is controlled. Pertinent negatives include no anxiety, blurred vision, chest pain, headaches, malaise/fatigue, neck pain, orthopnea, palpitations, peripheral edema, PND, shortness of breath or sweats. Risk factors for coronary artery disease include male gender and diabetes mellitus. Current antihypertension treatment includes calcium channel blockers, central alpha agonists and angiotensin blockers. The current treatment provides significant improvement.   Hyperlipidemia  This is a new problem. Exacerbating diseases include diabetes. Pertinent negatives include no chest pain or shortness of breath. Current antihyperlipidemic treatment includes statins. Risk factors for coronary artery disease include hypertension, male sex, dyslipidemia and diabetes mellitus.       Objective   Vital Signs:   /82   Pulse 66   Ht 180.3 cm  "(70.98\")   Wt 83.5 kg (184 lb)   SpO2 97%   BMI 25.67 kg/m²     Vitals and nursing note reviewed.   Constitutional:       General: Awake.      Appearance: Normal and healthy appearance. Well-developed, normal weight and not in distress.   Eyes:      General: Lids are normal.      Conjunctiva/sclera: Conjunctivae normal.      Pupils: Pupils are equal, round, and reactive to light.   HENT:      Head: Normocephalic and atraumatic.      Nose: Nose normal.   Neck:      Vascular: No JVR. JVD normal.   Pulmonary:      Effort: Pulmonary effort is normal.      Breath sounds: Normal breath sounds. No wheezing. No rhonchi. No rales.   Chest:      Chest wall: Not tender to palpatation.   Cardiovascular:      PMI at left midclavicular line. Normal rate. Regular rhythm. Normal S1. Normal S2.      Murmurs: There is no murmur.      No gallop. No click. No rub.   Pulses:     Intact distal pulses.   Edema:     Peripheral edema absent.   Abdominal:      General: Bowel sounds are normal.      Palpations: Abdomen is soft.      Tenderness: There is no abdominal tenderness.   Musculoskeletal: Normal range of motion.         General: No tenderness.      Cervical back: Normal range of motion. Skin:     General: Skin is warm and dry.   Neurological:      General: No focal deficit present.      Mental Status: Alert, oriented to person, place, and time and oriented to person, place and time.   Psychiatric:         Attention and Perception: Attention and perception normal.         Mood and Affect: Mood and affect normal.         Speech: Speech normal.         Behavior: Behavior normal. Behavior is cooperative.         Thought Content: Thought content normal.         Cognition and Memory: Cognition and memory normal.         Judgment: Judgment normal.        Result Review :   The following data was reviewed by: MARIA DOLORES Kimball on 08/18/2021:  Common labs    Common Labsle 3/17/21 3/18/21 3/18/21 3/18/21 3/18/21 3/18/21 3/18/21 8/5/21 " 8/5/21     0854 [id number] 0854 0854 0922 0951 0951   Glucose         191 (A)   Glucose  99          BUN  14       14   Creatinine  1.22       1.06   eGFR Non African Am  58 (A)          eGFR  Am  71       83   Sodium  142       139   Potassium  4.1       3.9   Chloride  107       106   Calcium  9.3       8.5 (A)   Total Protein  6.7          Albumin  4.20       4.10   Total Bilirubin  1.2       1.1   Alkaline Phosphatase  81       75   AST (SGOT)  23       19   ALT (SGPT)  22       17   WBC   6.54     5.58    Hemoglobin   13.9     13.5    Hematocrit   40.3     38.9    Platelets   155     151    Total Cholesterol    161        Triglycerides    71        HDL Cholesterol    63 (A)        LDL Cholesterol     84        Hemoglobin A1C       5.7     Microalbumin, Urine      4.1      PSA 7.1 (A)           Uric Acid     3.5       (A) Abnormal value       Comments are available for some flowsheets but are not being displayed.           Data reviewed: Cardiology studies stress echo and left heart catheterization 8/5/21          Assessment and Plan    Diagnoses and all orders for this visit:    1. Non-occlusive coronary artery disease (Primary)- 30 to 40% stenosis of the mid left anterior descending coronary artery with normal FFR on left heart catheterization 8/5/21. Start aspirin 81 mg daily.     2. Benign hypertension-blood pressures well controlled.  Continue irbesartan, amlodipine and as needed clonidine.  Monitor and record daily blood pressure. Report readings consistently higher than 140/90 or consistently lower than 100/60.     3. Hyperlipidemia LDL goal <70-management per PCP.  Continue Crestor. Pt will need recheck of lipid panel around 11/5/21.     4. Diet-controlled diabetes mellitus (CMS/HCC)-management per PCP.        Follow Up   Return in about 6 months (around 2/18/2022) for Next scheduled follow up with Dr. Dixon.  Patient was given instructions and counseling regarding his condition or for health  maintenance advice. Please see specific information pulled into the AVS if appropriate.

## 2021-08-18 ENCOUNTER — OFFICE VISIT (OUTPATIENT)
Dept: CARDIOLOGY | Facility: CLINIC | Age: 71
End: 2021-08-18

## 2021-08-18 VITALS
HEIGHT: 71 IN | BODY MASS INDEX: 25.76 KG/M2 | OXYGEN SATURATION: 97 % | WEIGHT: 184 LBS | HEART RATE: 66 BPM | DIASTOLIC BLOOD PRESSURE: 82 MMHG | SYSTOLIC BLOOD PRESSURE: 120 MMHG

## 2021-08-18 DIAGNOSIS — I25.10 NON-OCCLUSIVE CORONARY ARTERY DISEASE: Primary | ICD-10-CM

## 2021-08-18 DIAGNOSIS — I10 BENIGN HYPERTENSION: ICD-10-CM

## 2021-08-18 DIAGNOSIS — E11.9 DIET-CONTROLLED DIABETES MELLITUS (HCC): ICD-10-CM

## 2021-08-18 DIAGNOSIS — E78.5 HYPERLIPIDEMIA LDL GOAL <70: ICD-10-CM

## 2021-08-18 PROCEDURE — 99214 OFFICE O/P EST MOD 30 MIN: CPT | Performed by: NURSE PRACTITIONER

## 2021-08-18 RX ORDER — ASPIRIN 81 MG/1
81 TABLET ORAL DAILY
Qty: 30 TABLET | Refills: 11 | Status: SHIPPED | OUTPATIENT
Start: 2021-08-18

## 2021-08-19 ENCOUNTER — NURSE TRIAGE (OUTPATIENT)
Dept: CALL CENTER | Facility: HOSPITAL | Age: 71
End: 2021-08-19

## 2021-08-19 DIAGNOSIS — R33.9 URINARY RETENTION: Primary | ICD-10-CM

## 2021-08-19 RX ORDER — OXYBUTYNIN CHLORIDE 5 MG/1
5 TABLET ORAL DAILY
Qty: 30 TABLET | Refills: 0 | Status: SHIPPED | OUTPATIENT
Start: 2021-08-19 | End: 2021-08-27

## 2021-08-19 NOTE — ED NOTES
"ED Call Back Questions    1. How are you doing since leaving the Emergency Department?  Doing so so, have an appointment with Urology    2. Do you have any questions about your discharge instructions? No     a. If medications prescribed:     Do you have any questions about those medications? No     3. We are always looking to get better at what we do. Do you have any suggestions for what we can do to be even better? No   a. If Yes, \"Thank you for sharing your concerns. I apologize. I will follow up with our manager and patient . Would you like someone to call you back?\" No     4. Is there anything else I can do for you? No  visit was good .        Fernando Chacon LPN  08/19/21 [id number]    "

## 2021-08-19 NOTE — TELEPHONE ENCOUNTER
"    Reason for Disposition  • Leakage of urine around catheter    Additional Information  • Negative: Shock suspected (e.g., cold/pale/clammy skin, too weak to stand, low BP, rapid pulse)  • Negative: Sounds like a life-threatening emergency to the triager  • Negative: [1] Catheter was accidentally pulled-out AND [2] bright red continuous bleeding  • Negative: SEVERE abdominal pain  • Negative: Fever > 100.4 F (38.0 C)  • Negative: [1] Drinking very little AND [2] dehydration suspected (e.g., no urine > 12 hours, very dry mouth, very lightheaded)  • Negative: Patient sounds very sick or weak to the triager  • Negative: Catheter was accidentally pulled-out  • Negative: [1] Catheter is broken AND [2] is not usable  • Negative: Bleeding around catheter (e.g., from penis or female urethra)  • Negative: Lower abdominal pain or distention  • Negative: [1] No urine in bag > 4 hours AND [2] catheter is not kinked  • Negative: [1] Tea-colored or slightly red urine lasts > 24 hours AND [2] not cleared by increasing fluid intake    AND [3] no recent prostate or bladder surgery  • Negative: [1] Cloudy urine lasts > 24 hours AND [2] not cleared by increasing fluid intake  • Negative: [1] Bloody or red-colored urine AND [2] no recent prostate or bladder surgery  (Exception: brief episode and urine now clear)  • Negative: [1] Bloody or red-colored urine AND [2] prostate or bladder surgery > 3 days (72 hours) ago  • Negative: Urine smells bad  • Negative: [1] Catheter is broken or cracked AND [2] is still usable  • Negative: Tea-colored or red-tinged urine, present < 24 hours  • Negative: Cloudy urine, present < 24 hours  • Negative: [1] Prostate surgery or other urologic surgery < 3 days (72 hours) ago AND [2] blood present in urine  • Negative: No urine in bag < 4 hours  • Negative: Urinary catheter care, questions about    Answer Assessment - Initial Assessment Questions  1. SYMPTOMS: \"What symptoms are you concerned about?\"     " " leakage  2. ONSET:  \"When did the symptoms start?\"      Just prior to call  3. FEVER: \"Is there a fever?\" If Yes, ask: \"What is the temperature, how was it measured, and when did it start?\"      na  4. ABDOMINAL PAIN: \"Is there any abdominal pain?\" (e.g., Scale 1-10; or mild, moderate, severe)      na  5. URINE COLOR: \"What color is the urine?\"  \"Is there blood present in the urine?\" (e.g., clear, yellow, cloudy, tea-colored, blood streaks, bright red)      normal  6. ONSET: \"When was the catheter inserted?\"      Sunday  7. OTHER SYMPTOMS: \"Do you have any other symptoms?\" (e.g., back pain, bad urine odor)       Maybe spasms  8. PREGNANCY: \"Is there any chance you are pregnant?\" \"When was your last menstrual period?\"      na    Protocols used: URINARY CATHETER SYMPTOMS AND QUESTIONS-ADULT-      "

## 2021-08-23 DIAGNOSIS — N32.89 BLADDER SPASMS: Primary | ICD-10-CM

## 2021-08-23 RX ORDER — SOLIFENACIN SUCCINATE 10 MG/1
10 TABLET, FILM COATED ORAL DAILY
Qty: 30 TABLET | Refills: 2 | Status: ON HOLD | OUTPATIENT
Start: 2021-08-23 | End: 2021-08-30

## 2021-08-27 ENCOUNTER — LAB (OUTPATIENT)
Dept: LAB | Facility: HOSPITAL | Age: 71
End: 2021-08-27

## 2021-08-27 ENCOUNTER — PRE-ADMISSION TESTING (OUTPATIENT)
Dept: PREADMISSION TESTING | Facility: HOSPITAL | Age: 71
End: 2021-08-27

## 2021-08-27 ENCOUNTER — TRANSCRIBE ORDERS (OUTPATIENT)
Dept: LAB | Facility: HOSPITAL | Age: 71
End: 2021-08-27

## 2021-08-27 ENCOUNTER — PROCEDURE VISIT (OUTPATIENT)
Dept: UROLOGY | Facility: CLINIC | Age: 71
End: 2021-08-27

## 2021-08-27 VITALS
HEIGHT: 67 IN | BODY MASS INDEX: 27.61 KG/M2 | OXYGEN SATURATION: 99 % | WEIGHT: 175.93 LBS | DIASTOLIC BLOOD PRESSURE: 75 MMHG | SYSTOLIC BLOOD PRESSURE: 128 MMHG | HEART RATE: 76 BPM | RESPIRATION RATE: 16 BRPM

## 2021-08-27 DIAGNOSIS — Z01.818 PREOP TESTING: Primary | ICD-10-CM

## 2021-08-27 DIAGNOSIS — R33.9 URINARY RETENTION: Primary | ICD-10-CM

## 2021-08-27 DIAGNOSIS — R97.20 ELEVATED PROSTATE SPECIFIC ANTIGEN (PSA): ICD-10-CM

## 2021-08-27 DIAGNOSIS — N40.1 BPH WITH URINARY OBSTRUCTION: ICD-10-CM

## 2021-08-27 DIAGNOSIS — N13.8 BPH WITH URINARY OBSTRUCTION: ICD-10-CM

## 2021-08-27 LAB
ANION GAP SERPL CALCULATED.3IONS-SCNC: 10 MMOL/L (ref 5–15)
BUN SERPL-MCNC: 14 MG/DL (ref 8–23)
BUN/CREAT SERPL: 11.1 (ref 7–25)
CALCIUM SPEC-SCNC: 9.1 MG/DL (ref 8.6–10.5)
CHLORIDE SERPL-SCNC: 103 MMOL/L (ref 98–107)
CO2 SERPL-SCNC: 26 MMOL/L (ref 22–29)
CREAT SERPL-MCNC: 1.26 MG/DL (ref 0.76–1.27)
DEPRECATED RDW RBC AUTO: 44.6 FL (ref 37–54)
ERYTHROCYTE [DISTWIDTH] IN BLOOD BY AUTOMATED COUNT: 12.7 % (ref 12.3–15.4)
GFR SERPL CREATININE-BSD FRML MDRD: 68 ML/MIN/1.73
GLUCOSE SERPL-MCNC: 110 MG/DL (ref 65–99)
HCT VFR BLD AUTO: 37.7 % (ref 37.5–51)
HGB BLD-MCNC: 12.2 G/DL (ref 13–17.7)
MCH RBC QN AUTO: 30.7 PG (ref 26.6–33)
MCHC RBC AUTO-ENTMCNC: 32.4 G/DL (ref 31.5–35.7)
MCV RBC AUTO: 95 FL (ref 79–97)
PLATELET # BLD AUTO: 230 10*3/MM3 (ref 140–450)
PMV BLD AUTO: 11.9 FL (ref 6–12)
POTASSIUM SERPL-SCNC: 4.3 MMOL/L (ref 3.5–5.2)
RBC # BLD AUTO: 3.97 10*6/MM3 (ref 4.14–5.8)
SARS-COV-2 ORF1AB RESP QL NAA+PROBE: NOT DETECTED
SODIUM SERPL-SCNC: 139 MMOL/L (ref 136–145)
WBC # BLD AUTO: 9.64 10*3/MM3 (ref 3.4–10.8)

## 2021-08-27 PROCEDURE — 36415 COLL VENOUS BLD VENIPUNCTURE: CPT

## 2021-08-27 PROCEDURE — 85027 COMPLETE CBC AUTOMATED: CPT

## 2021-08-27 PROCEDURE — U0005 INFEC AGEN DETEC AMPLI PROBE: HCPCS | Performed by: UROLOGY

## 2021-08-27 PROCEDURE — 99214 OFFICE O/P EST MOD 30 MIN: CPT | Performed by: UROLOGY

## 2021-08-27 PROCEDURE — 80048 BASIC METABOLIC PNL TOTAL CA: CPT

## 2021-08-27 PROCEDURE — 84153 ASSAY OF PSA TOTAL: CPT

## 2021-08-27 PROCEDURE — 52000 CYSTOURETHROSCOPY: CPT | Performed by: UROLOGY

## 2021-08-27 PROCEDURE — C9803 HOPD COVID-19 SPEC COLLECT: HCPCS | Performed by: UROLOGY

## 2021-08-27 PROCEDURE — U0004 COV-19 TEST NON-CDC HGH THRU: HCPCS | Performed by: UROLOGY

## 2021-08-27 PROCEDURE — 84154 ASSAY OF PSA FREE: CPT

## 2021-08-27 RX ORDER — SODIUM CHLORIDE 9 MG/ML
100 INJECTION, SOLUTION INTRAVENOUS CONTINUOUS
Status: CANCELLED | OUTPATIENT
Start: 2021-08-27

## 2021-08-27 NOTE — PROGRESS NOTES
Subjective    Mr. Edwards is 71 y.o. male    Chief Complaint: Retention    History of Present Illness  Elevated PSA  Patient is here with an elevated PSA. The PSA was drawn1 week. He does not have a family history of prostate cancer. His AUA Symptom Score is 6 /35. Voiding symptoms include Incomplete emptying,  Weakened stream and Nocturia. Denies Urgency and Straining. Voiding symptoms began several years  . These have been gradual in onset. negative--10/12 for PSA of 7 & PSA 8.5 3/19 negative MRI/Fusion biopsy    Patient developed urinary retention.     The following portions of the patient's history were reviewed and updated as appropriate: allergies, current medications, past family history, past medical history, past social history, past surgical history and problem list.    Review of Systems   Constitutional: Negative for chills and fever.   Gastrointestinal: Negative for abdominal pain, anal bleeding and blood in stool.   Genitourinary: Positive for difficulty urinating. Negative for dysuria and hematuria.         Current Outpatient Medications:   •  amLODIPine (NORVASC) 5 MG tablet, Take 1 tablet by mouth Daily., Disp: 30 tablet, Rfl: 11  •  aspirin (aspirin) 81 MG EC tablet, Take 1 tablet by mouth Daily., Disp: 30 tablet, Rfl: 11  •  cloNIDine (Catapres) 0.1 MG tablet, Take 1 tablet by mouth 2 (Two) Times a Day As Needed for High Blood Pressure (> 150/90)., Disp: 60 tablet, Rfl: 11  •  Contour Next Test test strip, USE ONE EVERY DAY, Disp: 100 each, Rfl: 3  •  fluticasone (FLONASE) 50 MCG/ACT nasal spray, 2 sprays by Each Nare route 2 (Two) Times a Day., Disp: 16 g, Rfl: 3  •  ipratropium (ATROVENT) 0.06 % nasal spray, 2 sprays into the nostril(s) as directed by provider 4 (Four) Times a Day As Needed for Rhinitis. For drainage, Disp: 45 mL, Rfl: 3  •  irbesartan (AVAPRO) 300 MG tablet, TAKE 1 TABLET BY MOUTH DAILY, Disp: 90 tablet, Rfl: 3  •  Lancets Ultra Thin misc, 1 (ONE) MISC DAILY, Disp: 100 each,  Rfl: 3  •  nitroglycerin (NITROSTAT) 0.4 MG SL tablet, 1 under the tongue as needed for angina, may repeat q5mins for up three doses, Disp: 25 tablet, Rfl: 11  •  oxybutynin (DITROPAN) 5 MG tablet, Take 1 tablet by mouth Daily for 30 days., Disp: 30 tablet, Rfl: 0  •  rosuvastatin (CRESTOR) 5 MG tablet, Take 1 tablet by mouth Daily., Disp: 30 tablet, Rfl: 11  •  solifenacin (VESICARE) 10 MG tablet, Take 1 tablet by mouth Daily., Disp: 30 tablet, Rfl: 2    Past Medical History:   Diagnosis Date   • Abnormal PSA    • Diabetes mellitus (CMS/HCC)     diet controlled   • Hypertension    • PONV (postoperative nausea and vomiting)        Past Surgical History:   Procedure Laterality Date   • APPENDECTOMY     • CARDIAC CATHETERIZATION     • CARDIAC CATHETERIZATION Left 8/5/2021    Procedure: Cardiac Catheterization/Vascular Study;  Surgeon: Luke Dixon MD;  Location: [facility] CATH INVASIVE LOCATION;  Service: Cardiology;  Laterality: Left;   • COLONOSCOPY  11/16/2012    Normal. Repeat in 5 years. Dr. Tez Perdomo   • COLONOSCOPY N/A 11/21/2017    Procedure: COLONOSCOPY WITH ANESTHESIA;  Surgeon: Tez Perdomo MD;  Location: [facility] ENDOSCOPY;  Service:    • MANDIBLE SURGERY     • PROSTATE BIOPSY     • PROSTATE BIOPSY N/A 3/19/2020    Procedure: PROSTATE ULTRASOUND BIOPSY MRI FUSION WITH URONAV;  Surgeon: Ric Tobin MD;  Location: [facility] OR;  Service: Urology;  Laterality: N/A;       Social History     Socioeconomic History   • Marital status:      Spouse name: Not on file   • Number of children: Not on file   • Years of education: Not on file   • Highest education level: Not on file   Tobacco Use   • Smoking status: Never Smoker   • Smokeless tobacco: Never Used   Vaping Use   • Vaping Use: Never used   Substance and Sexual Activity   • Alcohol use: No   • Drug use: No   • Sexual activity: Defer       Family History   Problem Relation Age of Onset   • Heart disease Father    • No Known Problems Mother     • No Known Problems Brother    • No Known Problems Brother    • No Known Problems Sister    • No Known Problems Sister    • No Known Problems Sister    • No Known Problems Sister    • Stomach cancer Sister    • Cancer Sister 69   • Colon cancer Neg Hx    • Colon polyps Neg Hx        Objective    There were no vitals taken for this visit.    Physical Exam        Results for orders placed or performed during the hospital encounter of 08/15/21   Urinalysis With Culture If Indicated - Urine, Catheter    Specimen: Urine, Catheter   Result Value Ref Range    Color, UA Yellow Yellow, Straw    Appearance, UA Clear Clear    pH, UA 6.0 5.0 - 8.0    Specific Gravity, UA 1.015 1.005 - 1.030    Glucose, UA Negative Negative    Ketones, UA Negative Negative    Bilirubin, UA Negative Negative    Blood, UA Negative Negative    Protein, UA Negative Negative    Leuk Esterase, UA Negative Negative    Nitrite, UA Negative Negative    Urobilinogen, UA 1.0 E.U./dL 0.2 - 1.0 E.U./dL     Assessment and Plan    Diagnoses and all orders for this visit:    1. Urinary retention (Primary)  -     Case Request; Standing  -     sodium chloride 0.9 % infusion  -     ceFAZolin (ANCEF) 2 g in sodium chloride 0.9 % 100 mL IVPB  -     Case Request    Other orders  -     Follow Anesthesia Guidelines / Standing Orders; Future  -     Obtain informed consent  -     Provide NPO Instructions to Patient; Future  -     Chlorhexidine Skin Prep; Future  -     Follow Anesthesia Guidelines / Standing Orders; Standing  -     Verify NPO Status; Standing  -     SCD (sequential compression device)- to be placed on patient in Pre-op; Standing  -     Verify / Perform Chlorhexidine Skin Prep; Standing  -     Verify / Perform Chlorhexidine Skin Prep if Indicated (If Not Already Completed); Standing              Pre- operative diagnosis:  Urinary retention    Post operative diagnosis:  Same    Procedure:  The patient was prepped and draped in a normal sterile fashion.   The urethra was anesthetized with 2% lidocaine jelly.  A flexible cystoscope was introduced per urethra.      Urethra:  Normal    Bladder:  Bladder edema    Ureteral orifices:  Normal position bilaterally and Clear efflux bilaterally    Prostate:  lateral lobe hypertrophy    Patient tolerated the procedure well    Complications: none    Blood loss: minimal    Follow up:    Schedule for OR  TURP    Patient has failed 2 voiding trials.  Cystoscopy today showed lateral lobe hypertrophy of the prostate.  We will plan for transurethral resection of his prostate near future.  Discussion of this resulted in significant evaluation management in addition to the cystoscopy performed today.

## 2021-08-30 ENCOUNTER — ANESTHESIA (OUTPATIENT)
Dept: PERIOP | Facility: HOSPITAL | Age: 71
End: 2021-08-30

## 2021-08-30 ENCOUNTER — HOSPITAL ENCOUNTER (OUTPATIENT)
Facility: HOSPITAL | Age: 71
Discharge: HOME | End: 2021-08-31
Attending: UROLOGY | Admitting: UROLOGY

## 2021-08-30 ENCOUNTER — ANESTHESIA EVENT (OUTPATIENT)
Dept: PERIOP | Facility: HOSPITAL | Age: 71
End: 2021-08-30

## 2021-08-30 DIAGNOSIS — R33.9 URINARY RETENTION: ICD-10-CM

## 2021-08-30 LAB
BASOPHILS # BLD AUTO: 0.06 10*3/MM3 (ref 0–0.2)
BASOPHILS NFR BLD AUTO: 0.6 % (ref 0–1.5)
DEPRECATED RDW RBC AUTO: 44.2 FL (ref 37–54)
EOSINOPHIL # BLD AUTO: 0.04 10*3/MM3 (ref 0–0.4)
EOSINOPHIL NFR BLD AUTO: 0.4 % (ref 0.3–6.2)
ERYTHROCYTE [DISTWIDTH] IN BLOOD BY AUTOMATED COUNT: 12.6 % (ref 12.3–15.4)
GLUCOSE BLDC GLUCOMTR-MCNC: 113 MG/DL (ref 70–130)
GLUCOSE BLDC GLUCOMTR-MCNC: 118 MG/DL (ref 70–130)
HCT VFR BLD AUTO: 41.4 % (ref 37.5–51)
HGB BLD-MCNC: 13.8 G/DL (ref 13–17.7)
IMM GRANULOCYTES # BLD AUTO: 0.04 10*3/MM3 (ref 0–0.05)
IMM GRANULOCYTES NFR BLD AUTO: 0.4 % (ref 0–0.5)
LYMPHOCYTES # BLD AUTO: 1.26 10*3/MM3 (ref 0.7–3.1)
LYMPHOCYTES NFR BLD AUTO: 12.2 % (ref 19.6–45.3)
MCH RBC QN AUTO: 31.7 PG (ref 26.6–33)
MCHC RBC AUTO-ENTMCNC: 33.3 G/DL (ref 31.5–35.7)
MCV RBC AUTO: 95 FL (ref 79–97)
MONOCYTES # BLD AUTO: 0.2 10*3/MM3 (ref 0.1–0.9)
MONOCYTES NFR BLD AUTO: 1.9 % (ref 5–12)
NEUTROPHILS NFR BLD AUTO: 8.74 10*3/MM3 (ref 1.7–7)
NEUTROPHILS NFR BLD AUTO: 84.5 % (ref 42.7–76)
NRBC BLD AUTO-RTO: 0 /100 WBC (ref 0–0.2)
PLATELET # BLD AUTO: 196 10*3/MM3 (ref 140–450)
PMV BLD AUTO: 11.9 FL (ref 6–12)
RBC # BLD AUTO: 4.36 10*6/MM3 (ref 4.14–5.8)
WBC # BLD AUTO: 10.34 10*3/MM3 (ref 3.4–10.8)

## 2021-08-30 PROCEDURE — 88305 TISSUE EXAM BY PATHOLOGIST: CPT | Performed by: UROLOGY

## 2021-08-30 PROCEDURE — 25010000002 FENTANYL CITRATE (PF) 100 MCG/2ML SOLUTION: Performed by: NURSE ANESTHETIST, CERTIFIED REGISTERED

## 2021-08-30 PROCEDURE — 82962 GLUCOSE BLOOD TEST: CPT

## 2021-08-30 PROCEDURE — 85025 COMPLETE CBC W/AUTO DIFF WBC: CPT | Performed by: UROLOGY

## 2021-08-30 PROCEDURE — 25010000002 ONDANSETRON PER 1 MG: Performed by: NURSE ANESTHETIST, CERTIFIED REGISTERED

## 2021-08-30 PROCEDURE — 25010000002 PROPOFOL 10 MG/ML EMULSION: Performed by: NURSE ANESTHETIST, CERTIFIED REGISTERED

## 2021-08-30 PROCEDURE — 25010000002 METOCLOPRAMIDE PER 10 MG: Performed by: ANESTHESIOLOGY

## 2021-08-30 PROCEDURE — 52601 PROSTATECTOMY (TURP): CPT | Performed by: UROLOGY

## 2021-08-30 PROCEDURE — 94799 UNLISTED PULMONARY SVC/PX: CPT

## 2021-08-30 PROCEDURE — 25010000002 DEXAMETHASONE PER 1 MG: Performed by: ANESTHESIOLOGY

## 2021-08-30 PROCEDURE — 25010000002 CEFAZOLIN PER 500 MG: Performed by: UROLOGY

## 2021-08-30 RX ORDER — SODIUM CHLORIDE 0.9 % (FLUSH) 0.9 %
3 SYRINGE (ML) INJECTION EVERY 12 HOURS SCHEDULED
Status: DISCONTINUED | OUTPATIENT
Start: 2021-08-30 | End: 2021-08-30 | Stop reason: HOSPADM

## 2021-08-30 RX ORDER — LIDOCAINE HYDROCHLORIDE 10 MG/ML
0.5 INJECTION, SOLUTION EPIDURAL; INFILTRATION; INTRACAUDAL; PERINEURAL ONCE AS NEEDED
Status: DISCONTINUED | OUTPATIENT
Start: 2021-08-30 | End: 2021-08-30 | Stop reason: HOSPADM

## 2021-08-30 RX ORDER — SODIUM CHLORIDE, SODIUM LACTATE, POTASSIUM CHLORIDE, CALCIUM CHLORIDE 600; 310; 30; 20 MG/100ML; MG/100ML; MG/100ML; MG/100ML
1000 INJECTION, SOLUTION INTRAVENOUS CONTINUOUS
Status: DISCONTINUED | OUTPATIENT
Start: 2021-08-30 | End: 2021-08-30 | Stop reason: HOSPADM

## 2021-08-30 RX ORDER — MAGNESIUM HYDROXIDE 1200 MG/15ML
LIQUID ORAL AS NEEDED
Status: DISCONTINUED | OUTPATIENT
Start: 2021-08-30 | End: 2021-08-30 | Stop reason: HOSPADM

## 2021-08-30 RX ORDER — OXYCODONE AND ACETAMINOPHEN 10; 325 MG/1; MG/1
1 TABLET ORAL ONCE AS NEEDED
Status: DISCONTINUED | OUTPATIENT
Start: 2021-08-30 | End: 2021-08-30 | Stop reason: HOSPADM

## 2021-08-30 RX ORDER — HYDROCODONE BITARTRATE AND ACETAMINOPHEN 7.5; 325 MG/1; MG/1
1 TABLET ORAL EVERY 4 HOURS PRN
Status: DISCONTINUED | OUTPATIENT
Start: 2021-08-30 | End: 2021-08-31 | Stop reason: HOSPADM

## 2021-08-30 RX ORDER — ONDANSETRON 4 MG/1
4 TABLET, FILM COATED ORAL EVERY 6 HOURS PRN
Status: DISCONTINUED | OUTPATIENT
Start: 2021-08-30 | End: 2021-08-31 | Stop reason: HOSPADM

## 2021-08-30 RX ORDER — FENTANYL CITRATE 50 UG/ML
INJECTION, SOLUTION INTRAMUSCULAR; INTRAVENOUS AS NEEDED
Status: DISCONTINUED | OUTPATIENT
Start: 2021-08-30 | End: 2021-08-30 | Stop reason: SURG

## 2021-08-30 RX ORDER — SODIUM CHLORIDE, SODIUM LACTATE, POTASSIUM CHLORIDE, CALCIUM CHLORIDE 600; 310; 30; 20 MG/100ML; MG/100ML; MG/100ML; MG/100ML
100 INJECTION, SOLUTION INTRAVENOUS CONTINUOUS
Status: DISCONTINUED | OUTPATIENT
Start: 2021-08-30 | End: 2021-08-30 | Stop reason: HOSPADM

## 2021-08-30 RX ORDER — FENTANYL CITRATE 50 UG/ML
25 INJECTION, SOLUTION INTRAMUSCULAR; INTRAVENOUS
Status: DISCONTINUED | OUTPATIENT
Start: 2021-08-30 | End: 2021-08-30 | Stop reason: HOSPADM

## 2021-08-30 RX ORDER — BUPIVACAINE HCL/0.9 % NACL/PF 0.1 %
2 PLASTIC BAG, INJECTION (ML) EPIDURAL ONCE
Status: COMPLETED | OUTPATIENT
Start: 2021-08-30 | End: 2021-08-30

## 2021-08-30 RX ORDER — ONDANSETRON 2 MG/ML
INJECTION INTRAMUSCULAR; INTRAVENOUS AS NEEDED
Status: DISCONTINUED | OUTPATIENT
Start: 2021-08-30 | End: 2021-08-30 | Stop reason: SURG

## 2021-08-30 RX ORDER — ACETAMINOPHEN 500 MG
1000 TABLET ORAL ONCE
Status: COMPLETED | OUTPATIENT
Start: 2021-08-30 | End: 2021-08-30

## 2021-08-30 RX ORDER — MIDAZOLAM HYDROCHLORIDE 1 MG/ML
0.5 INJECTION INTRAMUSCULAR; INTRAVENOUS
Status: DISCONTINUED | OUTPATIENT
Start: 2021-08-30 | End: 2021-08-30 | Stop reason: HOSPADM

## 2021-08-30 RX ORDER — FLUMAZENIL 0.1 MG/ML
0.2 INJECTION INTRAVENOUS AS NEEDED
Status: DISCONTINUED | OUTPATIENT
Start: 2021-08-30 | End: 2021-08-30 | Stop reason: HOSPADM

## 2021-08-30 RX ORDER — SODIUM CHLORIDE 9 MG/ML
100 INJECTION, SOLUTION INTRAVENOUS CONTINUOUS
Status: DISCONTINUED | OUTPATIENT
Start: 2021-08-30 | End: 2021-08-30 | Stop reason: HOSPADM

## 2021-08-30 RX ORDER — ATROPA BELLADONNA AND OPIUM 16.2; 6 MG/1; MG/1
30 SUPPOSITORY RECTAL DAILY PRN
Status: DISCONTINUED | OUTPATIENT
Start: 2021-08-30 | End: 2021-08-31 | Stop reason: HOSPADM

## 2021-08-30 RX ORDER — DEXAMETHASONE SODIUM PHOSPHATE 4 MG/ML
4 INJECTION, SOLUTION INTRA-ARTICULAR; INTRALESIONAL; INTRAMUSCULAR; INTRAVENOUS; SOFT TISSUE ONCE AS NEEDED
Status: COMPLETED | OUTPATIENT
Start: 2021-08-30 | End: 2021-08-30

## 2021-08-30 RX ORDER — METOCLOPRAMIDE HYDROCHLORIDE 5 MG/ML
5 INJECTION INTRAMUSCULAR; INTRAVENOUS ONCE AS NEEDED
Status: COMPLETED | OUTPATIENT
Start: 2021-08-30 | End: 2021-08-30

## 2021-08-30 RX ORDER — SODIUM CHLORIDE 0.9 % (FLUSH) 0.9 %
3 SYRINGE (ML) INJECTION AS NEEDED
Status: DISCONTINUED | OUTPATIENT
Start: 2021-08-30 | End: 2021-08-30 | Stop reason: HOSPADM

## 2021-08-30 RX ORDER — CLONIDINE HYDROCHLORIDE 0.1 MG/1
0.1 TABLET ORAL 2 TIMES DAILY PRN
Status: DISCONTINUED | OUTPATIENT
Start: 2021-08-30 | End: 2021-08-31 | Stop reason: HOSPADM

## 2021-08-30 RX ORDER — HYDROCODONE BITARTRATE AND ACETAMINOPHEN 7.5; 325 MG/1; MG/1
2 TABLET ORAL EVERY 4 HOURS PRN
Status: DISCONTINUED | OUTPATIENT
Start: 2021-08-30 | End: 2021-08-31 | Stop reason: HOSPADM

## 2021-08-30 RX ORDER — LIDOCAINE HYDROCHLORIDE 20 MG/ML
INJECTION, SOLUTION EPIDURAL; INFILTRATION; INTRACAUDAL; PERINEURAL AS NEEDED
Status: DISCONTINUED | OUTPATIENT
Start: 2021-08-30 | End: 2021-08-30 | Stop reason: SURG

## 2021-08-30 RX ORDER — SODIUM CHLORIDE 0.9 % (FLUSH) 0.9 %
3-10 SYRINGE (ML) INJECTION AS NEEDED
Status: DISCONTINUED | OUTPATIENT
Start: 2021-08-30 | End: 2021-08-30 | Stop reason: HOSPADM

## 2021-08-30 RX ORDER — ONDANSETRON 2 MG/ML
4 INJECTION INTRAMUSCULAR; INTRAVENOUS ONCE AS NEEDED
Status: DISCONTINUED | OUTPATIENT
Start: 2021-08-30 | End: 2021-08-30 | Stop reason: HOSPADM

## 2021-08-30 RX ORDER — NALOXONE HCL 0.4 MG/ML
0.4 VIAL (ML) INJECTION AS NEEDED
Status: DISCONTINUED | OUTPATIENT
Start: 2021-08-30 | End: 2021-08-30 | Stop reason: HOSPADM

## 2021-08-30 RX ORDER — IBUPROFEN 600 MG/1
600 TABLET ORAL ONCE AS NEEDED
Status: DISCONTINUED | OUTPATIENT
Start: 2021-08-30 | End: 2021-08-30 | Stop reason: HOSPADM

## 2021-08-30 RX ORDER — BISACODYL 10 MG
10 SUPPOSITORY, RECTAL RECTAL DAILY PRN
Status: DISCONTINUED | OUTPATIENT
Start: 2021-08-30 | End: 2021-08-31 | Stop reason: HOSPADM

## 2021-08-30 RX ORDER — ONDANSETRON 2 MG/ML
4 INJECTION INTRAMUSCULAR; INTRAVENOUS EVERY 6 HOURS PRN
Status: DISCONTINUED | OUTPATIENT
Start: 2021-08-30 | End: 2021-08-31 | Stop reason: HOSPADM

## 2021-08-30 RX ORDER — OXYCODONE AND ACETAMINOPHEN 7.5; 325 MG/1; MG/1
2 TABLET ORAL EVERY 4 HOURS PRN
Status: DISCONTINUED | OUTPATIENT
Start: 2021-08-30 | End: 2021-08-30 | Stop reason: HOSPADM

## 2021-08-30 RX ORDER — PROPOFOL 10 MG/ML
VIAL (ML) INTRAVENOUS AS NEEDED
Status: DISCONTINUED | OUTPATIENT
Start: 2021-08-30 | End: 2021-08-30 | Stop reason: SURG

## 2021-08-30 RX ORDER — LABETALOL HYDROCHLORIDE 5 MG/ML
5 INJECTION, SOLUTION INTRAVENOUS
Status: DISCONTINUED | OUTPATIENT
Start: 2021-08-30 | End: 2021-08-30 | Stop reason: HOSPADM

## 2021-08-30 RX ADMIN — DEXAMETHASONE SODIUM PHOSPHATE 4 MG: 4 INJECTION, SOLUTION INTRA-ARTICULAR; INTRALESIONAL; INTRAMUSCULAR; INTRAVENOUS; SOFT TISSUE at 11:24

## 2021-08-30 RX ADMIN — METOCLOPRAMIDE HYDROCHLORIDE 5 MG: 5 INJECTION INTRAMUSCULAR; INTRAVENOUS at 11:24

## 2021-08-30 RX ADMIN — PROPOFOL 200 MG: 10 INJECTION, EMULSION INTRAVENOUS at 11:56

## 2021-08-30 RX ADMIN — SODIUM CHLORIDE, POTASSIUM CHLORIDE, SODIUM LACTATE AND CALCIUM CHLORIDE: 600; 310; 30; 20 INJECTION, SOLUTION INTRAVENOUS at 13:23

## 2021-08-30 RX ADMIN — SODIUM CHLORIDE, POTASSIUM CHLORIDE, SODIUM LACTATE AND CALCIUM CHLORIDE 1000 ML: 600; 310; 30; 20 INJECTION, SOLUTION INTRAVENOUS at 10:18

## 2021-08-30 RX ADMIN — Medication 2 G: at 11:53

## 2021-08-30 RX ADMIN — FENTANYL CITRATE 100 MCG: 50 INJECTION, SOLUTION INTRAMUSCULAR; INTRAVENOUS at 12:10

## 2021-08-30 RX ADMIN — FENTANYL CITRATE 100 MCG: 50 INJECTION, SOLUTION INTRAMUSCULAR; INTRAVENOUS at 11:56

## 2021-08-30 RX ADMIN — PROPOFOL 100 MG: 10 INJECTION, EMULSION INTRAVENOUS at 12:50

## 2021-08-30 RX ADMIN — ACETAMINOPHEN 1000 MG: 500 TABLET, FILM COATED ORAL at 11:24

## 2021-08-30 RX ADMIN — LIDOCAINE HYDROCHLORIDE 100 MG: 20 INJECTION, SOLUTION EPIDURAL; INFILTRATION; INTRACAUDAL; PERINEURAL at 11:56

## 2021-08-30 RX ADMIN — ONDANSETRON 4 MG: 2 INJECTION INTRAMUSCULAR; INTRAVENOUS at 13:22

## 2021-08-30 NOTE — ANESTHESIA PREPROCEDURE EVALUATION
Anesthesia Evaluation     Patient summary reviewed   history of anesthetic complications: PONV  NPO Solid Status: > 8 hours  NPO Liquid Status: > 8 hours           Airway   Mallampati: II  TM distance: >3 FB  Neck ROM: full  No difficulty expected  Dental - normal exam     Pulmonary    (-) asthma, sleep apnea, not a smoker  Cardiovascular   Exercise tolerance: excellent (>7 METS)    (+) hypertension, CAD (non occlusive, tortuous vessels- heart cath 8/2021 followed by Dr. barry), hyperlipidemia,   (-) past MI, dysrhythmias, cardiac stents      Neuro/Psych  (-) seizures, TIA, CVA  GI/Hepatic/Renal/Endo    (+)   diabetes mellitus,   (-) liver disease, no renal disease    Musculoskeletal     Abdominal    Substance History      OB/GYN          Other                        Anesthesia Plan    ASA 2     general     intravenous induction     Anesthetic plan, all risks, benefits, and alternatives have been provided, discussed and informed consent has been obtained with: patient.

## 2021-08-30 NOTE — ANESTHESIA PROCEDURE NOTES
Airway  Date/Time: 8/30/2021 11:56 AM  Airway not difficult    Indications and Patient Condition    Preoxygenated: yes      Final Airway Details        Number of attempts at approach: 1  Assessment: lips, teeth, and gum same as pre-op and atraumatic intubation    Additional Comments  #5LMA placed without difficulty unique

## 2021-08-30 NOTE — ANESTHESIA POSTPROCEDURE EVALUATION
Patient: Geoff Edwards    Procedure Summary     Date: 08/30/21 Room / Location:  [facility]  /  [facility]    Anesthesia Start: 1153 Anesthesia Stop: 1334    Procedure: CYSTOSCOPY TRANSURETHRAL RESECTION OF PROSTATE (N/A Bladder) Diagnosis:       Urinary retention      (Urinary retention [R33.9])    Surgeons: Ric Tobin MD Provider: Ric Mckinney CRNA    Anesthesia Type: general ASA Status: 2          Anesthesia Type: general    Vitals  Vitals Value Taken Time   /81 08/30/21 1427   Temp 97.5 °F (36.4 °C) 08/30/21 1425   Pulse 87 08/30/21 1434   Resp 12 08/30/21 1425   SpO2 95 % 08/30/21 1434   Vitals shown include unvalidated device data.        Post Anesthesia Care and Evaluation    Patient location during evaluation: PACU  Patient participation: complete - patient participated  Level of consciousness: awake and alert  Pain management: adequate  Airway patency: patent  Anesthetic complications: No anesthetic complications    Cardiovascular status: acceptable  Respiratory status: acceptable  Hydration status: acceptable    Comments: Blood pressure 143/83, pulse 89, temperature 97.9 °F (36.6 °C), resp. rate 18, SpO2 100 %.    Pt discharged from PACU based on mona score >8

## 2021-08-31 VITALS
OXYGEN SATURATION: 100 % | TEMPERATURE: 98.4 F | DIASTOLIC BLOOD PRESSURE: 60 MMHG | SYSTOLIC BLOOD PRESSURE: 128 MMHG | HEART RATE: 62 BPM | RESPIRATION RATE: 16 BRPM

## 2021-08-31 LAB
CYTO UR: NORMAL
DEPRECATED RDW RBC AUTO: 43.3 FL (ref 37–54)
ERYTHROCYTE [DISTWIDTH] IN BLOOD BY AUTOMATED COUNT: 12.6 % (ref 12.3–15.4)
HCT VFR BLD AUTO: 34.1 % (ref 37.5–51)
HGB BLD-MCNC: 11.9 G/DL (ref 13–17.7)
LAB AP CASE REPORT: NORMAL
MCH RBC QN AUTO: 32.6 PG (ref 26.6–33)
MCHC RBC AUTO-ENTMCNC: 34.9 G/DL (ref 31.5–35.7)
MCV RBC AUTO: 93.4 FL (ref 79–97)
PATH REPORT.FINAL DX SPEC: NORMAL
PATH REPORT.GROSS SPEC: NORMAL
PLATELET # BLD AUTO: 233 10*3/MM3 (ref 140–450)
PMV BLD AUTO: 11.5 FL (ref 6–12)
PSA FREE MFR SERPL: 9.1 %
PSA FREE SERPL-MCNC: 1.88 NG/ML
PSA SERPL-MCNC: 20.6 NG/ML (ref 0–4)
RBC # BLD AUTO: 3.65 10*6/MM3 (ref 4.14–5.8)
WBC # BLD AUTO: 12.44 10*3/MM3 (ref 3.4–10.8)

## 2021-08-31 PROCEDURE — 85027 COMPLETE CBC AUTOMATED: CPT | Performed by: UROLOGY

## 2021-08-31 RX ORDER — CEPHALEXIN 500 MG/1
500 CAPSULE ORAL 3 TIMES DAILY
Qty: 9 CAPSULE | Refills: 0 | Status: SHIPPED | OUTPATIENT
Start: 2021-08-31 | End: 2021-09-03

## 2021-08-31 RX ORDER — HYDROCODONE BITARTRATE AND ACETAMINOPHEN 7.5; 325 MG/1; MG/1
1 TABLET ORAL EVERY 6 HOURS PRN
Qty: 8 TABLET | Refills: 0 | Status: SHIPPED | OUTPATIENT
Start: 2021-08-31 | End: 2021-09-29

## 2021-09-02 ENCOUNTER — PROCEDURE VISIT (OUTPATIENT)
Dept: UROLOGY | Facility: CLINIC | Age: 71
End: 2021-09-02

## 2021-09-02 ENCOUNTER — TELEPHONE (OUTPATIENT)
Dept: UROLOGY | Facility: CLINIC | Age: 71
End: 2021-09-02

## 2021-09-02 DIAGNOSIS — N40.1 BPH WITH URINARY OBSTRUCTION: Primary | ICD-10-CM

## 2021-09-02 DIAGNOSIS — N13.8 BPH WITH URINARY OBSTRUCTION: Primary | ICD-10-CM

## 2021-09-02 NOTE — PROGRESS NOTES
Patient is here today to have their catheter removed. The patient was seen by Dr. Tobin on 08/30. The balloon to the catheter was deflated using a 10cc syringe. The Catheter was then removed without difficulty. The patient was advised to report back to the emergency room and or urgent care if they can not void after 6 hours. I urged the patient to drink plenty of fluids. Patient denied any fever, N&V, or chills. Patient acknowledged and verbalized understanding. Johnson MAURER was in the office at the time of the procedure.     Niya Kim  Urology Medical Assistant    Reviewed and Agreed with Medical Assistant's documentation as above.

## 2021-09-02 NOTE — TELEPHONE ENCOUNTER
Pt called and wanted to know if he needed to finish his keflex since his cath has been removed. I informed him to finish it out completely. He also stated he has already urinated 2x's since the cath has been removed.

## 2021-09-03 ENCOUNTER — OFFICE VISIT (OUTPATIENT)
Dept: INTERNAL MEDICINE | Facility: CLINIC | Age: 71
End: 2021-09-03

## 2021-09-03 ENCOUNTER — TELEPHONE (OUTPATIENT)
Dept: INTERNAL MEDICINE | Facility: CLINIC | Age: 71
End: 2021-09-03

## 2021-09-03 ENCOUNTER — HOSPITAL ENCOUNTER (OUTPATIENT)
Dept: ULTRASOUND IMAGING | Facility: HOSPITAL | Age: 71
Discharge: HOME | End: 2021-09-03
Admitting: NURSE PRACTITIONER

## 2021-09-03 VITALS
SYSTOLIC BLOOD PRESSURE: 114 MMHG | OXYGEN SATURATION: 100 % | BODY MASS INDEX: 24.91 KG/M2 | WEIGHT: 174 LBS | HEIGHT: 70 IN | DIASTOLIC BLOOD PRESSURE: 76 MMHG | HEART RATE: 76 BPM | TEMPERATURE: 97.3 F

## 2021-09-03 DIAGNOSIS — M79.604 PAIN AND SWELLING OF RIGHT LOWER EXTREMITY: ICD-10-CM

## 2021-09-03 DIAGNOSIS — M79.89 PAIN AND SWELLING OF RIGHT LOWER EXTREMITY: ICD-10-CM

## 2021-09-03 DIAGNOSIS — N40.1 BPH WITH URINARY OBSTRUCTION: ICD-10-CM

## 2021-09-03 DIAGNOSIS — Z09 HOSPITAL DISCHARGE FOLLOW-UP: Primary | ICD-10-CM

## 2021-09-03 DIAGNOSIS — M25.561 ACUTE PAIN OF RIGHT KNEE: Primary | ICD-10-CM

## 2021-09-03 DIAGNOSIS — N13.8 BPH WITH URINARY OBSTRUCTION: ICD-10-CM

## 2021-09-03 PROCEDURE — 99214 OFFICE O/P EST MOD 30 MIN: CPT | Performed by: NURSE PRACTITIONER

## 2021-09-03 PROCEDURE — 93971 EXTREMITY STUDY: CPT

## 2021-09-03 PROCEDURE — 1111F DSCHRG MED/CURRENT MED MERGE: CPT | Performed by: NURSE PRACTITIONER

## 2021-09-03 RX ORDER — METHYLPREDNISOLONE 4 MG/1
TABLET ORAL
Qty: 1 EACH | Refills: 0 | Status: SHIPPED | OUTPATIENT
Start: 2021-09-03 | End: 2021-09-29

## 2021-09-03 NOTE — PROGRESS NOTES
Subjective   Geoff Edwards is a 71 y.o. male.   Chief Complaint   Patient presents with   • Hospital Follow Up Visit     @ [facility] 8/31. Prostate Procedure   • Leg Pain     Right Thigh pain, difficulty walking x 3 days       Geoff presents today for hospital follow up after having a transurethral resection of the prostate. He states he is doing well with healing.  He had his catheter removed yesterday and has been voiding without difficulty.  He states he does still have some hematuria and is urinating frequently but denies pain.  He denies abdominal or pelvic pain.  There is no fever.  He continues on keflex given at discharge.  Prior to this surgery, he also had a heart cath completed by cardiology without intervention.  He has since had follow up with their office.   He does complain today of right lower extremity pain that started after having catheter out yesterday.  He states the catheter bag was strapped tightly to his right thigh.  After having the bag removed, he noticed swelling to the right thigh and knee accompanied with pain.  He is having difficulty walking on the leg due to pain.  He has a history of a right baker's cyst but states this feels different.        The following portions of the patient's history were reviewed and updated as appropriate: allergies, current medications, past family history, past medical history, past social history, past surgical history and problem list.    Review of Systems   Constitutional: Negative for activity change, appetite change, fatigue, fever, unexpected weight gain and unexpected weight loss.   HENT: Negative for swollen glands, trouble swallowing and voice change.    Eyes: Negative for blurred vision and visual disturbance.   Respiratory: Negative for cough and shortness of breath.    Cardiovascular: Negative for chest pain, palpitations and leg swelling.   Gastrointestinal: Negative for abdominal pain, constipation, diarrhea, nausea, vomiting and  indigestion.   Endocrine: Negative for cold intolerance, heat intolerance, polydipsia and polyphagia.   Genitourinary: Positive for frequency and hematuria. Negative for dysuria.   Musculoskeletal: Negative for arthralgias, back pain, joint swelling and neck pain.        Right thigh pain   Skin: Negative for color change, rash and skin lesions.   Neurological: Negative for dizziness, weakness, headache, memory problem and confusion.   Hematological: Does not bruise/bleed easily.   Psychiatric/Behavioral: Negative for agitation, hallucinations and suicidal ideas. The patient is not nervous/anxious.        Objective   Past Medical History:   Diagnosis Date   • Abnormal PSA    • Chest pain    • Diabetes mellitus (CMS/HCC)     diet controlled   • Elevated cholesterol    • Hypertension    • PONV (postoperative nausea and vomiting)     SEVERE   • Seasonal allergies       Past Surgical History:   Procedure Laterality Date   • APPENDECTOMY     • CARDIAC CATHETERIZATION     • CARDIAC CATHETERIZATION Left 8/5/2021    Procedure: Cardiac Catheterization/Vascular Study;  Surgeon: Lkue Dixon MD;  Location: [facility] CATH INVASIVE LOCATION;  Service: Cardiology;  Laterality: Left;   • COLONOSCOPY  11/16/2012    Normal. Repeat in 5 years. Dr. Tez Perdomo   • COLONOSCOPY N/A 11/21/2017    Procedure: COLONOSCOPY WITH ANESTHESIA;  Surgeon: Tez Perdomo MD;  Location: [facility] ENDOSCOPY;  Service:    • CYSTOSCOPY TRANSURETHRAL RESECTION OF PROSTATE N/A 8/30/2021    Procedure: CYSTOSCOPY TRANSURETHRAL RESECTION OF PROSTATE;  Surgeon: Ric Tobin MD;  Location: [facility] OR;  Service: Urology;  Laterality: N/A;   • MANDIBLE SURGERY     • PROSTATE BIOPSY     • PROSTATE BIOPSY N/A 3/19/2020    Procedure: PROSTATE ULTRASOUND BIOPSY MRI FUSION WITH URONAV;  Surgeon: Ric Tobin MD;  Location: [facility] OR;  Service: Urology;  Laterality: N/A;        Current Outpatient Medications:   •  amLODIPine (NORVASC) 5 MG tablet, Take  1 tablet by mouth Daily., Disp: 30 tablet, Rfl: 11  •  aspirin (aspirin) 81 MG EC tablet, Take 1 tablet by mouth Daily. (Patient taking differently: Take 81 mg by mouth Daily. STOP FOR SURGERY), Disp: 30 tablet, Rfl: 11  •  cephalexin (KEFLEX) 500 MG capsule, Take 1 capsule by mouth 3 (Three) Times a Day for 3 days. Start day prior to cath removal, Disp: 9 capsule, Rfl: 0  •  cloNIDine (Catapres) 0.1 MG tablet, Take 1 tablet by mouth 2 (Two) Times a Day As Needed for High Blood Pressure (> 150/90)., Disp: 60 tablet, Rfl: 11  •  Contour Next Test test strip, USE ONE EVERY DAY, Disp: 100 each, Rfl: 3  •  fluticasone (FLONASE) 50 MCG/ACT nasal spray, 2 sprays by Each Nare route 2 (Two) Times a Day., Disp: 16 g, Rfl: 3  •  HYDROcodone-acetaminophen (NORCO) 7.5-325 MG per tablet, Take 1 tablet by mouth Every 6 (Six) Hours As Needed for Moderate Pain ., Disp: 8 tablet, Rfl: 0  •  ipratropium (ATROVENT) 0.06 % nasal spray, 2 sprays into the nostril(s) as directed by provider 4 (Four) Times a Day As Needed for Rhinitis. For drainage, Disp: 45 mL, Rfl: 3  •  irbesartan (AVAPRO) 300 MG tablet, TAKE 1 TABLET BY MOUTH DAILY, Disp: 90 tablet, Rfl: 3  •  Lancets Ultra Thin misc, 1 (ONE) MISC DAILY, Disp: 100 each, Rfl: 3  •  nitroglycerin (NITROSTAT) 0.4 MG SL tablet, 1 under the tongue as needed for angina, may repeat q5mins for up three doses, Disp: 25 tablet, Rfl: 11  •  rosuvastatin (CRESTOR) 5 MG tablet, Take 1 tablet by mouth Daily., Disp: 30 tablet, Rfl: 11      Vitals:    09/03/21 1133   BP: 114/76   Pulse: 76   Temp: 97.3 °F (36.3 °C)   SpO2: 100%         09/03/21  1133   Weight: 78.9 kg (174 lb)       Body mass index is 24.97 kg/m².    Physical Exam  Constitutional:       General: He is not in acute distress.     Appearance: He is well-developed.   HENT:      Head: Normocephalic.      Right Ear: External ear normal.      Left Ear: External ear normal.      Mouth/Throat:      Mouth: No oral lesions.   Eyes:       Conjunctiva/sclera: Conjunctivae normal.   Cardiovascular:      Rate and Rhythm: Normal rate and regular rhythm.      Heart sounds: Normal heart sounds.   Pulmonary:      Effort: Pulmonary effort is normal.      Breath sounds: Normal breath sounds.   Abdominal:      Palpations: Abdomen is soft.      Tenderness: There is no abdominal tenderness.   Genitourinary:     Penis: Normal.    Musculoskeletal:      Comments: Right lower extremity presents with swelling to the lower thigh area and knee.  FROM noted to the right knee; limping while ambulating.  No discoloration noted. Tenderness with palpation behind the knee and with palpation directly over the patella.    Lymphadenopathy:      Lower Body: No right inguinal adenopathy. No left inguinal adenopathy.   Skin:     General: Skin is warm and dry.   Neurological:      Mental Status: He is alert and oriented to person, place, and time.      Gait: Gait normal.   Psychiatric:         Mood and Affect: Mood normal.         Speech: Speech normal.         Behavior: Behavior normal.         Thought Content: Thought content normal.               Assessment/Plan   Diagnoses and all orders for this visit:    1. Hospital discharge follow-up (Primary)    2. BPH with urinary obstruction    3. Pain and swelling of right lower extremity  -     US Venous Doppler Lower Extremity Right (duplex); Future      Overall he seems to be doing well some a surgical standpoint.  I am concerned, however, about the sudden swelling and pain to the right lower extremity.  Will obtain Venous U/S to rule out DVT as his circulation may have been compromised due to the tight taping of his catheter bag (per patient report) and recent surgical procedures.  He is agreeable to this.  Pending results at this time.   I've advised continuing with follow up with urology as scheduled.  If having any difficulty passing urine, blood does not clear, or he begins to have any discomfort, follow back up with urology or  contact this office.

## 2021-09-03 NOTE — TELEPHONE ENCOUNTER
Caller: WashingtonGeoff    Relationship: Self    Best call back number: [phone]    What is the best time to reach you: ANYTIME     Who are you requesting to speak with (clinical staff, provider,  specific staff member): CLINICAL STAFF     Do you know the name of the person who called: GEOFF WASHINGTON    What was the call regarding: PATIENT REQUESTING ULTRASOUND RESULTS FROM TODAY 09/03/2021, AND WOULD ALSO LIKE TO KNOW IF A PAIN MEDICATION IS GOING TO BE CALLED IN FOR THIS. PLEASE ADVISE.    Do you require a callback: YES     CONFIRMED PHARMACY -- [facility] - [place] [facility] - [address] - [phone]  - [phone]   [phone]

## 2021-09-06 ENCOUNTER — NURSE TRIAGE (OUTPATIENT)
Dept: CALL CENTER | Facility: HOSPITAL | Age: 71
End: 2021-09-06

## 2021-09-06 NOTE — TELEPHONE ENCOUNTER
Post op constipation. Taking Norco pain medication and no instructions re: stool softners. Advised MOM, Colace or Miralax. Care advice per constipation guideline.  Reason for Disposition  • MILD constipation    Additional Information  • Negative: [1] Abdomen pain is main symptom AND [2] male  • Negative: [1] Abdomen pain is main symptom AND [2] adult female  • Negative: Rectal bleeding or blood in stool is main symptom  • Negative: Rectal pain or itching is main symptom  • Negative: Constipation in a cancer patient who is currently (or recently) receiving chemotherapy or radiation therapy, or cancer patient who has metastatic or end-stage cancer and is receiving palliative care  • Negative: Patient sounds very sick or weak to the triager  • Negative: [1] Vomiting AND [2] abdomen looks much more swollen than usual  • Negative: [1] Vomiting AND [2] contains bile (green color)  • Negative: [1] Constant abdominal pain AND [2] present > 2 hours  • Negative: [1] Rectal pain or fullness from fecal impaction (rectum full of stool) AND [2] NOT better after SITZ bath, suppository or enema  • Negative: [1] Intermittent mild abdominal pain AND [2] fever  • Negative: Abdomen is more swollen than usual  • Negative: Last bowel movement (BM) > 4 days ago  • Negative: Leaking stool  • Negative: Unable to have a bowel movement (BM) without manually removing stool (using finger to pull out stool or perform disimpaction)  • Negative: Unable to have a bowel movement (BM) without laxative or enema  • Negative: [1] Constipation persists > 1 week AND [2] no improvement after using CARE ADVICE  • Negative: [1] Weight loss > 10 pounds (5 kg) AND [2] not dieting  • Negative: Pencil-like, narrow stools  • Negative: Taking new prescription medication  • Negative: [1] Minor bleeding from rectum (e.g., blood just on toilet paper, few drops, streaks on surface of normal formed BM) AND [2] 3 or more times  • Negative: [1] Uses laxative (e.g., PEG /  "Miralax. Milk of magnesia) or enema AND [2] > once a month  • Negative: Constipation is a chronic symptom (recurrent or ongoing AND present > 4 weeks)    Answer Assessment - Initial Assessment Questions  1. STOOL PATTERN OR FREQUENCY: \"How often do you pass bowel movements (BMs)?\"  (Normal range: tid to q 3 days)  \"When was the last BM passed?\"        Small BM 2 days ago  2. STRAINING: \"Do you have to strain to have a BM?\"       *No Answer*  3. RECTAL PAIN: \"Does your rectum hurt when the stool comes out?\" If Yes, ask: \"Do you have hemorrhoids? How bad is the pain?\"  (Scale 1-10; or mild, moderate, severe)   na  4. STOOL COMPOSITION: \"Are the stools hard?\"   Small amount 2 days ago not really hard  5. BLOOD ON STOOLS: \"Has there been any blood on the toilet tissue or on the surface of the BM?\" If Yes, ask: \"When was the last time?\"   no  6. CHRONIC CONSTIPATION: \"Is this a new problem for you?\"  If no, ask: \"How long have you had this problem?\" (days, weeks, months)   Had surgery 08/30/2021 TURP  7. CHANGES IN DIET OR HYDRATION: \"Have there been any recent changes in your diet?\" \"How much fluids are you drinking consuming on a daily basis?\"  \"How much have you had to drink today?\"    8. MEDICATIONS: \"Have you been taking any new medications?\" \"Are you taking any narcotic pain medications?\" (e.g., Vicoden, Percocet, morphine, dilaudid)     Yes Post op surgery Norco9. LAXATIVES: \"Have you been using any stool softeners, laxatives, or enemas?\"  If yes, ask \"What, how often, and when was the last time?\"  10.ACTIVITY:  \"How much walking do you do every day? on a daily basis?\"  \"Has your activity level decreased in the past week?\"   post op  11. CAUSE: \"What do you think is causing the constipation?\"        Taking pain medication Norco  12. OTHER SYMPTOMS: \"Do you have any other symptoms?\" (e.g., abdominal pain, bloating, fever, vomiting)  passing gas  13. MEDICAL HISTORY: \"Do you have a history of hemorrhoids, rectal " "fissures, or rectal surgery or rectal abscess?\"    no  14. PREGNANCY: \"Is there any chance you are pregnant?\" \"When was your last menstrual period?\"        *No Answer*na    Protocols used: CONSTIPATION-ADULT-AH    "

## 2021-09-07 ENCOUNTER — NURSE TRIAGE (OUTPATIENT)
Dept: CALL CENTER | Facility: HOSPITAL | Age: 71
End: 2021-09-07

## 2021-09-08 ENCOUNTER — NURSE TRIAGE (OUTPATIENT)
Dept: CALL CENTER | Facility: HOSPITAL | Age: 71
End: 2021-09-08

## 2021-09-08 NOTE — TELEPHONE ENCOUNTER
"States he had surgery for his prostate on 8/30/21. States he hasn't had a BM since then. States he called Monday and talked to [facility]line and has been taking MOM, colace and Miralax since then. States it isn't helping. He is also taking prune juice and water.    He is passing gas but no BM.     Discussed asking surgeon's office before trying magnesium citrate and not to use suppository or enema without checking with them due to recent TURP.     Transferred call to Dr. Tobin office with patient consent.     Reason for Disposition  • Last bowel movement (BM) > 4 days ago    Additional Information  • Negative: [1] Abdomen pain is main symptom AND [2] male  • Negative: [1] Abdomen pain is main symptom AND [2] adult female  • Negative: Rectal bleeding or blood in stool is main symptom  • Negative: Rectal pain or itching is main symptom  • Negative: Constipation in a cancer patient who is currently (or recently) receiving chemotherapy or radiation therapy, or cancer patient who has metastatic or end-stage cancer and is receiving palliative care  • Negative: Patient sounds very sick or weak to the triager  • Negative: [1] Vomiting AND [2] abdomen looks much more swollen than usual  • Negative: [1] Vomiting AND [2] contains bile (green color)  • Negative: [1] Constant abdominal pain AND [2] present > 2 hours  • Negative: [1] Rectal pain or fullness from fecal impaction (rectum full of stool) AND [2] NOT better after SITZ bath, suppository or enema  • Negative: [1] Intermittent mild abdominal pain AND [2] fever  • Negative: Abdomen is more swollen than usual  • Negative: Leaking stool  • Negative: Unable to have a bowel movement (BM) without manually removing stool (using finger to pull out stool or perform disimpaction)    Answer Assessment - Initial Assessment Questions  1. STOOL PATTERN OR FREQUENCY: \"How often do you pass bowel movements (BMs)?\"  (Normal range: tid to q 3 days)  \"When was the last BM passed?\"        See " "note  2. STRAINING: \"Do you have to strain to have a BM?\"       n/a  3. RECTAL PAIN: \"Does your rectum hurt when the stool comes out?\" If Yes, ask: \"Do you have hemorrhoids? How bad is the pain?\"  (Scale 1-10; or mild, moderate, severe)      n/a  4. STOOL COMPOSITION: \"Are the stools hard?\"       n/a  5. BLOOD ON STOOLS: \"Has there been any blood on the toilet tissue or on the surface of the BM?\" If Yes, ask: \"When was the last time?\"       n/a  6. CHRONIC CONSTIPATION: \"Is this a new problem for you?\"  If no, ask: \"How long have you had this problem?\" (days, weeks, months)       n/a  7. CHANGES IN DIET OR HYDRATION: \"Have there been any recent changes in your diet?\" \"How much fluids are you drinking consuming on a daily basis?\"  \"How much have you had to drink today?\"      n/a  8. MEDICATIONS: \"Have you been taking any new medications?\" \"Are you taking any narcotic pain medications?\" (e.g., Vicoden, Percocet, morphine, dilaudid)      nn  9. LAXATIVES: \"Have you been using any stool softeners, laxatives, or enemas?\"  If yes, ask \"What, how often, and when was the last time?\"  10.ACTIVITY:  \"How much walking do you do every day? on a daily basis?\"  \"Has your activity level decreased in the past week?\"         A/n/a  11. CAUSE: \"What do you think is causing the constipation?\"         n/a  12. OTHER SYMPTOMS: \"Do you have any other symptoms?\" (e.g., abdominal pain, bloating, fever, vomiting)        n/a  13. MEDICAL HISTORY: \"Do you have a history of hemorrhoids, rectal fissures, or rectal surgery or rectal abscess?\"          n/a  14. PREGNANCY: \"Is there any chance you are pregnant?\" \"When was your last menstrual period?\"        n/a    Protocols used: CONSTIPATION-ADULT-AH      "

## 2021-09-29 ENCOUNTER — OFFICE VISIT (OUTPATIENT)
Dept: INTERNAL MEDICINE | Facility: CLINIC | Age: 71
End: 2021-09-29

## 2021-09-29 VITALS
WEIGHT: 177 LBS | HEIGHT: 70 IN | BODY MASS INDEX: 25.34 KG/M2 | TEMPERATURE: 97.8 F | DIASTOLIC BLOOD PRESSURE: 68 MMHG | HEART RATE: 59 BPM | SYSTOLIC BLOOD PRESSURE: 116 MMHG | OXYGEN SATURATION: 99 %

## 2021-09-29 DIAGNOSIS — I10 BENIGN HYPERTENSION: ICD-10-CM

## 2021-09-29 DIAGNOSIS — Z23 FLU VACCINE NEED: ICD-10-CM

## 2021-09-29 DIAGNOSIS — E78.5 HYPERLIPIDEMIA LDL GOAL <70: ICD-10-CM

## 2021-09-29 DIAGNOSIS — E11.9 DIET-CONTROLLED DIABETES MELLITUS (HCC): ICD-10-CM

## 2021-09-29 LAB — HBA1C MFR BLD: 6.2 %

## 2021-09-29 PROCEDURE — 83036 HEMOGLOBIN GLYCOSYLATED A1C: CPT | Performed by: INTERNAL MEDICINE

## 2021-09-29 PROCEDURE — 90662 IIV NO PRSV INCREASED AG IM: CPT | Performed by: INTERNAL MEDICINE

## 2021-09-29 PROCEDURE — 99214 OFFICE O/P EST MOD 30 MIN: CPT | Performed by: INTERNAL MEDICINE

## 2021-09-29 PROCEDURE — G0008 ADMIN INFLUENZA VIRUS VAC: HCPCS | Performed by: INTERNAL MEDICINE

## 2021-09-29 NOTE — PROGRESS NOTES
Subjective   Geoff Edwards is a 71 y.o. male.   Chief Complaint   Patient presents with   • Hypertension     6 month follow up    • Diabetes     a1c: 6.2   • other     pt recently had prostate surgery in last month        History of Present Illness this is a follow-up for patient's hypertension diabetes.  We are also reviewing recent hospitalization for a TURP due to BPH.  Also he had a episode of chest pain which resulted in a stress test and an abnormal exam he ultimately had a heart cath which did not show significant occlusive disease at least he had no stents placed.    The following portions of the patient's history were reviewed and updated as appropriate: allergies, current medications, past family history, past medical history, past social history, past surgical history and problem list.    Review of Systems   Constitutional: Negative for activity change, appetite change, fatigue, fever, unexpected weight gain and unexpected weight loss.   HENT: Negative for swollen glands, trouble swallowing and voice change.    Eyes: Negative for blurred vision and visual disturbance.   Respiratory: Negative for cough and shortness of breath.    Cardiovascular: Negative for chest pain, palpitations and leg swelling.   Gastrointestinal: Negative for abdominal pain, constipation, diarrhea, nausea, vomiting and indigestion.   Endocrine: Negative for cold intolerance, heat intolerance, polydipsia and polyphagia.   Genitourinary: Negative for dysuria and frequency.   Musculoskeletal: Negative for arthralgias, back pain, joint swelling and neck pain.   Skin: Negative for color change, rash and skin lesions.   Neurological: Negative for dizziness, weakness, headache, memory problem and confusion.   Hematological: Does not bruise/bleed easily.   Psychiatric/Behavioral: Negative for agitation, hallucinations and suicidal ideas. The patient is not nervous/anxious.        Objective   Past Medical History:   Diagnosis Date   •  Abnormal PSA    • Chest pain    • Diabetes mellitus (CMS/HCC)     diet controlled   • Elevated cholesterol    • Hypertension    • PONV (postoperative nausea and vomiting)     SEVERE   • Seasonal allergies       Past Surgical History:   Procedure Laterality Date   • APPENDECTOMY     • CARDIAC CATHETERIZATION     • CARDIAC CATHETERIZATION Left 8/5/2021    Procedure: Cardiac Catheterization/Vascular Study;  Surgeon: Luke Dixon MD;  Location: [facility] CATH INVASIVE LOCATION;  Service: Cardiology;  Laterality: Left;   • COLONOSCOPY  11/16/2012    Normal. Repeat in 5 years. Dr. Tez Perdomo   • COLONOSCOPY N/A 11/21/2017    Procedure: COLONOSCOPY WITH ANESTHESIA;  Surgeon: Tez Perdomo MD;  Location: [facility] ENDOSCOPY;  Service:    • CYSTOSCOPY TRANSURETHRAL RESECTION OF PROSTATE N/A 8/30/2021    Procedure: CYSTOSCOPY TRANSURETHRAL RESECTION OF PROSTATE;  Surgeon: Ric Tobin MD;  Location: [facility] OR;  Service: Urology;  Laterality: N/A;   • MANDIBLE SURGERY     • PROSTATE BIOPSY     • PROSTATE BIOPSY N/A 3/19/2020    Procedure: PROSTATE ULTRASOUND BIOPSY MRI FUSION WITH URONAV;  Surgeon: Ric Tobin MD;  Location: [facility] OR;  Service: Urology;  Laterality: N/A;        Current Outpatient Medications:   •  amLODIPine (NORVASC) 5 MG tablet, Take 1 tablet by mouth Daily., Disp: 30 tablet, Rfl: 11  •  aspirin (aspirin) 81 MG EC tablet, Take 1 tablet by mouth Daily., Disp: 30 tablet, Rfl: 11  •  cloNIDine (Catapres) 0.1 MG tablet, Take 1 tablet by mouth 2 (Two) Times a Day As Needed for High Blood Pressure (> 150/90). (Patient taking differently: Take 0.1 mg by mouth Daily.), Disp: 60 tablet, Rfl: 11  •  Contour Next Test test strip, USE ONE EVERY DAY, Disp: 100 each, Rfl: 3  •  fluticasone (FLONASE) 50 MCG/ACT nasal spray, 2 sprays by Each Nare route 2 (Two) Times a Day., Disp: 16 g, Rfl: 3  •  ipratropium (ATROVENT) 0.06 % nasal spray, 2 sprays into the nostril(s) as directed by provider 4 (Four)  Times a Day As Needed for Rhinitis. For drainage, Disp: 45 mL, Rfl: 3  •  irbesartan (AVAPRO) 300 MG tablet, TAKE 1 TABLET BY MOUTH DAILY, Disp: 90 tablet, Rfl: 3  •  Lancets Ultra Thin misc, 1 (ONE) MISC DAILY, Disp: 100 each, Rfl: 3  •  nitroglycerin (NITROSTAT) 0.4 MG SL tablet, 1 under the tongue as needed for angina, may repeat q5mins for up three doses, Disp: 25 tablet, Rfl: 11  •  rosuvastatin (CRESTOR) 5 MG tablet, Take 1 tablet by mouth Daily., Disp: 30 tablet, Rfl: 11      Vitals:    09/29/21 0837   BP: 116/68   Pulse: 59   Temp: 97.8 °F (36.6 °C)   SpO2: 99%         09/29/21  0837   Weight: 80.3 kg (177 lb)       Body mass index is 25.4 kg/m².    Physical Exam  Vitals and nursing note reviewed.   Constitutional:       General: He is not in acute distress.     Appearance: Normal appearance. He is well-developed.   HENT:      Head: Normocephalic and atraumatic.      Right Ear: External ear normal.      Left Ear: External ear normal.      Nose: Nose normal.   Eyes:      Extraocular Movements: Extraocular movements intact.      Conjunctiva/sclera: Conjunctivae normal.      Pupils: Pupils are equal, round, and reactive to light.   Cardiovascular:      Rate and Rhythm: Normal rate and regular rhythm.      Pulses: Normal pulses.      Heart sounds: Normal heart sounds.   Pulmonary:      Effort: Pulmonary effort is normal.      Breath sounds: Normal breath sounds.   Abdominal:      General: Bowel sounds are normal.      Palpations: Abdomen is soft.   Musculoskeletal:         General: Normal range of motion.      Cervical back: Normal range of motion and neck supple. No rigidity. No muscular tenderness.   Skin:     General: Skin is warm and dry.   Neurological:      General: No focal deficit present.      Mental Status: He is alert and oriented to person, place, and time.   Psychiatric:         Mood and Affect: Mood normal.         Behavior: Behavior normal.               Assessment/Plan   Diagnoses and all orders  for this visit:    1. Benign hypertension (Primary)    2. Diet-controlled diabetes mellitus (CMS/[facility])  -     POC Glycated Hemoglobin, Total  -     AST  -     ALT    3. Hyperlipidemia LDL goal <70      At today's visit we reviewed his recent hospitalizations both for chest pain and for TURP.  His visit today is centered around blood pressure which is well controlled with current medication his diabetes is well controlled we are going to go ahead and check his lipid panel to make sure that he is at goal.  See him back in 6 months for annual checkup.

## 2021-09-30 LAB
ALT SERPL-CCNC: 27 U/L (ref 1–41)
AST SERPL-CCNC: 21 U/L (ref 1–40)
CHOLEST SERPL-MCNC: 128 MG/DL (ref 0–200)
HDLC SERPL-MCNC: 52 MG/DL (ref 40–60)
LDLC SERPL CALC-MCNC: 61 MG/DL (ref 0–100)
Lab: NORMAL
TRIGL SERPL-MCNC: 78 MG/DL (ref 0–150)
VLDLC SERPL CALC-MCNC: 15 MG/DL (ref 5–40)
WRITTEN AUTHORIZATION: NORMAL

## 2021-10-01 ENCOUNTER — TELEPHONE (OUTPATIENT)
Dept: INTERNAL MEDICINE | Facility: CLINIC | Age: 71
End: 2021-10-01

## 2021-10-20 DIAGNOSIS — N50.811 PAIN IN RIGHT TESTICLE: Primary | ICD-10-CM

## 2021-10-21 ENCOUNTER — HOSPITAL ENCOUNTER (OUTPATIENT)
Dept: ULTRASOUND IMAGING | Facility: HOSPITAL | Age: 71
Discharge: HOME | End: 2021-10-21
Admitting: PHYSICIAN ASSISTANT

## 2021-10-21 ENCOUNTER — OFFICE VISIT (OUTPATIENT)
Dept: UROLOGY | Facility: CLINIC | Age: 71
End: 2021-10-21

## 2021-10-21 VITALS — TEMPERATURE: 99.8 F | HEIGHT: 71 IN | BODY MASS INDEX: 25.09 KG/M2 | WEIGHT: 179.2 LBS

## 2021-10-21 DIAGNOSIS — N50.811 PAIN IN RIGHT TESTICLE: ICD-10-CM

## 2021-10-21 DIAGNOSIS — N45.1 RIGHT EPIDIDYMITIS: Primary | ICD-10-CM

## 2021-10-21 PROCEDURE — 76870 US EXAM SCROTUM: CPT

## 2021-10-21 PROCEDURE — 99214 OFFICE O/P EST MOD 30 MIN: CPT | Performed by: PHYSICIAN ASSISTANT

## 2021-10-21 RX ORDER — CIPROFLOXACIN 500 MG/1
500 TABLET, FILM COATED ORAL 2 TIMES DAILY
Qty: 28 TABLET | Refills: 0 | Status: SHIPPED | OUTPATIENT
Start: 2021-10-21 | End: 2021-11-04

## 2021-10-21 NOTE — PROGRESS NOTES
Subjective    Mr. Edwards is 71 y.o. male    Chief Complaint:Testicular pain  History of Present Illness  Patient is a 71-year-old gentleman with 1 day onset of right testicular swelling and pain.  He has not had any fever or chills.  He has had some frequency.  He had transurethral resection of the prostate 08/30/2021.  The patient got a scrotal ultrasound prior to his visit which shows evidence of right epididymitis.    The following portions of the patient's history were reviewed and updated as appropriate: allergies, current medications, past family history, past medical history, past social history, past surgical history and problem list.    Review of Systems   Constitutional: Negative for chills and fever.   Gastrointestinal: Negative for abdominal pain, anal bleeding and blood in stool.   Genitourinary: Positive for scrotal swelling and testicular pain (right side ). Negative for dysuria and hematuria.         Current Outpatient Medications:   •  amLODIPine (NORVASC) 5 MG tablet, Take 1 tablet by mouth Daily., Disp: 30 tablet, Rfl: 11  •  aspirin (aspirin) 81 MG EC tablet, Take 1 tablet by mouth Daily., Disp: 30 tablet, Rfl: 11  •  cloNIDine (Catapres) 0.1 MG tablet, Take 1 tablet by mouth 2 (Two) Times a Day As Needed for High Blood Pressure (> 150/90). (Patient taking differently: Take 0.1 mg by mouth Daily.), Disp: 60 tablet, Rfl: 11  •  Contour Next Test test strip, USE ONE EVERY DAY, Disp: 100 each, Rfl: 3  •  fluticasone (FLONASE) 50 MCG/ACT nasal spray, 2 sprays by Each Nare route 2 (Two) Times a Day., Disp: 16 g, Rfl: 3  •  ipratropium (ATROVENT) 0.06 % nasal spray, 2 sprays into the nostril(s) as directed by provider 4 (Four) Times a Day As Needed for Rhinitis. For drainage, Disp: 45 mL, Rfl: 3  •  irbesartan (AVAPRO) 300 MG tablet, TAKE 1 TABLET BY MOUTH DAILY, Disp: 90 tablet, Rfl: 3  •  Lancets Ultra Thin misc, 1 (ONE) MISC DAILY, Disp: 100 each, Rfl: 3  •  nitroglycerin (NITROSTAT) 0.4 MG SL  tablet, 1 under the tongue as needed for angina, may repeat q5mins for up three doses, Disp: 25 tablet, Rfl: 11  •  rosuvastatin (CRESTOR) 5 MG tablet, Take 1 tablet by mouth Daily., Disp: 30 tablet, Rfl: 11  •  ciprofloxacin (Cipro) 500 MG tablet, Take 1 tablet by mouth 2 (Two) Times a Day for 14 days., Disp: 28 tablet, Rfl: 0    Past Medical History:   Diagnosis Date   • Abnormal PSA    • Chest pain    • Diabetes mellitus (HCC)     diet controlled   • Elevated cholesterol    • Hypertension    • PONV (postoperative nausea and vomiting)     SEVERE   • Seasonal allergies        Past Surgical History:   Procedure Laterality Date   • APPENDECTOMY     • CARDIAC CATHETERIZATION     • CARDIAC CATHETERIZATION Left 8/5/2021    Procedure: Cardiac Catheterization/Vascular Study;  Surgeon: Luke Dixon MD;  Location:  [facility] CATH INVASIVE LOCATION;  Service: Cardiology;  Laterality: Left;   • COLONOSCOPY  11/16/2012    Normal. Repeat in 5 years. Dr. Tez Perdomo   • COLONOSCOPY N/A 11/21/2017    Procedure: COLONOSCOPY WITH ANESTHESIA;  Surgeon: Tez Perdomo MD;  Location: [facility] ENDOSCOPY;  Service:    • CYSTOSCOPY TRANSURETHRAL RESECTION OF PROSTATE N/A 8/30/2021    Procedure: CYSTOSCOPY TRANSURETHRAL RESECTION OF PROSTATE;  Surgeon: Ric Tobin MD;  Location: [facility] OR;  Service: Urology;  Laterality: N/A;   • MANDIBLE SURGERY     • PROSTATE BIOPSY     • PROSTATE BIOPSY N/A 3/19/2020    Procedure: PROSTATE ULTRASOUND BIOPSY MRI FUSION WITH URONAV;  Surgeon: Ric Tobin MD;  Location: [facility] OR;  Service: Urology;  Laterality: N/A;       Social History     Socioeconomic History   • Marital status:    Tobacco Use   • Smoking status: Never Smoker   • Smokeless tobacco: Never Used   Vaping Use   • Vaping Use: Never used   Substance and Sexual Activity   • Alcohol use: No   • Drug use: No   • Sexual activity: Defer       Family History   Problem Relation Age of Onset   • Heart disease Father    •  "No Known Problems Mother    • No Known Problems Brother    • No Known Problems Brother    • No Known Problems Sister    • No Known Problems Sister    • No Known Problems Sister    • No Known Problems Sister    • Stomach cancer Sister    • Cancer Sister 69   • Colon cancer Neg Hx    • Colon polyps Neg Hx        Objective    Temp 99.8 °F (37.7 °C)   Ht 180.3 cm (71\")   Wt 81.3 kg (179 lb 3.2 oz)   BMI 24.99 kg/m²     Physical Exam  Vitals reviewed.   Constitutional:       General: He is not in acute distress.     Appearance: Normal appearance. He is not ill-appearing.   HENT:      Head: Normocephalic and atraumatic.   Pulmonary:      Effort: Pulmonary effort is normal.   Genitourinary:     Comments: Right epididymis enlarged and firm hard to distinguish between testicle and epididymis there is pain and discomfort with examination.  The left side is unremarkable.  Neurological:      General: No focal deficit present.      Mental Status: He is alert and oriented to person, place, and time.   Psychiatric:         Mood and Affect: Mood normal.         Behavior: Behavior normal.       Scrotal ultrasound independent review:  I inspected the images of the ultrasound the left testicle and left side of the scrotum is essentially normal just minimal hydrocele.  The right testicle reveals no mass there is a right hydrocele but there is increased vascularity and enlargement of the right epididymis suspicious for epididymitis.    Assessment and Plan    Diagnoses and all orders for this visit:    1. Right epididymitis (Primary)  -     ciprofloxacin (Cipro) 500 MG tablet; Take 1 tablet by mouth 2 (Two) Times a Day for 14 days.  Dispense: 28 tablet; Refill: 0    2. Pain in right testicle  -     ciprofloxacin (Cipro) 500 MG tablet; Take 1 tablet by mouth 2 (Two) Times a Day for 14 days.  Dispense: 28 tablet; Refill: 0    1 day onset of right testicular swelling and/or pain physical exam and scrotal ultrasound findings consistent " with right epididymitis.  Can take over-the-counter pain medication Tylenol Extra Strength use good scrotal support ice elevate.  I started him on a course of Cipro.  Patient had TURP done 08/30/2021 I want him to return in 1 month for follow-up.  Should he develop fever chills or any worsening symptoms he is to contact us to let us know.  I did explain that the discomfort and swelling can persist for several weeks with epididymitis.

## 2021-11-08 RX ORDER — IRBESARTAN 300 MG/1
TABLET ORAL
Qty: 90 TABLET | Refills: 0 | Status: SHIPPED | OUTPATIENT
Start: 2021-11-08 | End: 2022-02-03

## 2021-11-19 NOTE — PROGRESS NOTES
Subjective    Mr. Edwards is 71 y.o. male    Chief Complaint: Right Epididymitis    History of Present Illness  Patient I saw 10/21/2021 here for follow-up he was having right testicular swelling and pain scrotal ultrasound revealed findings suspicious for right epididymitis he was treated with course of Cipro and anti-inflammatories.  No longer has any pain swelling or any discomfort.  Patient also status post TURP he has a good flow and stream he has intermittent sensation of incomplete emptying.     The following portions of the patient's history were reviewed and updated as appropriate: allergies, current medications, past family history, past medical history, past social history, past surgical history and problem list.    Review of Systems   Constitutional: Negative for chills and fever.   Gastrointestinal: Negative for abdominal pain, anal bleeding and blood in stool.   Genitourinary: Positive for testicular pain. Negative for decreased urine volume, difficulty urinating, dysuria, enuresis, flank pain, frequency, genital sores, hematuria, penile discharge, penile pain, penile swelling, scrotal swelling and urgency.         Current Outpatient Medications:   •  amLODIPine (NORVASC) 5 MG tablet, Take 1 tablet by mouth Daily., Disp: 30 tablet, Rfl: 11  •  aspirin (aspirin) 81 MG EC tablet, Take 1 tablet by mouth Daily., Disp: 30 tablet, Rfl: 11  •  cloNIDine (Catapres) 0.1 MG tablet, Take 1 tablet by mouth 2 (Two) Times a Day As Needed for High Blood Pressure (> 150/90). (Patient taking differently: Take 0.1 mg by mouth Daily.), Disp: 60 tablet, Rfl: 11  •  Contour Next Test test strip, USE ONE EVERY DAY, Disp: 100 each, Rfl: 3  •  fluticasone (FLONASE) 50 MCG/ACT nasal spray, 2 sprays by Each Nare route 2 (Two) Times a Day., Disp: 16 g, Rfl: 3  •  ipratropium (ATROVENT) 0.06 % nasal spray, 2 sprays into the nostril(s) as directed by provider 4 (Four) Times a Day As Needed for Rhinitis. For drainage, Disp: 45  mL, Rfl: 3  •  irbesartan (AVAPRO) 300 MG tablet, TAKE 1 TABLET BY MOUTH DAILY, Disp: 90 tablet, Rfl: 0  •  Lancets Ultra Thin misc, 1 (ONE) MISC DAILY, Disp: 100 each, Rfl: 3  •  nitroglycerin (NITROSTAT) 0.4 MG SL tablet, 1 under the tongue as needed for angina, may repeat q5mins for up three doses, Disp: 25 tablet, Rfl: 11  •  rosuvastatin (CRESTOR) 5 MG tablet, Take 1 tablet by mouth Daily., Disp: 30 tablet, Rfl: 11    Past Medical History:   Diagnosis Date   • Abnormal PSA    • Chest pain    • Diabetes mellitus (HCC)     diet controlled   • Elevated cholesterol    • Hypertension    • PONV (postoperative nausea and vomiting)     SEVERE   • Seasonal allergies        Past Surgical History:   Procedure Laterality Date   • APPENDECTOMY     • CARDIAC CATHETERIZATION     • CARDIAC CATHETERIZATION Left 8/5/2021    Procedure: Cardiac Catheterization/Vascular Study;  Surgeon: Luke Dixon MD;  Location: [facility] CATH INVASIVE LOCATION;  Service: Cardiology;  Laterality: Left;   • COLONOSCOPY  11/16/2012    Normal. Repeat in 5 years. Dr. Tez Perdomo   • COLONOSCOPY N/A 11/21/2017    Procedure: COLONOSCOPY WITH ANESTHESIA;  Surgeon: Tez Perdomo MD;  Location: [facility] ENDOSCOPY;  Service:    • CYSTOSCOPY TRANSURETHRAL RESECTION OF PROSTATE N/A 8/30/2021    Procedure: CYSTOSCOPY TRANSURETHRAL RESECTION OF PROSTATE;  Surgeon: Ric Tobin MD;  Location: [facility] OR;  Service: Urology;  Laterality: N/A;   • MANDIBLE SURGERY     • PROSTATE BIOPSY     • PROSTATE BIOPSY N/A 3/19/2020    Procedure: PROSTATE ULTRASOUND BIOPSY MRI FUSION WITH URONAV;  Surgeon: Ric Tobin MD;  Location: [facility] OR;  Service: Urology;  Laterality: N/A;       Social History     Socioeconomic History   • Marital status:    Tobacco Use   • Smoking status: Never Smoker   • Smokeless tobacco: Never Used   Vaping Use   • Vaping Use: Never used   Substance and Sexual Activity   • Alcohol use: No   • Drug use: No   • Sexual  "activity: Defer       Family History   Problem Relation Age of Onset   • Heart disease Father    • No Known Problems Mother    • No Known Problems Brother    • No Known Problems Brother    • No Known Problems Sister    • No Known Problems Sister    • No Known Problems Sister    • No Known Problems Sister    • Stomach cancer Sister    • Cancer Sister 69   • Colon cancer Neg Hx    • Colon polyps Neg Hx        Objective    Temp 98.3 °F (36.8 °C)   Ht 180.3 cm (70.98\")   Wt 89.8 kg (198 lb)   BMI 27.63 kg/m²     Physical Exam  Vitals reviewed.   Constitutional:       Appearance: Normal appearance.   HENT:      Head: Normocephalic and atraumatic.      Right Ear: External ear normal.      Left Ear: External ear normal.   Pulmonary:      Effort: Pulmonary effort is normal.   Genitourinary:     Comments: External genitalia exam normal no tenderness with palpation no swelling.  Skin:     General: Skin is warm and dry.   Neurological:      General: No focal deficit present.      Mental Status: He is alert and oriented to person, place, and time.   Psychiatric:         Mood and Affect: Mood normal.         Behavior: Behavior normal.             Results for orders placed or performed in visit on 11/23/21   POC Urinalysis Dipstick, Multipro    Specimen: Urine   Result Value Ref Range    Color Yellow Yellow, Straw, Dark Yellow, Clarissa    Clarity, UA Clear Clear    Glucose, UA Negative Negative, 1000 mg/dL (3+) mg/dL    Bilirubin Negative Negative    Ketones, UA Negative Negative    Specific Gravity  1.025 1.005 - 1.030    Blood, UA Trace (A) Negative    pH, Urine 6.0 5.0 - 8.0    Protein, POC Negative Negative mg/dL    Urobilinogen, UA Normal Normal    Nitrite, UA Negative Negative    Leukocytes Negative Negative   International Prostate Symptom Score  The following is posted based on patient questionnaire answers:  0 - not at all    1-7 mild symptoms  1- Less than one time in five  8-19 moderate symptoms  2 -Less than half the " time  20-35 severe symptoms  3 - About half the time  4 - More than half the time  5 - Almost always     For following sections:  Incomplete Emptying: - How often have you had the sensation  of not emptying your bladder completely after you finished urinating?  2Frequency: -How often have you had to urinate again less than   two hours after you finished urinating?      3  Intermittency: -How often have you found you stopped and started again  Several times when you urinate?       1  Urgency: -How often do you find it difficult to postpone urination?             1  Weak stream: - How often have you had a weak urinary stream?             2Straining: - How often have you had to push or strain to begin  Urination?          0Sleeping: -How many times did you most typically get up to urinate   From the time you went to bed at night until the time you got up in the   1  Morning          Total `  10    Quality of Life  How would you feel if you had to live with your urinary condition the way   2It is now, no better, no worse for the rest of your life?    Where: 0=delighted; 1= pleased, 2= mostly satisfied, 3= mixed, 4 = mostly  Dissatisfied, 5= Unhappy, 6 = terrible    Bladder scan:  20 ml  Assessment and Plan    Diagnoses and all orders for this visit:    1. Right epididymitis (Primary)  -     POC Urinalysis Dipstick, Multipro    2. BPH with urinary obstruction    Regarding the epididymitis symptoms have resolved post antibiotic anti-inflammatories and Tylenol.  Genitalia exam was normal.    Patient status post TURP August 2021 overall symptoms are improved from presurgery he was asking about retrograde ejaculation with this is harmful I said it is not harmful condition will cause no long-term health problems and is a expected side effect of having TURP.    Follow-up in 6 months.

## 2021-11-23 ENCOUNTER — OFFICE VISIT (OUTPATIENT)
Dept: UROLOGY | Facility: CLINIC | Age: 71
End: 2021-11-23

## 2021-11-23 VITALS — WEIGHT: 198 LBS | BODY MASS INDEX: 27.72 KG/M2 | TEMPERATURE: 98.3 F | HEIGHT: 71 IN

## 2021-11-23 DIAGNOSIS — N13.8 BPH WITH URINARY OBSTRUCTION: ICD-10-CM

## 2021-11-23 DIAGNOSIS — N45.1 RIGHT EPIDIDYMITIS: Primary | ICD-10-CM

## 2021-11-23 DIAGNOSIS — N40.1 BPH WITH URINARY OBSTRUCTION: ICD-10-CM

## 2021-11-23 LAB
BILIRUB BLD-MCNC: NEGATIVE MG/DL
CLARITY, POC: CLEAR
COLOR UR: YELLOW
GLUCOSE UR STRIP-MCNC: NEGATIVE MG/DL
KETONES UR QL: NEGATIVE
LEUKOCYTE EST, POC: NEGATIVE
NITRITE UR-MCNC: NEGATIVE MG/ML
PH UR: 6 [PH] (ref 5–8)
PROT UR STRIP-MCNC: NEGATIVE MG/DL
RBC # UR STRIP: ABNORMAL /UL
SP GR UR: 1.02 (ref 1–1.03)
UROBILINOGEN UR QL: NORMAL

## 2021-11-23 PROCEDURE — 81001 URINALYSIS AUTO W/SCOPE: CPT | Performed by: PHYSICIAN ASSISTANT

## 2021-11-23 PROCEDURE — 99213 OFFICE O/P EST LOW 20 MIN: CPT | Performed by: PHYSICIAN ASSISTANT

## 2021-12-01 ENCOUNTER — OFFICE VISIT (OUTPATIENT)
Dept: UROLOGY | Facility: CLINIC | Age: 71
End: 2021-12-01

## 2021-12-01 VITALS — TEMPERATURE: 97.6 F | HEIGHT: 71 IN | WEIGHT: 183 LBS | BODY MASS INDEX: 25.62 KG/M2

## 2021-12-01 DIAGNOSIS — N40.1 BPH WITH URINARY OBSTRUCTION: Primary | ICD-10-CM

## 2021-12-01 DIAGNOSIS — R97.20 ELEVATED PROSTATE SPECIFIC ANTIGEN (PSA): ICD-10-CM

## 2021-12-01 DIAGNOSIS — N13.8 BPH WITH URINARY OBSTRUCTION: Primary | ICD-10-CM

## 2021-12-01 LAB
BILIRUB BLD-MCNC: NEGATIVE MG/DL
CLARITY, POC: CLEAR
COLOR UR: YELLOW
GLUCOSE UR STRIP-MCNC: NEGATIVE MG/DL
KETONES UR QL: NEGATIVE
LEUKOCYTE EST, POC: NEGATIVE
NITRITE UR-MCNC: NEGATIVE MG/ML
PH UR: 6 [PH] (ref 5–8)
PROT UR STRIP-MCNC: NEGATIVE MG/DL
RBC # UR STRIP: ABNORMAL /UL
SP GR UR: 1.02 (ref 1–1.03)
UROBILINOGEN UR QL: ABNORMAL

## 2021-12-01 PROCEDURE — 81001 URINALYSIS AUTO W/SCOPE: CPT | Performed by: UROLOGY

## 2021-12-01 PROCEDURE — 99213 OFFICE O/P EST LOW 20 MIN: CPT | Performed by: UROLOGY

## 2022-01-17 RX ORDER — FLUTICASONE PROPIONATE 50 MCG
SPRAY, SUSPENSION (ML) NASAL
Qty: 16 G | Refills: 3 | Status: SHIPPED | OUTPATIENT
Start: 2022-01-17 | End: 2023-01-19

## 2022-02-03 RX ORDER — IRBESARTAN 300 MG/1
TABLET ORAL
Qty: 90 TABLET | Refills: 0 | Status: SHIPPED | OUTPATIENT
Start: 2022-02-03 | End: 2022-08-03

## 2022-02-16 ENCOUNTER — OFFICE VISIT (OUTPATIENT)
Dept: CARDIOLOGY | Facility: CLINIC | Age: 72
End: 2022-02-16

## 2022-02-16 VITALS
SYSTOLIC BLOOD PRESSURE: 124 MMHG | WEIGHT: 190 LBS | BODY MASS INDEX: 26.6 KG/M2 | HEIGHT: 71 IN | DIASTOLIC BLOOD PRESSURE: 70 MMHG

## 2022-02-16 DIAGNOSIS — R94.31 ABNORMAL ECG: ICD-10-CM

## 2022-02-16 DIAGNOSIS — E78.5 HYPERLIPIDEMIA LDL GOAL <70: ICD-10-CM

## 2022-02-16 DIAGNOSIS — E11.9 DIET-CONTROLLED DIABETES MELLITUS: ICD-10-CM

## 2022-02-16 DIAGNOSIS — I10 BENIGN HYPERTENSION: ICD-10-CM

## 2022-02-16 DIAGNOSIS — I25.10 NON-OCCLUSIVE CORONARY ARTERY DISEASE: Primary | ICD-10-CM

## 2022-02-16 PROCEDURE — 93000 ELECTROCARDIOGRAM COMPLETE: CPT | Performed by: INTERNAL MEDICINE

## 2022-02-16 PROCEDURE — 99214 OFFICE O/P EST MOD 30 MIN: CPT | Performed by: INTERNAL MEDICINE

## 2022-02-16 NOTE — PROGRESS NOTES
Geoff Edwards  [id number]  1950  71 y.o.  male    Referring Provider: Heath Flores MD    Reason for  Visit: Here for routine follow up   coronary artery disease non obstructive     Subjective    Overall feeling well   No chest pain or shortness of breath     No palpitations  No significant pedal edema    Compliant with medications and dietary advice  Good effort tolerance    No presyncope or syncope  Compliant with medications    Tolerating current medications well with no untoward side effects   Compliant with prescribed medication regimen. Tries to adhere to cardiac diet.      Not checking blood pressures regularly at home     BP well controlled today     No bleeding, excessive bruising, gait instability or fall risks         Excellent effort tolerance with no cardiovascular limitations and at the patient's baseline   Lipids as below   Triglycerides   0 - 150 mg/dL 78  71    HDL Cholesterol   40 - 60 mg/dL 52  63 High     VLDL Cholesterol Juan Luis   5 - 40 mg/dL 15  14    LDL Chol Calc (NIH)   0 - 100 mg/dL 61  84    [facility]ing [facility]             Narrative              History of present illness:  Geoff Edwards is a 71 y.o. yo male with coronary artery disease  who presents today for   Chief Complaint   Patient presents with   • Follow-up   .    History  Past Medical History:   Diagnosis Date   • Abnormal PSA    • Chest pain    • Diabetes mellitus (HCC)     diet controlled   • Elevated cholesterol    • Hypertension    • PONV (postoperative nausea and vomiting)     SEVERE   • Seasonal allergies    ,   Past Surgical History:   Procedure Laterality Date   • APPENDECTOMY     • CARDIAC CATHETERIZATION     • CARDIAC CATHETERIZATION Left 8/5/2021    Procedure: Cardiac Catheterization/Vascular Study;  Surgeon: Luke Dixon MD;  Location:  [facility] CATH INVASIVE LOCATION;  Service: Cardiology;  Laterality: Left;   • COLONOSCOPY  11/16/2012    Normal. Repeat in 5 years. Dr. Tez Perdomo   •  COLONOSCOPY N/A 11/21/2017    Procedure: COLONOSCOPY WITH ANESTHESIA;  Surgeon: Tez Perdomo MD;  Location: [facility] ENDOSCOPY;  Service:    • CYSTOSCOPY TRANSURETHRAL RESECTION OF PROSTATE N/A 8/30/2021    Procedure: CYSTOSCOPY TRANSURETHRAL RESECTION OF PROSTATE;  Surgeon: Ric Tobin MD;  Location:  [facility] OR;  Service: Urology;  Laterality: N/A;   • MANDIBLE SURGERY     • PROSTATE BIOPSY     • PROSTATE BIOPSY N/A 3/19/2020    Procedure: PROSTATE ULTRASOUND BIOPSY MRI FUSION WITH URONAV;  Surgeon: Ric Tobin MD;  Location:  [facility] OR;  Service: Urology;  Laterality: N/A;   ,   Family History   Problem Relation Age of Onset   • Heart disease Father    • No Known Problems Mother    • No Known Problems Brother    • No Known Problems Brother    • No Known Problems Sister    • No Known Problems Sister    • No Known Problems Sister    • No Known Problems Sister    • Stomach cancer Sister    • Cancer Sister 69   • Colon cancer Neg Hx    • Colon polyps Neg Hx    ,   Social History     Tobacco Use   • Smoking status: Never Smoker   • Smokeless tobacco: Never Used   Vaping Use   • Vaping Use: Never used   Substance Use Topics   • Alcohol use: No   • Drug use: No   ,     Medications  Current Outpatient Medications   Medication Sig Dispense Refill   • amLODIPine (NORVASC) 5 MG tablet Take 1 tablet by mouth Daily. 30 tablet 11   • aspirin (aspirin) 81 MG EC tablet Take 1 tablet by mouth Daily. 30 tablet 11   • cloNIDine (Catapres) 0.1 MG tablet Take 1 tablet by mouth 2 (Two) Times a Day As Needed for High Blood Pressure (> 150/90). (Patient taking differently: Take 0.1 mg by mouth Daily.) 60 tablet 11   • Contour Next Test test strip USE ONE EVERY  each 3   • fluticasone (FLONASE) 50 MCG/ACT nasal spray USE 2 SPRAYS IN EACH NOSTRIL TWO TIMES A DAY 16 g 3   • ipratropium (ATROVENT) 0.06 % nasal spray 2 sprays into the nostril(s) as directed by provider 4 (Four) Times a Day As Needed for Rhinitis.  "For drainage 45 mL 3   • irbesartan (AVAPRO) 300 MG tablet TAKE 1 TABLET BY MOUTH DAILY 90 tablet 0   • Lancets Ultra Thin misc 1 (ONE) MISC DAILY 100 each 3   • nitroglycerin (NITROSTAT) 0.4 MG SL tablet 1 under the tongue as needed for angina, may repeat q5mins for up three doses 25 tablet 11   • rosuvastatin (CRESTOR) 5 MG tablet Take 1 tablet by mouth Daily. 30 tablet 11     No current facility-administered medications for this visit.       Allergies:  Ace inhibitors    Review of Systems  Review of Systems   Constitutional: Negative.   HENT: Negative.    Eyes: Negative.    Cardiovascular: Negative for chest pain, claudication, cyanosis, dyspnea on exertion, irregular heartbeat, leg swelling, near-syncope, orthopnea, palpitations, paroxysmal nocturnal dyspnea and syncope.   Respiratory: Negative.    Endocrine: Negative.    Hematologic/Lymphatic: Negative.    Skin: Negative.    Gastrointestinal: Negative for anorexia.   Genitourinary: Negative.    Neurological: Negative.    Psychiatric/Behavioral: Negative.        Objective     Physical Exam:  /70   Ht 180.3 cm (71\")   Wt 86.2 kg (190 lb)   BMI 26.50 kg/m²     Physical Exam  Constitutional:       Appearance: He is well-developed.   HENT:      Head: Normocephalic.   Neck:      Vascular: Normal carotid pulses. No carotid bruit or JVD.      Trachea: No tracheal tenderness or tracheal deviation.   Cardiovascular:      Rate and Rhythm: Regular rhythm.      Pulses: Normal pulses.      Heart sounds: Normal heart sounds.   Pulmonary:      Effort: Pulmonary effort is normal.      Breath sounds: No stridor.   Abdominal:      General: There is no distension.      Palpations: Abdomen is soft.      Tenderness: There is no abdominal tenderness.   Musculoskeletal:      Cervical back: No edema.   Skin:     General: Skin is warm.   Neurological:      Mental Status: He is alert.      Cranial Nerves: No cranial nerve deficit.      Sensory: No sensory deficit.   Psychiatric: "         Speech: Speech normal.         Behavior: Behavior normal.         Results Review:      Results for orders placed during the hospital encounter of 08/05/21    Adult Stress Echo W/ Cont or Stress Agent if Necessary Per Protocol    Interpretation Summary  · Estimated left ventricular EF = 55% Left ventricular systolic function is normal.  · Abnormal stress echo with echocardiographic evidence for myocardial ischemia located in the anterior wall and apical wall.        ____________________________________________________________________________________________________________________________________________  Health maintenance and recommendations    Low salt/ HTN/ Heart healthy carbohydrate restricted cardiac diet   The patient is advised to reduce or avoid caffeine or other cardiac stimulants.   Minimize or avoid  NSAID-type medications      Monitor for any signs of bleeding including red or dark stools. Fall precautions.   Advised staying uptodate with immunizations per established standard guidelines.    Offered to give patient  a copy of my notes     Questions were encouraged, asked and answered to the patient's  understanding and satisfaction. Questions if any regarding current medications and side effects, need for refills and importance of compliance to medications stressed.    Reviewed available prior notes, consults, prior visits, laboratory findings, radiology and cardiology relevant reports. Updated chart as applicable. I have reviewed the patient's medical history in detail and updated the computerized patient record as relevant.      Updated patient regarding any new or relevant abnormalities on review of records or any new findings on physical exam. Mentioned to patient about purpose of visit and desirable health short and long term goals and objectives.    Primary to monitor CBC CMP Lipid panel and TSH as  applicable    ___________________________________________________________________________________________________________________________________________     ECG 12 Lead    Date/Time: 2/16/2022 8:17 AM  Performed by: Luke Dixon MD  Authorized by: Luke Dixon MD   Comparison: compared with previous ECG from 8/5/2021  Comparison to previous ECG: Ventricular rate changed from 51   to 58 beats per minute    Rhythm: sinus bradycardia  Rate: bradycardic  QRS axis: normal  Other findings: non-specific ST-T wave changes and poor R wave progression    Clinical impression: abnormal EKG            Assessment/Plan   Diagnoses and all orders for this visit:    1. Non-occlusive coronary artery disease (Primary)    2. Hyperlipidemia LDL goal <70    3. Diet-controlled diabetes mellitus (HCC)    4. Benign hypertension    5. Abnormal ECG  -     Adult Transthoracic Echo Complete w/ Color, Spectral and Contrast if necessary per protocol; Future    6. Abnormal ECG Anterior infarct   -     Adult Transthoracic Echo Complete w/ Color, Spectral and Contrast if necessary per protocol; Future    Other orders  -     ECG 12 Lead          Plan    Orders Placed This Encounter   Procedures   • ECG 12 Lead     This order was created via procedure documentation     Order Specific Question:   Release to patient     Answer:   Immediate   • Adult Transthoracic Echo Complete w/ Color, Spectral and Contrast if necessary per protocol     Standing Status:   Future     Standing Expiration Date:   2/16/2023     Scheduling Instructions:      Myocardial strain to be performed during echocardiogram as long as technically feasible     Order Specific Question:   Reason for exam?     Answer:   Heart Failure, Cardiomyopathy, or Sytemic or Pulmonary Hypertension     Order Specific Question:   Release to patient     Answer:   Immediate        Check BP and heart rates twice daily initially till blood pressures and heart rates under good control and then at least  3x / week,   If blood pressures continue to be well-controlled then can check week a month  at home and bring a recording for review next visit  If BP >130/85 or < 100/60 persistently over 3 reading 30 mins apart or if heart rates persistently above 100 bpm or less than 55 bpm call sooner for evaluation and advise     Monitor for any signs of bleeding including red or dark stools as well as easy bruisabilty. Fall precautions.       Keep A1c less than 7 Primary to monitor  Keep LDL below 70 mg/dl. Monitor liver and renal functions.   Monitor CBC, CMP, TSH (as indicated) and Lipid Panel by primary      I support the patient's decision to take the Covid -19 vaccine   Had required complete course   No major issues   Now fully immunized    Had booster too      Follow up with Verónica WHITTEN , Erick WHITTEN  or myself            Return in about 6 months (around 8/16/2022).

## 2022-03-02 ENCOUNTER — HOSPITAL ENCOUNTER (OUTPATIENT)
Dept: CARDIOLOGY | Facility: HOSPITAL | Age: 72
Discharge: HOME | End: 2022-03-02
Admitting: INTERNAL MEDICINE

## 2022-03-02 VITALS
BODY MASS INDEX: 26.6 KG/M2 | DIASTOLIC BLOOD PRESSURE: 70 MMHG | HEIGHT: 71 IN | SYSTOLIC BLOOD PRESSURE: 124 MMHG | WEIGHT: 190 LBS

## 2022-03-02 DIAGNOSIS — R94.31 ABNORMAL ECG: ICD-10-CM

## 2022-03-02 PROCEDURE — 93306 TTE W/DOPPLER COMPLETE: CPT | Performed by: INTERNAL MEDICINE

## 2022-03-02 PROCEDURE — 93306 TTE W/DOPPLER COMPLETE: CPT

## 2022-03-02 PROCEDURE — 93356 MYOCRD STRAIN IMG SPCKL TRCK: CPT | Performed by: INTERNAL MEDICINE

## 2022-03-02 PROCEDURE — 93356 MYOCRD STRAIN IMG SPCKL TRCK: CPT

## 2022-03-05 LAB
BH CV ECHO LEFT VENTRICLE GLOBAL LONGITUDINAL STRAIN: -15 %
BH CV ECHO MEAS - AO MAX PG (FULL): 0.98 MMHG
BH CV ECHO MEAS - AO MAX PG: 4.5 MMHG
BH CV ECHO MEAS - AO MEAN PG (FULL): 0 MMHG
BH CV ECHO MEAS - AO MEAN PG: 2 MMHG
BH CV ECHO MEAS - AO ROOT AREA (BSA CORRECTED): 1.5
BH CV ECHO MEAS - AO ROOT AREA: 7.5 CM^2
BH CV ECHO MEAS - AO ROOT DIAM: 3.1 CM
BH CV ECHO MEAS - AO V2 MAX: 106 CM/SEC
BH CV ECHO MEAS - AO V2 MEAN: 69.6 CM/SEC
BH CV ECHO MEAS - AO V2 VTI: 23.2 CM
BH CV ECHO MEAS - AVA(I,A): 2.9 CM^2
BH CV ECHO MEAS - AVA(I,D): 2.9 CM^2
BH CV ECHO MEAS - AVA(V,A): 2.8 CM^2
BH CV ECHO MEAS - AVA(V,D): 2.8 CM^2
BH CV ECHO MEAS - BSA(HAYCOCK): 2.1 M^2
BH CV ECHO MEAS - BSA: 2.1 M^2
BH CV ECHO MEAS - BZI_BMI: 26.5 KILOGRAMS/M^2
BH CV ECHO MEAS - BZI_METRIC_HEIGHT: 180.3 CM
BH CV ECHO MEAS - BZI_METRIC_WEIGHT: 86.2 KG
BH CV ECHO MEAS - EDV(CUBED): 133.4 ML
BH CV ECHO MEAS - EDV(MOD-SP4): 104 ML
BH CV ECHO MEAS - EDV(TEICH): 124.4 ML
BH CV ECHO MEAS - EF(CUBED): 70.8 %
BH CV ECHO MEAS - EF(MOD-SP4): 65.4 %
BH CV ECHO MEAS - EF(TEICH): 62.1 %
BH CV ECHO MEAS - ESV(CUBED): 39 ML
BH CV ECHO MEAS - ESV(MOD-SP4): 36 ML
BH CV ECHO MEAS - ESV(TEICH): 47.1 ML
BH CV ECHO MEAS - FS: 33.7 %
BH CV ECHO MEAS - IVS/LVPW: 1
BH CV ECHO MEAS - IVSD: 1.2 CM
BH CV ECHO MEAS - LA DIMENSION: 4.2 CM
BH CV ECHO MEAS - LA/AO: 1.4
BH CV ECHO MEAS - LAT PEAK E' VEL: 8.6 CM/SEC
BH CV ECHO MEAS - LV DIASTOLIC VOL/BSA (35-75): 50.4 ML/M^2
BH CV ECHO MEAS - LV MASS(C)D: 236.4 GRAMS
BH CV ECHO MEAS - LV MASS(C)DI: 114.6 GRAMS/M^2
BH CV ECHO MEAS - LV MAX PG: 3.5 MMHG
BH CV ECHO MEAS - LV MEAN PG: 2 MMHG
BH CV ECHO MEAS - LV SYSTOLIC VOL/BSA (12-30): 17.4 ML/M^2
BH CV ECHO MEAS - LV V1 MAX: 93.8 CM/SEC
BH CV ECHO MEAS - LV V1 MEAN: 58.2 CM/SEC
BH CV ECHO MEAS - LV V1 VTI: 21.4 CM
BH CV ECHO MEAS - LVIDD: 5.1 CM
BH CV ECHO MEAS - LVIDS: 3.4 CM
BH CV ECHO MEAS - LVLD AP4: 7.9 CM
BH CV ECHO MEAS - LVLS AP4: 6.3 CM
BH CV ECHO MEAS - LVOT AREA (M): 3.1 CM^2
BH CV ECHO MEAS - LVOT AREA: 3.1 CM^2
BH CV ECHO MEAS - LVOT DIAM: 2 CM
BH CV ECHO MEAS - LVPWD: 1.2 CM
BH CV ECHO MEAS - MED PEAK E' VEL: 5.87 CM/SEC
BH CV ECHO MEAS - MR MAX PG: 75 MMHG
BH CV ECHO MEAS - MR MAX VEL: 433 CM/SEC
BH CV ECHO MEAS - MR MEAN PG: 55 MMHG
BH CV ECHO MEAS - MR MEAN VEL: 353 CM/SEC
BH CV ECHO MEAS - MR VTI: 154 CM
BH CV ECHO MEAS - MV A MAX VEL: 56.8 CM/SEC
BH CV ECHO MEAS - MV DEC SLOPE: 304 CM/SEC^2
BH CV ECHO MEAS - MV DEC TIME: 0.22 SEC
BH CV ECHO MEAS - MV E MAX VEL: 60.9 CM/SEC
BH CV ECHO MEAS - MV E/A: 1.1
BH CV ECHO MEAS - MV P1/2T MAX VEL: 74.9 CM/SEC
BH CV ECHO MEAS - MV P1/2T: 72.2 MSEC
BH CV ECHO MEAS - MVA P1/2T LCG: 2.9 CM^2
BH CV ECHO MEAS - MVA(P1/2T): 3 CM^2
BH CV ECHO MEAS - PI END-D VEL: 52.8 CM/SEC
BH CV ECHO MEAS - RAP SYSTOLE: 5 MMHG
BH CV ECHO MEAS - RVDD: 3.9 CM
BH CV ECHO MEAS - RVSP: 25.6 MMHG
BH CV ECHO MEAS - SI(AO): 84.9 ML/M^2
BH CV ECHO MEAS - SI(CUBED): 45.8 ML/M^2
BH CV ECHO MEAS - SI(LVOT): 32.6 ML/M^2
BH CV ECHO MEAS - SI(MOD-SP4): 33 ML/M^2
BH CV ECHO MEAS - SI(TEICH): 37.4 ML/M^2
BH CV ECHO MEAS - SV(AO): 175.1 ML
BH CV ECHO MEAS - SV(CUBED): 94.5 ML
BH CV ECHO MEAS - SV(LVOT): 67.2 ML
BH CV ECHO MEAS - SV(MOD-SP4): 68 ML
BH CV ECHO MEAS - SV(TEICH): 77.3 ML
BH CV ECHO MEAS - TR MAX VEL: 227 CM/SEC
BH CV ECHO MEASUREMENTS AVERAGE E/E' RATIO: 8.42
LEFT ATRIUM VOLUME INDEX: 25.3 ML/M2
LEFT ATRIUM VOLUME: 52.2 CM3

## 2022-03-09 ENCOUNTER — TELEPHONE (OUTPATIENT)
Dept: CARDIOLOGY | Facility: CLINIC | Age: 72
End: 2022-03-09

## 2022-03-09 NOTE — TELEPHONE ENCOUNTER
PT CALLED FOR ECHO RESULTS - REPORT READ TO PT - PLEASE ADVISE ON GLS - 15.0% & MILD CONCENTRIC HYPERTROPHY

## 2022-04-07 ENCOUNTER — OFFICE VISIT (OUTPATIENT)
Dept: INTERNAL MEDICINE | Facility: CLINIC | Age: 72
End: 2022-04-07

## 2022-04-07 VITALS
HEIGHT: 71 IN | BODY MASS INDEX: 26.18 KG/M2 | HEART RATE: 65 BPM | WEIGHT: 187 LBS | OXYGEN SATURATION: 98 % | DIASTOLIC BLOOD PRESSURE: 70 MMHG | SYSTOLIC BLOOD PRESSURE: 132 MMHG | TEMPERATURE: 97.3 F

## 2022-04-07 DIAGNOSIS — Z00.00 ENCOUNTER FOR SUBSEQUENT ANNUAL WELLNESS VISIT (AWV) IN MEDICARE PATIENT: Primary | ICD-10-CM

## 2022-04-07 DIAGNOSIS — E78.5 HYPERLIPIDEMIA LDL GOAL <70: ICD-10-CM

## 2022-04-07 DIAGNOSIS — E11.9 DIET-CONTROLLED DIABETES MELLITUS: ICD-10-CM

## 2022-04-07 DIAGNOSIS — I10 BENIGN HYPERTENSION: ICD-10-CM

## 2022-04-07 PROCEDURE — G0439 PPPS, SUBSEQ VISIT: HCPCS | Performed by: FAMILY MEDICINE

## 2022-04-07 PROCEDURE — 3044F HG A1C LEVEL LT 7.0%: CPT | Performed by: FAMILY MEDICINE

## 2022-04-07 PROCEDURE — 1126F AMNT PAIN NOTED NONE PRSNT: CPT | Performed by: FAMILY MEDICINE

## 2022-04-07 PROCEDURE — 99214 OFFICE O/P EST MOD 30 MIN: CPT | Performed by: FAMILY MEDICINE

## 2022-04-07 PROCEDURE — 1159F MED LIST DOCD IN RCRD: CPT | Performed by: FAMILY MEDICINE

## 2022-04-07 PROCEDURE — 1170F FXNL STATUS ASSESSED: CPT | Performed by: FAMILY MEDICINE

## 2022-04-07 PROCEDURE — 83036 HEMOGLOBIN GLYCOSYLATED A1C: CPT | Performed by: FAMILY MEDICINE

## 2022-04-07 NOTE — PROGRESS NOTES
The ABCs of the Annual Wellness Visit  Subsequent Medicare Wellness Visit    Chief Complaint   Patient presents with   • Medicare Wellness-subsequent   • Diabetes     A1C-6.2      Subjective    History of Present Illness:    The patient verbally consented to being recorded.   Geoff Edwards is a 71 y.o. male who presents for a Subsequent Medicare Wellness Visit.    Diabetes mellitus type 2  The patient's A1c today is 6.2 percent. Consistent with last A1c of 6.2 percent. Previous chemistry was completed in 08/2021 with results of adequate renal function. Previous lab results for liver function and cholesterol was within normal limits. The patient denies numbness in his feet. He states the dark spots on his feet are normal and he has not had any changes or concerns.     Transurethral prostatic resection  The patient notes he was hospitalized in the last year for a transurethral prostatic resection at [facility]. He denies any issues with urinating at this time. The patient has established care with Dr. Weinstein in urology. He will follow-up in 05/2022.    Living will  The patient denies having a living will. He states he believe he has the information on it but has not filled out the forms.     Health maintenance  The patient reports he walks for exercise. He gets an eye exam yearly.     Complete ROS reviewed and negative except as mentioned in the HPI    The following portions of the patient's history were reviewed and   updated as appropriate: allergies, current medications, past family history, past medical history, past social history, past surgical history and problem list.    Compared to one year ago, the patient feels his physical   health is the same.    Compared to one year ago, the patient feels his mental   health is the same.    Recent Hospitalizations:  He was admitted within the past 365 days at [facility]       Current Medical Providers:  Patient Care Team:  Carmelina Harrington DO as  PCP - General (Family Medicine)  Ric Tobin MD as Consulting Physician (Urology)  Fernando Solano Jr., MD as Consulting Physician (Otolaryngology)    Outpatient Medications Prior to Visit   Medication Sig Dispense Refill   • amLODIPine (NORVASC) 5 MG tablet Take 1 tablet by mouth Daily. 30 tablet 11   • aspirin (aspirin) 81 MG EC tablet Take 1 tablet by mouth Daily. 30 tablet 11   • cloNIDine (Catapres) 0.1 MG tablet Take 1 tablet by mouth 2 (Two) Times a Day As Needed for High Blood Pressure (> 150/90). (Patient taking differently: Take 0.1 mg by mouth Daily.) 60 tablet 11   • Contour Next Test test strip USE ONE EVERY  each 3   • fluticasone (FLONASE) 50 MCG/ACT nasal spray USE 2 SPRAYS IN EACH NOSTRIL TWO TIMES A DAY 16 g 3   • ipratropium (ATROVENT) 0.06 % nasal spray 2 sprays into the nostril(s) as directed by provider 4 (Four) Times a Day As Needed for Rhinitis. For drainage 45 mL 3   • irbesartan (AVAPRO) 300 MG tablet TAKE 1 TABLET BY MOUTH DAILY 90 tablet 0   • Lancets Ultra Thin misc 1 (ONE) MISC DAILY 100 each 3   • nitroglycerin (NITROSTAT) 0.4 MG SL tablet 1 under the tongue as needed for angina, may repeat q5mins for up three doses 25 tablet 11   • rosuvastatin (CRESTOR) 5 MG tablet Take 1 tablet by mouth Daily. 30 tablet 11     No facility-administered medications prior to visit.       No opioid medication identified on active medication list. I have reviewed chart for other potential  high risk medication/s and harmful drug interactions in the elderly.          Aspirin is on active medication list. Aspirin use is indicated based on review of current medical condition/s. Pros and cons of this therapy have been discussed today. Benefits of this medication outweigh potential harm.  Patient has been encouraged to continue taking this medication.  .      Patient Active Problem List   Diagnosis   • Encounter for screening for malignant neoplasm of colon   • Benign hypertension   •  "Shoulder strain, left, sequela   • Elevated prostate specific antigen (PSA)   • Diet-controlled diabetes mellitus (HCC)   • Rotator cuff strain, right, subsequent encounter   • Hip pain, chronic, right   • Baker's cyst of knee, right   • Non-occlusive coronary artery disease   • Hyperlipidemia LDL goal <70   • Urinary retention   • Abnormal ECG Anterior infarct      Advance Care Planning  Advance Directive is not on file.  ACP discussion was held with the patient during this visit. Patient does not have an advance directive, information provided.          Objective    Vitals:    04/07/22 0809   BP: 132/70   BP Location: Left arm   Patient Position: Sitting   Cuff Size: Adult   Pulse: 65   Temp: 97.3 °F (36.3 °C)   SpO2: 98%   Weight: 84.8 kg (187 lb)   Height: 180.3 cm (71\")   PainSc: 0-No pain     BMI Readings from Last 1 Encounters:   04/07/22 26.08 kg/m²   BMI is above normal parameters. Recommendations include: exercise counseling and nutrition counseling    Does the patient have evidence of cognitive impairment? No    Physical Exam  Vitals and nursing note reviewed.   Constitutional:       General: He is not in acute distress.     Appearance: Normal appearance. He is well-developed. He is not ill-appearing, toxic-appearing or diaphoretic.   HENT:      Head: Normocephalic and atraumatic.      Right Ear: External ear normal.      Left Ear: External ear normal.   Eyes:      Conjunctiva/sclera: Conjunctivae normal.      Pupils: Pupils are equal, round, and reactive to light.   Neck:      Thyroid: No thyromegaly.      Vascular: No carotid bruit.   Cardiovascular:      Rate and Rhythm: Normal rate and regular rhythm.      Pulses: Normal pulses.      Heart sounds: Normal heart sounds. No murmur heard.  Pulmonary:      Effort: Pulmonary effort is normal. No respiratory distress.      Breath sounds: Normal breath sounds.   Abdominal:      General: Bowel sounds are normal.      Palpations: Abdomen is soft. There is no " mass.      Tenderness: There is no abdominal tenderness.   Musculoskeletal:         General: No swelling. Normal range of motion.      Cervical back: Normal range of motion and neck supple.   Lymphadenopathy:      Cervical: No cervical adenopathy.   Skin:     General: Skin is warm and dry.      Findings: No lesion or rash.   Neurological:      Mental Status: He is alert and oriented to person, place, and time.      Cranial Nerves: No cranial nerve deficit.      Sensory: No sensory deficit.      Motor: No weakness.      Coordination: Coordination normal.      Gait: Gait normal.      Deep Tendon Reflexes: Reflexes are normal and symmetric.   Psychiatric:         Mood and Affect: Mood normal.         Behavior: Behavior normal.         Thought Content: Thought content normal.         Judgment: Judgment normal.                 HEALTH RISK ASSESSMENT    Smoking Status:  Social History     Tobacco Use   Smoking Status Never Smoker   Smokeless Tobacco Never Used     Alcohol Consumption:  Social History     Substance and Sexual Activity   Alcohol Use No     Fall Risk Screen:    STEADI Fall Risk Assessment was completed, and patient is at LOW risk for falls.Assessment completed on:4/7/2022    Depression Screening:  PHQ-2/PHQ-9 Depression Screening 4/7/2022   Retired Total Score -   Little Interest or Pleasure in Doing Things 0-->not at all   Feeling Down, Depressed or Hopeless 0-->not at all   PHQ-9: Brief Depression Severity Measure Score 0       Health Habits and Functional and Cognitive Screening:  Functional & Cognitive Status 4/7/2022   Do you have difficulty preparing food and eating? No   Do you have difficulty bathing yourself, getting dressed or grooming yourself? No   Do you have difficulty using the toilet? No   Do you have difficulty moving around from place to place? No   Do you have trouble with steps or getting out of a bed or a chair? No   Current Diet Well Balanced Diet   Dental Exam Up to date   Eye Exam Up  to date   Exercise (times per week) 4 times per week   Current Exercises Include Treadmill   Current Exercise Activities Include -   Do you need help using the phone?  No   Are you deaf or do you have serious difficulty hearing?  Yes   Do you need help with transportation? No   Do you need help shopping? No   Do you need help preparing meals?  No   Do you need help with housework?  No   Do you need help with laundry? No   Do you need help taking your medications? No   Do you need help managing money? No   Do you ever drive or ride in a car without wearing a seat belt? No   Have you felt unusual stress, anger or loneliness in the last month? No   Who do you live with? Spouse   If you need help, do you have trouble finding someone available to you? No   Have you been bothered in the last four weeks by sexual problems? No   Do you have difficulty concentrating, remembering or making decisions? No       Age-appropriate Screening Schedule:  Refer to the list below for future screening recommendations based on patient's age, sex and/or medical conditions. Orders for these recommended tests are listed in the plan section. The patient has been provided with a written plan.    Health Maintenance   Topic Date Due   • TDAP/TD VACCINES (1 - Tdap) Never done   • ZOSTER VACCINE (1 of 2) Never done   • DIABETIC FOOT EXAM  03/18/2022   • URINE MICROALBUMIN  03/18/2022   • HEMOGLOBIN A1C  03/29/2022   • INFLUENZA VACCINE  08/01/2022   • LIPID PANEL  09/29/2022   • DIABETIC EYE EXAM  12/22/2022              Assessment/Plan   CMS Preventative Services Quick Reference  Risk Factors Identified During Encounter  Obesity/Overweight   The above risks/problems have been discussed with the patient.  Follow up actions/plans if indicated are seen below in the Assessment/Plan Section.  Pertinent information has been shared with the patient in the After Visit Summary.    Diagnoses and all orders for this visit:    1. Encounter for subsequent  annual wellness visit (AWV) in Medicare patient (Primary)    2. Diet-controlled diabetes mellitus (HCC)  -     MicroAlbumin, Urine, Random - Urine, Clean Catch  -     POC Glycated Hemoglobin, Total    3. Hyperlipidemia LDL goal <70  -     Lipid Panel  -     TSH    4. Benign hypertension  -     Comprehensive Metabolic Panel  -     CBC & Differential  -     Uric Acid  -     Urinalysis With Microscopic If Indicated (No Culture) - Urine, Clean Catch        Follow Up:   Return in about 6 months (around 10/7/2022).     An After Visit Summary and PPPS were made available to the patient.

## 2022-04-09 LAB
ALBUMIN SERPL-MCNC: 4.1 G/DL (ref 3.5–5.2)
ALBUMIN/GLOB SERPL: 1.6 G/DL
ALP SERPL-CCNC: 79 U/L (ref 39–117)
ALT SERPL-CCNC: 21 U/L (ref 1–41)
APPEARANCE UR: CLEAR
AST SERPL-CCNC: 18 U/L (ref 1–40)
BASOPHILS # BLD AUTO: 0.08 10*3/MM3 (ref 0–0.2)
BASOPHILS NFR BLD AUTO: 1.3 % (ref 0–1.5)
BILIRUB SERPL-MCNC: 1.6 MG/DL (ref 0–1.2)
BILIRUB UR QL STRIP: NEGATIVE
BUN SERPL-MCNC: 17 MG/DL (ref 8–23)
BUN/CREAT SERPL: 14.2 (ref 7–25)
CALCIUM SERPL-MCNC: 9.1 MG/DL (ref 8.6–10.5)
CHLORIDE SERPL-SCNC: 110 MMOL/L (ref 98–107)
CHOLEST SERPL-MCNC: 128 MG/DL (ref 0–200)
CO2 SERPL-SCNC: 23.5 MMOL/L (ref 22–29)
COLOR UR: YELLOW
CREAT SERPL-MCNC: 1.2 MG/DL (ref 0.76–1.27)
EGFRCR SERPLBLD CKD-EPI 2021: 63.9 ML/MIN/1.73
EOSINOPHIL # BLD AUTO: 0.34 10*3/MM3 (ref 0–0.4)
EOSINOPHIL NFR BLD AUTO: 5.6 % (ref 0.3–6.2)
ERYTHROCYTE [DISTWIDTH] IN BLOOD BY AUTOMATED COUNT: 14 % (ref 12.3–15.4)
GLOBULIN SER CALC-MCNC: 2.5 GM/DL
GLUCOSE SERPL-MCNC: 116 MG/DL (ref 65–99)
GLUCOSE UR QL STRIP: NEGATIVE
HCT VFR BLD AUTO: 40.1 % (ref 37.5–51)
HDLC SERPL-MCNC: 57 MG/DL (ref 40–60)
HGB BLD-MCNC: 13.5 G/DL (ref 13–17.7)
HGB UR QL STRIP: NEGATIVE
IMM GRANULOCYTES # BLD AUTO: 0.01 10*3/MM3 (ref 0–0.05)
IMM GRANULOCYTES NFR BLD AUTO: 0.2 % (ref 0–0.5)
KETONES UR QL STRIP: ABNORMAL
LDLC SERPL CALC-MCNC: 60 MG/DL (ref 0–100)
LEUKOCYTE ESTERASE UR QL STRIP: NEGATIVE
LYMPHOCYTES # BLD AUTO: 2.01 10*3/MM3 (ref 0.7–3.1)
LYMPHOCYTES NFR BLD AUTO: 33 % (ref 19.6–45.3)
MCH RBC QN AUTO: 31.7 PG (ref 26.6–33)
MCHC RBC AUTO-ENTMCNC: 33.7 G/DL (ref 31.5–35.7)
MCV RBC AUTO: 94.1 FL (ref 79–97)
MICROALBUMIN UR-MCNC: 11.6 UG/ML
MONOCYTES # BLD AUTO: 0.54 10*3/MM3 (ref 0.1–0.9)
MONOCYTES NFR BLD AUTO: 8.9 % (ref 5–12)
NEUTROPHILS # BLD AUTO: 3.12 10*3/MM3 (ref 1.7–7)
NEUTROPHILS NFR BLD AUTO: 51 % (ref 42.7–76)
NITRITE UR QL STRIP: NEGATIVE
NRBC BLD AUTO-RTO: 0.2 /100 WBC (ref 0–0.2)
PH UR STRIP: 5.5 [PH] (ref 5–8)
PLATELET # BLD AUTO: 160 10*3/MM3 (ref 140–450)
POTASSIUM SERPL-SCNC: 4.2 MMOL/L (ref 3.5–5.2)
PROT SERPL-MCNC: 6.6 G/DL (ref 6–8.5)
PROT UR QL STRIP: NEGATIVE
RBC # BLD AUTO: 4.26 10*6/MM3 (ref 4.14–5.8)
SODIUM SERPL-SCNC: 145 MMOL/L (ref 136–145)
SP GR UR STRIP: 1.03 (ref 1–1.03)
TRIGL SERPL-MCNC: 50 MG/DL (ref 0–150)
TSH SERPL DL<=0.005 MIU/L-ACNC: 3.88 UIU/ML (ref 0.27–4.2)
URATE SERPL-MCNC: 3.9 MG/DL (ref 3.4–7)
UROBILINOGEN UR STRIP-MCNC: ABNORMAL MG/DL
VLDLC SERPL CALC-MCNC: 11 MG/DL (ref 5–40)
WBC # BLD AUTO: 6.1 10*3/MM3 (ref 3.4–10.8)

## 2022-04-20 DIAGNOSIS — I10 BENIGN HYPERTENSION: Primary | ICD-10-CM

## 2022-04-25 LAB — HBA1C MFR BLD: 6.2 %

## 2022-04-29 NOTE — TELEPHONE ENCOUNTER
"    Reason for Disposition  • [1] Constipation persists > 1 week AND [2] no improvement after using CARE ADVICE    Additional Information  • Negative: [1] Abdomen pain is main symptom AND [2] male  • Negative: [1] Abdomen pain is main symptom AND [2] adult female  • Negative: Rectal bleeding or blood in stool is main symptom  • Negative: Rectal pain or itching is main symptom  • Negative: Constipation in a cancer patient who is currently (or recently) receiving chemotherapy or radiation therapy, or cancer patient who has metastatic or end-stage cancer and is receiving palliative care  • Negative: Patient sounds very sick or weak to the triager  • Negative: [1] Vomiting AND [2] abdomen looks much more swollen than usual  • Negative: [1] Vomiting AND [2] contains bile (green color)  • Negative: [1] Constant abdominal pain AND [2] present > 2 hours  • Negative: [1] Rectal pain or fullness from fecal impaction (rectum full of stool) AND [2] NOT better after SITZ bath, suppository or enema  • Negative: [1] Intermittent mild abdominal pain AND [2] fever  • Negative: Abdomen is more swollen than usual  • Negative: Last bowel movement (BM) > 4 days ago  • Negative: Leaking stool  • Negative: Unable to have a bowel movement (BM) without manually removing stool (using finger to pull out stool or perform disimpaction)  • Negative: Unable to have a bowel movement (BM) without laxative or enema    Answer Assessment - Initial Assessment Questions  1. STOOL PATTERN OR FREQUENCY: \"How often do you pass bowel movements (BMs)?\"  (Normal range: tid to q 3 days)  \"When was the last BM passed?\"        Post op constipiation  2. STRAINING: \"Do you have to strain to have a BM?\"       yes  3. RECTAL PAIN: \"Does your rectum hurt when the stool comes out?\" If Yes, ask: \"Do you have hemorrhoids? How bad is the pain?\"  (Scale 1-10; or mild, moderate, severe)      Hard stools  4. STOOL COMPOSITION: \"Are the stools hard?\"       Passed very small " "amount of stool  5. BLOOD ON STOOLS: \"Has there been any blood on the toilet tissue or on the surface of the BM?\" If Yes, ask: \"When was the last time?\"       none  6. CHRONIC CONSTIPATION: \"Is this a new problem for you?\"  If no, ask: \"How long have you had this problem?\" (days, weeks, months)       No just since surgery  7. CHANGES IN DIET OR HYDRATION: \"Have there been any recent changes in your diet?\" \"How much fluids are you drinking consuming on a daily basis?\"  \"How much have you had to drink today?\"      Yes since surgery  8. MEDICATIONS: \"Have you been taking any new medications?\" \"Are you taking any narcotic pain medications?\" (e.g., Vicoden, Percocet, morphine, dilaudid)      lortab  9. LAXATIVES: \"Have you been using any stool softeners, laxatives, or enemas?\"  If yes, ask \"What, how often, and when was the last time?\"  10.ACTIVITY:  \"How much walking do you do every day? on a daily basis?\"  \"Has your activity level decreased in the past week?\"         Milk of magnesia  11. CAUSE: \"What do you think is causing the constipation?\"         Post op and narcotic pain med  12. OTHER SYMPTOMS: \"Do you have any other symptoms?\" (e.g., abdominal pain, bloating, fever, vomiting)        Feeling bloated  13. MEDICAL HISTORY: \"Do you have a history of hemorrhoids, rectal fissures, or rectal surgery or rectal abscess?\"          No  14. PREGNANCY: \"Is there any chance you are pregnant?\" \"When was your last menstrual period?\"        no    Protocols used: CONSTIPATION-ADULT-AH      " Patient is a 76y old  Female who presents with a chief complaint of Anemia (29 Apr 2022 11:03)      INTERVAL HPI/OVERNIGHT EVENTS: Patient seen and examined at bedside. No overnight events    MEDICATIONS  (STANDING):  chlorhexidine 0.12% Liquid 15 milliLiter(s) Oral Mucosa every 12 hours  chlorhexidine 2% Cloths 1 Application(s) Topical daily  collagenase Ointment 1 Application(s) Topical daily  dextrose 5%. 1000 milliLiter(s) (100 mL/Hr) IV Continuous <Continuous>  dextrose 5%. 1000 milliLiter(s) (50 mL/Hr) IV Continuous <Continuous>  dextrose 50% Injectable 25 Gram(s) IV Push once  dextrose 50% Injectable 12.5 Gram(s) IV Push once  dextrose 50% Injectable 25 Gram(s) IV Push once  folic acid 1 milliGRAM(s) Oral daily  glucagon  Injectable 1 milliGRAM(s) IntraMuscular once  heparin   Injectable 5000 Unit(s) SubCutaneous every 12 hours  insulin glargine Injectable (LANTUS) 10 Unit(s) SubCutaneous every morning  insulin lispro (ADMELOG) corrective regimen sliding scale   SubCutaneous every 6 hours  lactobacillus acidophilus 1 Tablet(s) Oral two times a day  LORazepam     Tablet 1 milliGRAM(s) Oral every 4 hours  methocarbamol 500 milliGRAM(s) Oral two times a day  mineral oil/petrolatum Hydrophilic Ointment 1 Application(s) Topical daily  multivitamin 1 Tablet(s) Oral daily  pantoprazole  Injectable 40 milliGRAM(s) IV Push daily  polyethylene glycol 3350 17 Gram(s) Oral two times a day  povidone iodine 10% Ointment 1 Application(s) Topical every 12 hours  senna 2 Tablet(s) Oral at bedtime  simethicone 80 milliGRAM(s) Chew every 6 hours  sucralfate 1 Gram(s) Oral four times a day    MEDICATIONS  (PRN):  acetaminophen     Tablet .. 650 milliGRAM(s) Oral every 6 hours PRN Temp greater or equal to 38C (100.4F)  ALBUTerol    90 MICROgram(s) HFA Inhaler 2 Puff(s) Inhalation every 6 hours PRN Shortness of Breath and/or Wheezing  dextrose Oral Gel 15 Gram(s) Oral once PRN Blood Glucose LESS THAN 70 milliGRAM(s)/deciliter  LORazepam   Injectable 1 milliGRAM(s) IV Push every 6 hours PRN Anxiety  sodium chloride 0.9% lock flush 10 milliLiter(s) IV Push every 1 hour PRN Pre/post blood products, medications, blood draw, and to maintain line patency      Allergies    codeine (Hives)    Intolerances        REVIEW OF SYSTEMS:  Unable to obtain meaningful ROS due to mental status      Vital Signs Last 24 Hrs  T(C): 36.9 (29 Apr 2022 10:38), Max: 36.9 (29 Apr 2022 04:43)  T(F): 98.4 (29 Apr 2022 10:38), Max: 98.4 (29 Apr 2022 04:43)  HR: 86 (29 Apr 2022 11:10) (79 - 116)  BP: 150/80 (29 Apr 2022 10:00) (121/70 - 157/78)  BP(mean): 100 (29 Apr 2022 10:00) (86 - 100)  RR: 23 (29 Apr 2022 10:00) (18 - 30)  SpO2: 98% (29 Apr 2022 11:10) (93% - 100%)    PHYSICAL EXAM:  GENERAL: chronically ill appearing, emaciated, NAD, obtunded  HEAD:  atraumatic  EYES: conjunctiva clear  NECK: (+)trach in place, clean  RESPIRATORY: mechanical breath sounds, vent in place, no gross crackles or rales  CARDIOVASCULAR:  regular rate and rhythm, no murmurs or rubs or gallops  GASTROINTESTINAL:  soft, +PEG in place, clean and dry as well, nondistended, bowel sounds present  EXTREMITIES:  no clubbing or cyanosis or edema  MUSCULOSKELETAL:  (+)diffuse contractures in all joints  NERVOUS SYSTEM: does not respond to pain    LABS:                        9.0    6.94  )-----------( 365      ( 29 Apr 2022 06:56 )             29.8     CBC Full  -  ( 29 Apr 2022 06:56 )  WBC Count : 6.94 K/uL  Hemoglobin : 9.0 g/dL  Hematocrit : 29.8 %  Platelet Count - Automated : 365 K/uL  Mean Cell Volume : 81.4 fl  Mean Cell Hemoglobin : 24.6 pg  Mean Cell Hemoglobin Concentration : 30.2 gm/dL  Auto Neutrophil # : 4.24 K/uL  Auto Lymphocyte # : 1.48 K/uL  Auto Monocyte # : 0.79 K/uL  Auto Eosinophil # : 0.16 K/uL  Auto Basophil # : 0.04 K/uL  Auto Neutrophil % : 61.1 %  Auto Lymphocyte % : 21.3 %  Auto Monocyte % : 11.4 %  Auto Eosinophil % : 2.3 %  Auto Basophil % : 0.6 %    29 Apr 2022 06:56    138    |  105    |  28     ----------------------------<  147    4.6     |  25     |  1.04     Ca    9.0        29 Apr 2022 06:56    TPro  7.0    /  Alb  1.9    /  TBili  0.4    /  DBili  x      /  AST  14     /  ALT  14     /  AlkPhos  169    29 Apr 2022 06:56        CAPILLARY BLOOD GLUCOSE      POCT Blood Glucose.: 162 mg/dL (29 Apr 2022 07:38)  POCT Blood Glucose.: 141 mg/dL (29 Apr 2022 06:22)  POCT Blood Glucose.: 175 mg/dL (28 Apr 2022 23:43)  POCT Blood Glucose.: 188 mg/dL (28 Apr 2022 17:28)  POCT Blood Glucose.: 179 mg/dL (28 Apr 2022 11:34)        Culture - Sputum (collected 04-24-22 @ 12:35)  Source: .Sputum Sputum  Gram Stain (04-24-22 @ 15:10):    Few Squamous epithelial cells per low power field    Moderate polymorphonuclear leukocytes per low power field    Moderate Gram Negative Rods per oil power field  Final Report (04-27-22 @ 07:34):    Moderate Pseudomonas aeruginosa (Carbapenem Resistant)    Moderate Serratia marcescens    Normal Respiratory Elvira present  Organism: Pseudomonas aeruginosa (Carbapenem Resistant)  Serratia marcescens (04-27-22 @ 07:34)  Organism: Serratia marcescens (04-27-22 @ 07:34)      -  Amikacin: S <=16      -  Amoxicillin/Clavulanic Acid: R >16/8      -  Ampicillin: R >16 These ampicillin results predict results for amoxicillin      -  Ampicillin/Sulbactam: R >16/8 Enterobacter, Klebsiella aerogenes, Citrobacter, and Serratia may develop resistance during prolonged therapy (3-4 days)      -  Aztreonam: R >16      -  Cefazolin: R >16 Enterobacter, Klebsiella aerogenes, Citrobacter, and Serratia may develop resistance during prolonged therapy (3-4 days)      -  Cefepime: S 8      -  Cefoxitin: R >16      -  Ceftriaxone: R >32 Enterobacter, Klebsiella aerogenes, Citrobacter, and Serratia may develop resistance during prolonged therapy      -  Ciprofloxacin: R 2      -  Ertapenem: S <=0.5      -  Gentamicin: S <=2      -  Levofloxacin: R 2      -  Meropenem: S <=1      -  Piperacillin/Tazobactam: S 16      -  Tobramycin: S 4      -  Trimethoprim/Sulfamethoxazole: S <=0.5/9.5      Method Type: PRATIK  Organism: Pseudomonas aeruginosa (Carbapenem Resistant) (04-27-22 @ 07:34)      -  Amikacin: S <=16      -  Aztreonam: S <=4      -  Cefepime: S 8      -  Ceftazidime: S 8      -  Ciprofloxacin: S <=0.25      -  Gentamicin: S <=2      -  Imipenem: R >8      -  Levofloxacin: S <=0.5      -  Meropenem: R 8      -  Piperacillin/Tazobactam: S 16      -  Tobramycin: S <=2      Method Type: PRATIK    Culture - Other (collected 04-22-22 @ 20:58)  Source: Skin Skin  Final Report (04-25-22 @ 21:04):    Culture yields growth of greater than 3 colony types of    Call client services within 7 days if further workup is clinically    indicated.        RADIOLOGY & ADDITIONAL TESTS:     Consultant(s) Notes Reviewed:  [x] YES  [ ] NO    Care Discussed with [x] Consultants  [x] Patient  [ ] Family  [ ]      [ x] Other; RN  DVT ppx

## 2022-05-16 ENCOUNTER — TELEPHONE (OUTPATIENT)
Dept: INTERNAL MEDICINE | Facility: CLINIC | Age: 72
End: 2022-05-16

## 2022-05-18 NOTE — PROGRESS NOTES
Subjective    Mr. Edwards is 71 y.o. male    Chief Complaint:6 Month follow up for Elevated PSA.  History of Present Illness  Patient here for 6-month follow-up he did get a PSA prior patient had history of BPH and elevated PSA he had a transurethral section of the prostate August 30, 2021 pathology was consistent with chronic inflammation and 21 g of tissue were removed with no evidence of malignancy.  Voiding symptoms are well controlled he has no bothersome complaints today he has improved after surgery his most recent PSA is 0.6 previous was over 20.  His AUA score 5 out of 35.    The following portions of the patient's history were reviewed and updated as appropriate: allergies, current medications, past family history, past medical history, past social history, past surgical history and problem list.    Review of Systems   Constitutional: Negative for chills and fever.   Gastrointestinal: Negative for abdominal pain, anal bleeding and blood in stool.   Genitourinary: Negative for decreased urine volume, difficulty urinating, dysuria, enuresis, flank pain, frequency, genital sores, hematuria, penile discharge, penile pain, penile swelling, scrotal swelling, testicular pain and urgency.         Current Outpatient Medications:   •  amLODIPine (NORVASC) 5 MG tablet, Take 1 tablet by mouth Daily., Disp: 30 tablet, Rfl: 11  •  aspirin (aspirin) 81 MG EC tablet, Take 1 tablet by mouth Daily., Disp: 30 tablet, Rfl: 11  •  cloNIDine (Catapres) 0.1 MG tablet, Take 1 tablet by mouth 2 (Two) Times a Day As Needed for High Blood Pressure (> 150/90). (Patient taking differently: Take 0.1 mg by mouth Daily.), Disp: 60 tablet, Rfl: 11  •  Contour Next Test test strip, USE ONE EVERY DAY, Disp: 100 each, Rfl: 3  •  fluticasone (FLONASE) 50 MCG/ACT nasal spray, USE 2 SPRAYS IN EACH NOSTRIL TWO TIMES A DAY, Disp: 16 g, Rfl: 3  •  ipratropium (ATROVENT) 0.06 % nasal spray, 2 sprays into the nostril(s) as directed by provider 4  (Four) Times a Day As Needed for Rhinitis. For drainage, Disp: 45 mL, Rfl: 3  •  irbesartan (AVAPRO) 300 MG tablet, TAKE 1 TABLET BY MOUTH DAILY, Disp: 90 tablet, Rfl: 0  •  Lancets Ultra Thin misc, 1 (ONE) MISC DAILY, Disp: 100 each, Rfl: 3  •  nitroglycerin (NITROSTAT) 0.4 MG SL tablet, 1 under the tongue as needed for angina, may repeat q5mins for up three doses, Disp: 25 tablet, Rfl: 11  •  rosuvastatin (CRESTOR) 5 MG tablet, Take 1 tablet by mouth Daily., Disp: 30 tablet, Rfl: 11    Past Medical History:   Diagnosis Date   • Abnormal PSA    • Chest pain    • Diabetes mellitus (HCC)     diet controlled   • Elevated cholesterol    • Hypertension    • PONV (postoperative nausea and vomiting)     SEVERE   • Seasonal allergies        Past Surgical History:   Procedure Laterality Date   • APPENDECTOMY     • CARDIAC CATHETERIZATION     • CARDIAC CATHETERIZATION Left 8/5/2021    Procedure: Cardiac Catheterization/Vascular Study;  Surgeon: Luke Dixon MD;  Location: [facility] CATH INVASIVE LOCATION;  Service: Cardiology;  Laterality: Left;   • COLONOSCOPY  11/16/2012    Normal. Repeat in 5 years. Dr. Tez Perdomo   • COLONOSCOPY N/A 11/21/2017    Procedure: COLONOSCOPY WITH ANESTHESIA;  Surgeon: Tez Perdomo MD;  Location: [facility] ENDOSCOPY;  Service:    • CYSTOSCOPY TRANSURETHRAL RESECTION OF PROSTATE N/A 8/30/2021    Procedure: CYSTOSCOPY TRANSURETHRAL RESECTION OF PROSTATE;  Surgeon: Ric Tobin MD;  Location: [facility] OR;  Service: Urology;  Laterality: N/A;   • MANDIBLE SURGERY     • PROSTATE BIOPSY     • PROSTATE BIOPSY N/A 3/19/2020    Procedure: PROSTATE ULTRASOUND BIOPSY MRI FUSION WITH URONAV;  Surgeon: Ric Tobin MD;  Location: [facility] OR;  Service: Urology;  Laterality: N/A;       Social History     Socioeconomic History   • Marital status:    Tobacco Use   • Smoking status: Never Smoker   • Smokeless tobacco: Never Used   Vaping Use   • Vaping Use: Never used   Substance and  "Sexual Activity   • Alcohol use: No   • Drug use: No   • Sexual activity: Defer       Family History   Problem Relation Age of Onset   • Heart disease Father    • No Known Problems Mother    • No Known Problems Brother    • No Known Problems Brother    • No Known Problems Sister    • No Known Problems Sister    • No Known Problems Sister    • No Known Problems Sister    • Stomach cancer Sister    • Cancer Sister 69   • Colon cancer Neg Hx    • Colon polyps Neg Hx        Objective    Temp 98.3 °F (36.8 °C)   Ht 180.3 cm (70.98\")   Wt 84.8 kg (187 lb)   BMI 26.09 kg/m²     Physical Exam  Vitals reviewed.   Constitutional:       Appearance: Normal appearance.   HENT:      Head: Normocephalic and atraumatic.      Nose: No congestion.   Pulmonary:      Effort: Pulmonary effort is normal.   Skin:     Coloration: Skin is not pale.   Neurological:      Mental Status: He is alert and oriented to person, place, and time.   Psychiatric:         Mood and Affect: Mood normal.         Behavior: Behavior normal.             Results for orders placed or performed in visit on 05/23/22   POC Urinalysis Dipstick, Multipro    Specimen: Urine   Result Value Ref Range    Color Clarissa Yellow, Straw, Dark Yellow, Clarissa    Clarity, UA Clear Clear    Glucose, UA Negative Negative, 1000 mg/dL (3+) mg/dL    Bilirubin Negative Negative    Ketones, UA Negative Negative    Specific Gravity  1.020 1.005 - 1.030    Blood, UA Trace (A) Negative    pH, Urine 5.5 5.0 - 8.0    Protein, POC 2+ (A) Negative mg/dL    Urobilinogen, UA Normal Normal    Nitrite, UA Negative Negative    Leukocytes Negative Negative   IPSS Questionnaire (AUA-7):  Incomplete emptying  Over the past month, how often have you had a sensation of not emptying your bladder completely after you finish?: Less than 1 time in 5 (05/23/22 0835)  Frequency  Over the past month, how often have you had to urinate again less than two hours after you finishing urinating ?: Less than 1 time " in 5 (05/23/22 0835)  Intermittency  Over the past month, how often have you found you stopped and started again several time when you urinated ?: Not at all (05/23/22 0835)  Urgency  Over the last month, how difficult  have you found it to postpone urination ?: Less than 1 time in 5 (05/23/22 0835)  Weak Stream  Over the past month, how often have you had a weak urinary stream ?: Less than 1 time in 5 (05/23/22 0835)  Straining  Over the past month, how often have you had to push or strain to begin urination ?: Not at all (05/23/22 0835)  Nocturia  Over the past month, how many times did you most typically get up to urinate from the time you went to bed until the time you got up in the morning ?: Less than 1 time in 5 (05/23/22 0835)  Quality of life due to urinary symptoms  If you were to spend the rest of your life with your urinary condition the way it is now, how would feel about that?: Delighted (05/23/22 0835)    Scores  Total IPSS Score: (!) 5 (05/23/22 0835)  Total Score = Symtomatic Level: Mildly symptomatic: 0-7 (05/23/22 0835)        Assessment and Plan    Diagnoses and all orders for this visit:    1. Elevated prostate specific antigen (PSA) (Primary)  -     POC Urinalysis Dipstick, Multipro  -     PSA DIAGNOSTIC; Future    2. BPH with obstruction/lower urinary tract symptoms    Patient with previous retention and elevated PSA comes in for 6-month follow-up his PSA is now 0.6.  Previous was 20.  He has good symptom control AUA 5 out of 35.  The pathology of his prostate sample after TURP was benign prostatic tissue and chronic inflammation.  Recommend follow-up 1 year PSA prior.

## 2022-05-23 ENCOUNTER — OFFICE VISIT (OUTPATIENT)
Dept: UROLOGY | Facility: CLINIC | Age: 72
End: 2022-05-23

## 2022-05-23 VITALS — TEMPERATURE: 98.3 F | BODY MASS INDEX: 26.18 KG/M2 | WEIGHT: 187 LBS | HEIGHT: 71 IN

## 2022-05-23 DIAGNOSIS — N13.8 BPH WITH OBSTRUCTION/LOWER URINARY TRACT SYMPTOMS: ICD-10-CM

## 2022-05-23 DIAGNOSIS — N40.1 BPH WITH OBSTRUCTION/LOWER URINARY TRACT SYMPTOMS: ICD-10-CM

## 2022-05-23 DIAGNOSIS — R97.20 ELEVATED PROSTATE SPECIFIC ANTIGEN (PSA): Primary | ICD-10-CM

## 2022-05-23 LAB
BILIRUB BLD-MCNC: NEGATIVE MG/DL
CLARITY, POC: CLEAR
COLOR UR: ABNORMAL
GLUCOSE UR STRIP-MCNC: NEGATIVE MG/DL
KETONES UR QL: NEGATIVE
LEUKOCYTE EST, POC: NEGATIVE
NITRITE UR-MCNC: NEGATIVE MG/ML
PH UR: 5.5 [PH] (ref 5–8)
PROT UR STRIP-MCNC: ABNORMAL MG/DL
RBC # UR STRIP: ABNORMAL /UL
SP GR UR: 1.02 (ref 1–1.03)
UROBILINOGEN UR QL: NORMAL

## 2022-05-23 PROCEDURE — 99213 OFFICE O/P EST LOW 20 MIN: CPT | Performed by: PHYSICIAN ASSISTANT

## 2022-05-23 PROCEDURE — 81001 URINALYSIS AUTO W/SCOPE: CPT | Performed by: PHYSICIAN ASSISTANT

## 2022-06-22 ENCOUNTER — OFFICE VISIT (OUTPATIENT)
Dept: CARDIOLOGY | Facility: CLINIC | Age: 72
End: 2022-06-22

## 2022-06-22 VITALS
WEIGHT: 185 LBS | HEART RATE: 63 BPM | OXYGEN SATURATION: 98 % | HEIGHT: 71 IN | BODY MASS INDEX: 25.9 KG/M2 | DIASTOLIC BLOOD PRESSURE: 60 MMHG | SYSTOLIC BLOOD PRESSURE: 102 MMHG

## 2022-06-22 DIAGNOSIS — R00.2 PALPITATIONS: ICD-10-CM

## 2022-06-22 DIAGNOSIS — E78.5 HYPERLIPIDEMIA LDL GOAL <70: ICD-10-CM

## 2022-06-22 DIAGNOSIS — R94.31 ABNORMAL ECG: Primary | ICD-10-CM

## 2022-06-22 DIAGNOSIS — E11.9 DIET-CONTROLLED DIABETES MELLITUS: ICD-10-CM

## 2022-06-22 DIAGNOSIS — I10 BENIGN HYPERTENSION: ICD-10-CM

## 2022-06-22 DIAGNOSIS — I25.10 NON-OCCLUSIVE CORONARY ARTERY DISEASE: ICD-10-CM

## 2022-06-22 PROCEDURE — 99214 OFFICE O/P EST MOD 30 MIN: CPT | Performed by: INTERNAL MEDICINE

## 2022-06-22 NOTE — PROGRESS NOTES
Geoff Edwards  [id number]  1950  72 y.o.  male    Referring Provider: Carmelina Harrington DO    Reason for  Visit: Here for routine follow up   coronary artery disease non obstructive   Now has palpitations   Cardiac workup test results as below: echo     Subjective    Intermittent palpitations, once every several days to several weeks lasting for less than 1 minute  No associated symptoms of dizziness, weakness, chest pain,  shortness of breath    No chest pain or shortness of breath  No significant pedal edema    Compliant with medications and dietary advice  Good effort tolerance    No presyncope or syncope  Compliant with medications    Tolerating current medications well with no untoward side effects   Compliant with prescribed medication regimen. Tries to adhere to cardiac diet.      Not checking blood pressures regularly at home     BP well controlled today     No bleeding, excessive bruising, gait instability or fall risks         Excellent effort tolerance with no cardiovascular limitations and at the patient's baseline   Lipids as below   Triglycerides   0 - 150 mg/dL 78  71    HDL Cholesterol   40 - 60 mg/dL 52  63 High     VLDL Cholesterol Juan Luis   5 - 40 mg/dL 15  14    LDL Chol Calc (NIH)   0 - 100 mg/dL 61  84    [facility]ing [facility]             Narrative              History of present illness:  Geoff Edwards is a 72 y.o. yo male with coronary artery disease  who presents today for   Chief Complaint   Patient presents with   • Follow-up     Recent echo    .    History  Past Medical History:   Diagnosis Date   • Abnormal PSA    • Chest pain    • Diabetes mellitus (HCC)     diet controlled   • Elevated cholesterol    • Hypertension    • PONV (postoperative nausea and vomiting)     SEVERE   • Seasonal allergies    ,   Past Surgical History:   Procedure Laterality Date   • APPENDECTOMY     • CARDIAC CATHETERIZATION     • CARDIAC CATHETERIZATION Left 8/5/2021    Procedure:  Cardiac Catheterization/Vascular Study;  Surgeon: Luke Dixon MD;  Location: [facility] CATH INVASIVE LOCATION;  Service: Cardiology;  Laterality: Left;   • COLONOSCOPY  11/16/2012    Normal. Repeat in 5 years. Dr. Tez Perdomo   • COLONOSCOPY N/A 11/21/2017    Procedure: COLONOSCOPY WITH ANESTHESIA;  Surgeon: Tez Perdomo MD;  Location: [facility] ENDOSCOPY;  Service:    • CYSTOSCOPY TRANSURETHRAL RESECTION OF PROSTATE N/A 8/30/2021    Procedure: CYSTOSCOPY TRANSURETHRAL RESECTION OF PROSTATE;  Surgeon: Ric Tobin MD;  Location: [facility] OR;  Service: Urology;  Laterality: N/A;   • MANDIBLE SURGERY     • PROSTATE BIOPSY     • PROSTATE BIOPSY N/A 3/19/2020    Procedure: PROSTATE ULTRASOUND BIOPSY MRI FUSION WITH URONAV;  Surgeon: Ric Tobin MD;  Location: [facility] OR;  Service: Urology;  Laterality: N/A;   ,   Family History   Problem Relation Age of Onset   • Heart disease Father    • No Known Problems Mother    • No Known Problems Brother    • No Known Problems Brother    • No Known Problems Sister    • No Known Problems Sister    • No Known Problems Sister    • No Known Problems Sister    • Stomach cancer Sister    • Cancer Sister 69   • Colon cancer Neg Hx    • Colon polyps Neg Hx    ,   Social History     Tobacco Use   • Smoking status: Never Smoker   • Smokeless tobacco: Never Used   Vaping Use   • Vaping Use: Never used   Substance Use Topics   • Alcohol use: No   • Drug use: No   ,     Medications  Current Outpatient Medications   Medication Sig Dispense Refill   • amLODIPine (NORVASC) 5 MG tablet Take 1 tablet by mouth Daily. 30 tablet 11   • aspirin (aspirin) 81 MG EC tablet Take 1 tablet by mouth Daily. 30 tablet 11   • cloNIDine (Catapres) 0.1 MG tablet Take 1 tablet by mouth 2 (Two) Times a Day As Needed for High Blood Pressure (> 150/90). (Patient taking differently: Take 0.1 mg by mouth Daily.) 60 tablet 11   • Contour Next Test test strip USE ONE EVERY  each 3   • fluticasone  "(FLONASE) 50 MCG/ACT nasal spray USE 2 SPRAYS IN EACH NOSTRIL TWO TIMES A DAY 16 g 3   • ipratropium (ATROVENT) 0.06 % nasal spray 2 sprays into the nostril(s) as directed by provider 4 (Four) Times a Day As Needed for Rhinitis. For drainage 45 mL 3   • irbesartan (AVAPRO) 300 MG tablet TAKE 1 TABLET BY MOUTH DAILY 90 tablet 0   • Lancets Ultra Thin misc 1 (ONE) MISC DAILY 100 each 3   • nitroglycerin (NITROSTAT) 0.4 MG SL tablet 1 under the tongue as needed for angina, may repeat q5mins for up three doses 25 tablet 11   • rosuvastatin (CRESTOR) 5 MG tablet Take 1 tablet by mouth Daily. 30 tablet 11     No current facility-administered medications for this visit.       Allergies:  Ace inhibitors    Review of Systems  Review of Systems   Constitutional: Negative.   HENT: Negative.    Eyes: Negative.    Cardiovascular: Positive for palpitations. Negative for chest pain, claudication, cyanosis, dyspnea on exertion, irregular heartbeat, leg swelling, near-syncope, orthopnea, paroxysmal nocturnal dyspnea and syncope.   Respiratory: Negative.    Endocrine: Negative.    Hematologic/Lymphatic: Negative.    Skin: Negative.    Gastrointestinal: Negative for anorexia.   Genitourinary: Negative.    Neurological: Negative.    Psychiatric/Behavioral: Negative.        Objective     Physical Exam:  /60   Pulse 63   Ht 180.3 cm (71\")   Wt 83.9 kg (185 lb)   SpO2 98%   BMI 25.80 kg/m²     Physical Exam  Constitutional:       Appearance: He is well-developed.   HENT:      Head: Normocephalic.   Neck:      Vascular: Normal carotid pulses. No carotid bruit or JVD.      Trachea: No tracheal tenderness or tracheal deviation.   Cardiovascular:      Rate and Rhythm: Regular rhythm.      Pulses: Normal pulses.      Heart sounds: Murmur heard.    Systolic murmur is present with a grade of 2/6.  Pulmonary:      Effort: Pulmonary effort is normal.      Breath sounds: No stridor.   Abdominal:      General: There is no distension.      " Palpations: Abdomen is soft.      Tenderness: There is no abdominal tenderness.   Musculoskeletal:      Cervical back: No edema.   Skin:     General: Skin is warm.   Neurological:      Mental Status: He is alert.      Cranial Nerves: No cranial nerve deficit.      Sensory: No sensory deficit.   Psychiatric:         Speech: Speech normal.         Behavior: Behavior normal.         Results Review:      Results for orders placed during the hospital encounter of 03/02/22    Adult Transthoracic Echo Complete w/ Color, Spectral and Contrast if necessary per protocol    Interpretation Summary  · Left ventricular wall thickness is consistent with mild concentric hypertrophy.  · Left ventricular ejection fraction appears to be 61 - 65%. Left ventricular systolic function is normal.  · Abnormal global longitudinal LV strain (GLS) = -15.0%  · Left ventricular diastolic function was normal.  · Estimated right ventricular systolic pressure from tricuspid regurgitation is normal (<35 mmHg).      ____________________________________________________________________________________________________________________________________________  Health maintenance and recommendations    Low salt/ HTN/ Heart healthy carbohydrate restricted cardiac diet (printed dietary and general instructions provided for home review )  The patient is advised to reduce or avoid caffeine or other cardiac stimulants.     The patient was advised to avoid long term NSAIDS , use Tylenol PRN instead  Avoid cardiac stimulants including common drugs like Pseudoephedrine or excessive amounts of caffeine  Monitor for any signs of bleeding including red or dark stools. Fall precautions.   Advised staying uptodate with immunizations per established standard guidelines.  Offered to give patient  a copy      Questions were encouraged, asked and answered to the patient's  understanding and satisfaction. Questions if any regarding current medications and side effects, need  for refills and importance of compliance to medications stressed.    Reviewed available prior notes, consults, prior visits, laboratory findings, radiology and cardiology relevant reports. Updated chart as applicable. I have reviewed the patient's medical history in detail and updated the computerized patient record as relevant.      Updated patient regarding any new or relevant abnormalities on review of records or any new findings on physical exam. Mentioned to patient about purpose of visit and desirable health short and long term goals and objectives.    Primary to monitor CBC CMP Lipid panel and TSH as applicable    ___________________________________________________________________________________________________________________________________________           Procedures    Assessment & Plan   Diagnoses and all orders for this visit:    1. Abnormal ECG Anterior infarct  (Primary)    2. Benign hypertension    3. Diet-controlled diabetes mellitus (HCC)    4. Hyperlipidemia LDL goal <70    5. Non-occlusive coronary artery disease    6. Palpitations  -     Holter Monitor - 72 Hour Up To 15 Days; Future          Plan    Orders Placed This Encounter   Procedures   • Holter Monitor - 72 Hour Up To 15 Days     Standing Status:   Future     Standing Expiration Date:   6/22/2023     Order Specific Question:   Reason for exam?     Answer:   Palpitations     Order Specific Question:   Release to patient     Answer:   Immediate     Order Specific Question:   How many days is the patient to wear the monitor?     Answer:   14     May need low dose beta blocker therapy in future      Patient expressed understanding  Encouraged and answered all questions   Discussed with the patient and all questioned fully answered. He will call me if any problems arise.   Discussed results of prior testing with patient : echo      Check BP and heart rates twice daily initially till blood pressures and heart rates under good control and  then at least 3x / week,   If blood pressures continue to be well-controlled then can check week a month  at home and bring a recording for review next visit  If BP >130/85 or < 100/60 persistently over 3 reading 30 mins apart or if heart rates persistently above 100 bpm or less than 55 bpm call sooner for evaluation and advise     Monitor for any signs of bleeding including red or dark stools as well as easy bruisabilty. Fall precautions.       Keep A1c less than 7 Primary to monitor  Keep LDL below 70 mg/dl. Monitor liver and renal functions.   Monitor CBC, CMP, TSH (as indicated) and Lipid Panel by primary      I support the patient's decision to take the Covid -19 vaccine   Had required complete course   No major issues   Now fully immunized    Had booster too      Follow up with Olivia Reyes PA-C or myself             Return in about 3 months (around 9/22/2022).

## 2022-08-03 RX ORDER — IRBESARTAN 300 MG/1
TABLET ORAL
Qty: 90 TABLET | Refills: 1 | Status: SHIPPED | OUTPATIENT
Start: 2022-08-03 | End: 2023-01-30

## 2022-08-05 RX ORDER — AMLODIPINE BESYLATE 5 MG/1
5 TABLET ORAL DAILY
Qty: 30 TABLET | Refills: 11 | Status: SHIPPED | OUTPATIENT
Start: 2022-08-05

## 2022-08-22 RX ORDER — ROSUVASTATIN CALCIUM 5 MG/1
5 TABLET, COATED ORAL DAILY
Qty: 90 TABLET | Refills: 3 | Status: SHIPPED | OUTPATIENT
Start: 2022-08-22

## 2022-09-21 ENCOUNTER — OFFICE VISIT (OUTPATIENT)
Dept: CARDIOLOGY | Facility: CLINIC | Age: 72
End: 2022-09-21

## 2022-09-21 VITALS
HEIGHT: 70 IN | WEIGHT: 181 LBS | BODY MASS INDEX: 25.91 KG/M2 | HEART RATE: 60 BPM | SYSTOLIC BLOOD PRESSURE: 115 MMHG | DIASTOLIC BLOOD PRESSURE: 73 MMHG

## 2022-09-21 DIAGNOSIS — E78.5 HYPERLIPIDEMIA LDL GOAL <70: ICD-10-CM

## 2022-09-21 DIAGNOSIS — I10 BENIGN HYPERTENSION: ICD-10-CM

## 2022-09-21 DIAGNOSIS — R29.818 SUSPECTED SLEEP APNEA: ICD-10-CM

## 2022-09-21 DIAGNOSIS — E11.9 DIET-CONTROLLED DIABETES MELLITUS: Primary | ICD-10-CM

## 2022-09-21 DIAGNOSIS — R00.1 BRADYCARDIA, SINUS: ICD-10-CM

## 2022-09-21 DIAGNOSIS — I47.1 PAROXYSMAL SVT (SUPRAVENTRICULAR TACHYCARDIA): ICD-10-CM

## 2022-09-21 DIAGNOSIS — I25.10 NON-OCCLUSIVE CORONARY ARTERY DISEASE: ICD-10-CM

## 2022-09-21 DIAGNOSIS — R94.31 ABNORMAL ECG: ICD-10-CM

## 2022-09-21 PROBLEM — I47.10 PAROXYSMAL SVT (SUPRAVENTRICULAR TACHYCARDIA): Status: ACTIVE | Noted: 2022-09-21

## 2022-09-21 PROCEDURE — 93000 ELECTROCARDIOGRAM COMPLETE: CPT | Performed by: INTERNAL MEDICINE

## 2022-09-21 PROCEDURE — 99214 OFFICE O/P EST MOD 30 MIN: CPT | Performed by: INTERNAL MEDICINE

## 2022-09-21 NOTE — PROGRESS NOTES
Geoff Edwards  [id number]  1950  72 y.o.  male    Referring Provider: Carmelina Harrington DO    Reason for  Visit: Here for routine follow up   coronary artery disease non obstructive   Now has palpitations   Cardiac workup test results as below:outpatient cardiac telemetry     Subjective    Overall feeling well   No chest pain or shortness of breath     No palpitations  No significant pedal edema    Compliant with medications and dietary advice  Good effort tolerance    No presyncope or syncope  Compliant with medications    Tolerating current medications well with no untoward side effects   Compliant with prescribed medication regimen. Tries to adhere to cardiac diet.     Excellent effort tolerance with no cardiovascular limitations and at the patient's baseline   Runs on treadmill 30 mins with no issues 3/week   BP well controlled at home.       Denies snoring   outpatient cardiac telemetry as below     No bleeding, excessive bruising, gait instability or fall risks   Blood sugars well controlled at home            History of present illness:  eGoff Edwards is a 72 y.o. yo male with coronary artery disease  who presents today for   Chief Complaint   Patient presents with   • Coronary Artery Disease     3 mo f/u - holter results   .    History  Past Medical History:   Diagnosis Date   • Abnormal PSA    • Chest pain    • Diabetes mellitus (HCC)     diet controlled   • Elevated cholesterol    • Hypertension    • PONV (postoperative nausea and vomiting)     SEVERE   • Seasonal allergies    ,   Past Surgical History:   Procedure Laterality Date   • APPENDECTOMY     • CARDIAC CATHETERIZATION     • CARDIAC CATHETERIZATION Left 8/5/2021    Procedure: Cardiac Catheterization/Vascular Study;  Surgeon: Luke Dixon MD;  Location: [facility] CATH INVASIVE LOCATION;  Service: Cardiology;  Laterality: Left;   • COLONOSCOPY  11/16/2012    Normal. Repeat in 5 years. Dr. Tez Perdomo   • COLONOSCOPY N/A 11/21/2017     Procedure: COLONOSCOPY WITH ANESTHESIA;  Surgeon: Tez Perdomo MD;  Location: [facility] ENDOSCOPY;  Service:    • CYSTOSCOPY TRANSURETHRAL RESECTION OF PROSTATE N/A 8/30/2021    Procedure: CYSTOSCOPY TRANSURETHRAL RESECTION OF PROSTATE;  Surgeon: Ric Tobin MD;  Location: [facility] OR;  Service: Urology;  Laterality: N/A;   • MANDIBLE SURGERY     • PROSTATE BIOPSY     • PROSTATE BIOPSY N/A 3/19/2020    Procedure: PROSTATE ULTRASOUND BIOPSY MRI FUSION WITH URONAV;  Surgeon: Ric Tobin MD;  Location: [facility] OR;  Service: Urology;  Laterality: N/A;   ,   Family History   Problem Relation Age of Onset   • Heart disease Father    • No Known Problems Mother    • No Known Problems Brother    • No Known Problems Brother    • No Known Problems Sister    • No Known Problems Sister    • No Known Problems Sister    • No Known Problems Sister    • Stomach cancer Sister    • Cancer Sister 69   • Colon cancer Neg Hx    • Colon polyps Neg Hx    ,   Social History     Tobacco Use   • Smoking status: Never Smoker   • Smokeless tobacco: Never Used   Vaping Use   • Vaping Use: Never used   Substance Use Topics   • Alcohol use: No   • Drug use: No   ,     Medications  Current Outpatient Medications   Medication Sig Dispense Refill   • amLODIPine (NORVASC) 5 MG tablet TAKE 1 TABLET BY MOUTH DAILY. 30 tablet 11   • aspirin (aspirin) 81 MG EC tablet Take 1 tablet by mouth Daily. 30 tablet 11   • cloNIDine (Catapres) 0.1 MG tablet Take 1 tablet by mouth 2 (Two) Times a Day As Needed for High Blood Pressure (> 150/90). (Patient taking differently: Take 0.1 mg by mouth Daily.) 60 tablet 11   • Contour Next Test test strip USE ONE EVERY  each 3   • fluticasone (FLONASE) 50 MCG/ACT nasal spray USE 2 SPRAYS IN EACH NOSTRIL TWO TIMES A DAY 16 g 3   • ipratropium (ATROVENT) 0.06 % nasal spray 2 sprays into the nostril(s) as directed by provider 4 (Four) Times a Day As Needed for Rhinitis. For drainage 45 mL 3   •  "irbesartan (AVAPRO) 300 MG tablet TAKE 1 TABLET BY MOUTH DAILY 90 tablet 1   • Lancets Ultra Thin misc 1 (ONE) MISC DAILY 100 each 3   • nitroglycerin (NITROSTAT) 0.4 MG SL tablet 1 under the tongue as needed for angina, may repeat q5mins for up three doses 25 tablet 11   • rosuvastatin (CRESTOR) 5 MG tablet TAKE 1 TABLET BY MOUTH DAILY. 90 tablet 3     No current facility-administered medications for this visit.       Allergies:  Ace inhibitors    Review of Systems  Review of Systems   Constitutional: Negative.   HENT: Negative.    Eyes: Negative.    Cardiovascular: Negative for chest pain, claudication, cyanosis, dyspnea on exertion, irregular heartbeat, leg swelling, near-syncope, orthopnea, palpitations, paroxysmal nocturnal dyspnea and syncope.   Respiratory: Negative.    Endocrine: Negative.    Hematologic/Lymphatic: Negative.    Skin: Negative.    Gastrointestinal: Negative for anorexia.   Genitourinary: Negative.    Neurological: Negative.    Psychiatric/Behavioral: Negative.        Objective     Physical Exam:  /73   Pulse 60   Ht 177.8 cm (70\")   Wt 82.1 kg (181 lb)   BMI 25.97 kg/m²     Physical Exam  Constitutional:       Appearance: He is well-developed.   HENT:      Head: Normocephalic.   Neck:      Vascular: Normal carotid pulses. No carotid bruit or JVD.      Trachea: No tracheal tenderness or tracheal deviation.   Cardiovascular:      Rate and Rhythm: Regular rhythm.      Pulses: Normal pulses.      Heart sounds: Murmur heard.    Systolic murmur is present with a grade of 2/6.  Pulmonary:      Effort: Pulmonary effort is normal.      Breath sounds: No stridor.   Abdominal:      General: There is no distension.      Palpations: Abdomen is soft.      Tenderness: There is no abdominal tenderness.   Musculoskeletal:      Cervical back: No edema.   Skin:     General: Skin is warm.   Neurological:      Mental Status: He is alert.      Cranial Nerves: No cranial nerve deficit.      Sensory: No " sensory deficit.   Psychiatric:         Speech: Speech normal.         Behavior: Behavior normal.         Results Review:    Geoff Edwards  HOLTER MONITOR >7 DAYS UP TO 15 DAYS HOOK-UP & INTERP (CLINIC)  Order# [id number]  Reading physician: Luke Dixon MD Ordering physician: Luke Dixon MD Study date: 22       Patient Information    Patient Name   Geoff Edwards MRN   [id number] Legal Sex   Male  (Age)   1950 (72 y.o.)     Interpretation Summary       · Average HR: 60. Min HR: 38. Max HR: 142.     · Entire report was reviewed.  Monitoring in days as noted below  · The predominant rhythm noted during the testing period was sinus with rates as above while in sinus rhythm.     · Occasional supraventricular ectopics with an APC burden of: 4.4 %    · Rare premature ventricular contractions with a PVC burden of: < 1%     · No correlated arrhythmia  · No significant pauses                  Conclusion:      Baseline rhythm is sinus.  Lowest rate of 38 bpm consisted of marked sinus bradycardia at 5:34 AM presumably during sleep  Occasional premature atrial contractions with a burden of 4.4%  Single 4 beat run of nonsustained ventricular tachycardia at a maximum rate of 1 6 7 bpm and average rate of 155 bpm  31 runs of supraventricular tachycardia longest 18.3 seconds at an average rate of 126 bpm  Fastest SVT episode was 4 beats at a maximum rate of 182 bpm and average rate of 132 bpm at 6:19 PM         Conclusion of cardiac catheterization    2021        Normal left main coronary artery  Mid left anterior descending coronary artery has 30 to 40% stenosis with moderate atherosclerotic plaque with normal FFR of 0.86  FFR performed as patient has exertional chest pain and had abnormal stress echo suggestive of left anterior descending coronary artery distribution ischemia  Normal left circumflex coronary artery  Normal right coronary artery  Moderate tortuosity of epicardial vessels due to  uncontrolled hypertension  Mildly elevated left ventricular end-diastolic pressure of 14 mmHg.     ____________________________________________________________________________________________________________________________________________     Plan after cardiac catheterization     Medical management  Keep LDL well below 70 mg/dL  Start on Crestor 5 mg p.o. daily  Start amlodipine 5 mg p.o. daily  Sublingual nitroglycerin as needed for chest pain for possible small vessel disease  Aspirin 81 mg p.o. daily  Monitor blood pressures twice a day at home and bring recordings for office visit  Clonidine 0.1 mg p.o. twice daily as needed for blood pressure more than 150/90 mmHg     Intensive risk factor modifications for both primary and secondary prevention if applicable  Hydration   Observation           Results for orders placed during the hospital encounter of 03/02/22    Adult Transthoracic Echo Complete w/ Color, Spectral and Contrast if necessary per protocol    Interpretation Summary  · Left ventricular wall thickness is consistent with mild concentric hypertrophy.  · Left ventricular ejection fraction appears to be 61 - 65%. Left ventricular systolic function is normal.  · Abnormal global longitudinal LV strain (GLS) = -15.0%  · Left ventricular diastolic function was normal.  · Estimated right ventricular systolic pressure from tricuspid regurgitation is normal (<35 mmHg).      ____________________________________________________________________________________________________________________________________________  Health maintenance and recommendations    Low salt/ HTN/ Heart healthy carbohydrate restricted cardiac diet (printed dietary and general instructions provided for home review )  The patient is advised to reduce or avoid caffeine or other cardiac stimulants.     The patient was advised to avoid long term NSAIDS , use Tylenol PRN instead  Avoid cardiac stimulants including common drugs like  Pseudoephedrine or excessive amounts of caffeine  Monitor for any signs of bleeding including red or dark stools. Fall precautions.   Advised staying uptodate with immunizations per established standard guidelines.  Offered to give patient  a copy      Questions were encouraged, asked and answered to the patient's  understanding and satisfaction. Questions if any regarding current medications and side effects, need for refills and importance of compliance to medications stressed.    Reviewed available prior notes, consults, prior visits, laboratory findings, radiology and cardiology relevant reports. Updated chart as applicable. I have reviewed the patient's medical history in detail and updated the computerized patient record as relevant.      Updated patient regarding any new or relevant abnormalities on review of records or any new findings on physical exam. Mentioned to patient about purpose of visit and desirable health short and long term goals and objectives.    Primary to monitor CBC CMP Lipid panel and TSH as applicable    ___________________________________________________________________________________________________________________________________________             ECG 12 Lead    Date/Time: 9/21/2022 8:33 AM  Performed by: Luke Dixon MD  Authorized by: Luke Dixon MD   Comparison: compared with previous ECG from 2/16/2022  Comparison to previous ECG: More prominent lateral T wave inversion  Rhythm: sinus bradycardia  Rate: bradycardic  T inversion: V4, V5, V6, V3 and aVL    Clinical impression: abnormal EKG            Assessment & Plan   Diagnoses and all orders for this visit:    1. Diet-controlled diabetes mellitus (HCC) (Primary)    2. Benign hypertension    3. Abnormal ECG Anterior infarct     4. Bradycardia, sinus  -     Overnight Sleep Oximetry Study; Future    5. Suspected sleep apnea    6. Non-occlusive coronary artery disease    7. Hyperlipidemia LDL goal <70    8. Paroxysmal SVT  (supraventricular tachycardia) (HCC)    Other orders  -     ECG 12 Lead          Plan      Patient expressed understanding  Encouraged and answered all questions   Discussed with the patient and all questioned fully answered. He will call me if any problems arise.   Discussed results of prior testing with patient : outpatient cardiac telemetry    as well electrocardiogram from today         Orders Placed This Encounter   Procedures   • Overnight Sleep Oximetry Study     Standing Status:   Future     Standing Expiration Date:   9/21/2023   • ECG 12 Lead     This order was created via procedure documentation     Order Specific Question:   Release to patient     Answer:   Routine Release     Minor asymptomatic SVT for which no additional medical therapy is advised   Does not have any malignant arrhythmia or atrial fibrillation      Patient expressed understanding  Encouraged and answered all questions   Discussed with the patient and all questioned fully answered. He will call me if any problems arise.   Discussed results of prior testing with patient : echo      Check BP and heart rates twice daily initially till blood pressures and heart rates under good control and then at least 3x / week,   If blood pressures continue to be well-controlled then can check week a month  at home and bring a recording for review next visit  If BP >130/85 or < 100/60 persistently over 3 reading 30 mins apart or if heart rates persistently above 100 bpm or less than 55 bpm call sooner for evaluation and advise     Monitor for any signs of bleeding including red or dark stools as well as easy bruisabilty. Fall precautions.       Keep A1c less than 7 Primary to monitor  Keep LDL below 70 mg/dl. Monitor liver and renal functions.   Monitor CBC, CMP, TSH (as indicated) and Lipid Panel by primary      I support the patient's decision to take the Covid -19 vaccine   Had required complete course   No major issues   Now fully immunized    Had  booster too      Follow up with Verónica WHITTEN, Erick WHITTEN  or Bonifacio WHITTEN in [address]       Return in about 3 months (around 12/21/2022).                  negative...

## 2022-09-30 ENCOUNTER — TELEPHONE (OUTPATIENT)
Dept: CARDIOLOGY | Facility: CLINIC | Age: 72
End: 2022-09-30

## 2022-10-04 ENCOUNTER — OFFICE VISIT (OUTPATIENT)
Dept: INTERNAL MEDICINE | Facility: CLINIC | Age: 72
End: 2022-10-04

## 2022-10-04 VITALS
HEART RATE: 65 BPM | HEIGHT: 70 IN | BODY MASS INDEX: 24.05 KG/M2 | TEMPERATURE: 96.9 F | OXYGEN SATURATION: 99 % | WEIGHT: 168 LBS | SYSTOLIC BLOOD PRESSURE: 128 MMHG | DIASTOLIC BLOOD PRESSURE: 70 MMHG

## 2022-10-04 DIAGNOSIS — R29.818 SUSPECTED SLEEP APNEA: ICD-10-CM

## 2022-10-04 DIAGNOSIS — E78.5 HYPERLIPIDEMIA LDL GOAL <70: ICD-10-CM

## 2022-10-04 DIAGNOSIS — E11.9 DIET-CONTROLLED DIABETES MELLITUS: Primary | ICD-10-CM

## 2022-10-04 DIAGNOSIS — I10 BENIGN HYPERTENSION: ICD-10-CM

## 2022-10-04 PROCEDURE — 99214 OFFICE O/P EST MOD 30 MIN: CPT | Performed by: FAMILY MEDICINE

## 2022-10-04 NOTE — PROGRESS NOTES
Subjective     Chief Complaint   Patient presents with   • Hypertension     6 month follow up    • Diabetes   • Hyperlipidemia       History of Present Illness     The patient is a 72-year-old male who presents to the clinic today for a follow-up visit.    Diet controlled diabetes mellitus  The patient's glycated hemoglobin result is up to 6.7 percent, which is the highest he has had in 2 years. He states that he has been eating more and exercising less over the summer. He denies any existing symptoms with regards to his diabetes mellitus. He denies any numbness or tingling in his legs and any sores on his feet.    Suspected sleep apnea  The patient states that he had a sleep study ordered by Dr. Dixon. He states that he turned his unit in approximately a week ago and is waiting for the results.    Vaccination history  The patient states that he regularly takes influenza vaccines every year but notes that he has not had them this year yet. He mentions wanting a COVID-19 booster shot.      Patient's PMR from outside medical facility reviewed and noted.    Review of Systems     Otherwise complete ROS reviewed and negative except as mentioned in the HPI.    Past Medical History:   Past Medical History:   Diagnosis Date   • Abnormal PSA    • Chest pain    • Diabetes mellitus (HCC)     diet controlled   • Elevated cholesterol    • Hypertension    • PONV (postoperative nausea and vomiting)     SEVERE   • Seasonal allergies      Past Surgical History:  Past Surgical History:   Procedure Laterality Date   • APPENDECTOMY     • CARDIAC CATHETERIZATION     • CARDIAC CATHETERIZATION Left 8/5/2021    Procedure: Cardiac Catheterization/Vascular Study;  Surgeon: Luke Dixon MD;  Location: [facility] CATH INVASIVE LOCATION;  Service: Cardiology;  Laterality: Left;   • COLONOSCOPY  11/16/2012    Normal. Repeat in 5 years. Dr. Tez Perdomo   • COLONOSCOPY N/A 11/21/2017    Procedure: COLONOSCOPY WITH ANESTHESIA;  Surgeon: Tez BOUDREAUX  MD Conor;  Location: [facility] ENDOSCOPY;  Service:    • CYSTOSCOPY TRANSURETHRAL RESECTION OF PROSTATE N/A 8/30/2021    Procedure: CYSTOSCOPY TRANSURETHRAL RESECTION OF PROSTATE;  Surgeon: Ric Tobin MD;  Location: [facility] OR;  Service: Urology;  Laterality: N/A;   • MANDIBLE SURGERY     • PROSTATE BIOPSY     • PROSTATE BIOPSY N/A 3/19/2020    Procedure: PROSTATE ULTRASOUND BIOPSY MRI FUSION WITH URONAV;  Surgeon: Ric Tobin MD;  Location: [facility] OR;  Service: Urology;  Laterality: N/A;     Social History:  reports that he has never smoked. He has never used smokeless tobacco. He reports that he does not drink alcohol and does not use drugs.    Family History: family history includes Cancer (age of onset: 69) in his sister; Heart disease in his father; No Known Problems in his brother, brother, mother, sister, sister, sister, and sister; Stomach cancer in his sister.      Allergies:  Allergies   Allergen Reactions   • Ace Inhibitors Cough     Medications:  Prior to Admission medications    Medication Sig Start Date End Date Taking? Authorizing Provider   amLODIPine (NORVASC) 5 MG tablet TAKE 1 TABLET BY MOUTH DAILY. 8/5/22  Yes Luke Dixon MD   aspirin (aspirin) 81 MG EC tablet Take 1 tablet by mouth Daily. 8/18/21  Yes Veróncia Wong APRN   cloNIDine (Catapres) 0.1 MG tablet Take 1 tablet by mouth 2 (Two) Times a Day As Needed for High Blood Pressure (> 150/90).  Patient taking differently: Take 0.1 mg by mouth Daily. 8/5/21  Yes Luke Dixon MD   Contour Next Test test strip USE ONE EVERY DAY 6/14/21  Yes Shazia Randle APRN   fluticasone (FLONASE) 50 MCG/ACT nasal spray USE 2 SPRAYS IN EACH NOSTRIL TWO TIMES A DAY 1/17/22  Yes Fernando Solano Jr., MD   ipratropium (ATROVENT) 0.06 % nasal spray 2 sprays into the nostril(s) as directed by provider 4 (Four) Times a Day As Needed for Rhinitis. For drainage 3/9/21  Yes Fernando Solano Jr., MD   irbesartan  "(AVAPRO) 300 MG tablet TAKE 1 TABLET BY MOUTH DAILY 8/3/22  Yes Carmelina Harrington DO   Lancets Ultra Thin misc 1 (ONE) MISC DAILY 1/25/21  Yes Heath Flores MD   nitroglycerin (NITROSTAT) 0.4 MG SL tablet 1 under the tongue as needed for angina, may repeat q5mins for up three doses 8/5/21  Yes Luke Dixon MD   rosuvastatin (CRESTOR) 5 MG tablet TAKE 1 TABLET BY MOUTH DAILY. 8/22/22  Yes Luke Dixon MD       Objective     Vital Signs: /70 (BP Location: Left arm, Patient Position: Sitting, Cuff Size: Adult)   Pulse 65   Temp 96.9 °F (36.1 °C) (Temporal)   Ht 177.8 cm (70\")   Wt 76.2 kg (168 lb)   SpO2 99%   BMI 24.11 kg/m²   Physical Exam  Vitals and nursing note reviewed.   Constitutional:       Appearance: Normal appearance.   HENT:      Head: Normocephalic and atraumatic.      Right Ear: External ear normal.      Left Ear: External ear normal.      Nose: Nose normal.      Mouth/Throat:      Mouth: Mucous membranes are moist.   Eyes:      Extraocular Movements: Extraocular movements intact.      Conjunctiva/sclera: Conjunctivae normal.      Pupils: Pupils are equal, round, and reactive to light.   Cardiovascular:      Rate and Rhythm: Normal rate and regular rhythm.      Pulses: Normal pulses.      Heart sounds: No murmur heard.    No friction rub. No gallop.   Pulmonary:      Effort: Pulmonary effort is normal.      Breath sounds: Normal breath sounds.   Abdominal:      General: Bowel sounds are normal.      Palpations: Abdomen is soft.   Musculoskeletal:         General: Normal range of motion.      Cervical back: Normal range of motion and neck supple.   Skin:     General: Skin is warm and dry.      Capillary Refill: Capillary refill takes less than 2 seconds.   Neurological:      General: No focal deficit present.      Mental Status: He is alert and oriented to person, place, and time.      Cranial Nerves: No cranial nerve deficit.   Psychiatric:         Mood and Affect: Mood normal.       "   Behavior: Behavior normal.         BMI is within normal parameters. No other follow-up for BMI required.      Results Reviewed:  Glucose   Date Value Ref Range Status   09/29/2022 110 (H) 65 - 99 mg/dL Final   08/27/2021 110 (H) 65 - 99 mg/dL Final     BUN   Date Value Ref Range Status   09/29/2022 15 8 - 23 mg/dL Final   08/27/2021 14 8 - 23 mg/dL Final     Creatinine   Date Value Ref Range Status   09/29/2022 1.21 0.76 - 1.27 mg/dL Final   08/27/2021 1.26 0.76 - 1.27 mg/dL Final   03/06/2020 1.20 0.60 - 1.30 mg/dL Final     Comment:     Serial Number: [id number]:  [id number]     Sodium   Date Value Ref Range Status   09/29/2022 142 136 - 145 mmol/L Final   08/27/2021 139 136 - 145 mmol/L Final     Potassium   Date Value Ref Range Status   09/29/2022 4.5 3.5 - 5.2 mmol/L Final   08/27/2021 4.3 3.5 - 5.2 mmol/L Final     Chloride   Date Value Ref Range Status   09/29/2022 106 98 - 107 mmol/L Final   08/27/2021 103 98 - 107 mmol/L Final     CO2   Date Value Ref Range Status   08/27/2021 26.0 22.0 - 29.0 mmol/L Final     Total CO2   Date Value Ref Range Status   09/29/2022 28.1 22.0 - 29.0 mmol/L Final     Calcium   Date Value Ref Range Status   09/29/2022 9.3 8.6 - 10.5 mg/dL Final   08/27/2021 9.1 8.6 - 10.5 mg/dL Final     ALT (SGPT)   Date Value Ref Range Status   09/29/2022 31 1 - 41 U/L Final   08/05/2021 17 1 - 41 U/L Final     AST (SGOT)   Date Value Ref Range Status   09/29/2022 26 1 - 40 U/L Final   08/05/2021 19 1 - 40 U/L Final     WBC   Date Value Ref Range Status   04/08/2022 6.10 3.40 - 10.80 10*3/mm3 Final     Hematocrit   Date Value Ref Range Status   04/08/2022 40.1 37.5 - 51.0 % Final   08/31/2021 34.1 (L) 37.5 - 51.0 % Final     Platelets   Date Value Ref Range Status   04/08/2022 160 140 - 450 10*3/mm3 Final   08/31/2021 233 140 - 450 10*3/mm3 Final     Triglycerides   Date Value Ref Range Status   09/29/2022 43 0 - 150 mg/dL Final     HDL Cholesterol   Date Value Ref Range Status   09/29/2022  62 (H) 40 - 60 mg/dL Final     LDL Chol Calc (NIH)   Date Value Ref Range Status   09/29/2022 49 0 - 100 mg/dL Final     Hemoglobin A1C   Date Value Ref Range Status   09/29/2022 6.70 (H) 4.80 - 5.60 % Final     Comment:     Hemoglobin A1C Ranges:  Increased Risk for Diabetes  5.7% to 6.4%  Diabetes                     >= 6.5%  Diabetic Goal                < 7.0%     04/25/2022 6.2 % Final         Assessment / Plan     Assessment/Plan:      1. Diet controlled diabetes mellitus  We discussed with the patient tightening his control over simple sugars and carbohydrates, as well as increasing his exercise, to see if we could maintain blood sugar control with diet alone. To this point, he has done very well, and I suspect he can probably continue well if he focuses on it.    2. Benign hypertension  The patient's blood pressure goal is less than 130/80 mmHg. He will continue his current medications.    3. Hyperlipidemia  The patient is to continue his statin and follow a low cholesterol diet.    4. Suspected sleep apnea  The patient had a sleep study ordered by Dr. Dixon. Through [facility], his information does not appear to be back yet. He turned his unit in approximately a week ago. I suspect testing results will take another 7 to 10 days to appear.      Return in about 3 months (around 1/4/2023). unless patient needs to be seen sooner or acute issues arise.      I have discussed the patient results/orders and and plan/recommendation with them at today's visit.      Carmelina Harrington DO   10/04/2022      Transcribed from ambient dictation for Carmelina Harrington DO by Rosa Martinez/Marlys Cardenas.  10/04/22   09:27 CDT    Patient verbalized consent to the visit recording.  I have personally performed the services described in this document as transcribed by the above individual, and it is both accurate and complete.

## 2022-10-28 ENCOUNTER — TELEPHONE (OUTPATIENT)
Dept: CARDIOLOGY | Facility: CLINIC | Age: 72
End: 2022-10-28

## 2022-11-01 DIAGNOSIS — G47.34 NOCTURNAL HYPOXIA: Primary | ICD-10-CM

## 2022-11-01 NOTE — TELEPHONE ENCOUNTER
Patient notified and voiced understanding. Patient said he was okay with having a full sleep study.  -WF

## 2022-11-04 RX ORDER — FLUTICASONE PROPIONATE 50 MCG
SPRAY, SUSPENSION (ML) NASAL
Qty: 16 G | Refills: 3 | OUTPATIENT
Start: 2022-11-04

## 2022-12-12 ENCOUNTER — OFFICE VISIT (OUTPATIENT)
Dept: NEUROLOGY | Facility: CLINIC | Age: 72
End: 2022-12-12

## 2022-12-12 VITALS
SYSTOLIC BLOOD PRESSURE: 130 MMHG | DIASTOLIC BLOOD PRESSURE: 80 MMHG | RESPIRATION RATE: 18 BRPM | WEIGHT: 181 LBS | HEART RATE: 70 BPM | BODY MASS INDEX: 25.91 KG/M2 | HEIGHT: 70 IN

## 2022-12-12 DIAGNOSIS — G47.19 DAYTIME HYPERSOMNOLENCE: Primary | ICD-10-CM

## 2022-12-12 PROCEDURE — 99213 OFFICE O/P EST LOW 20 MIN: CPT | Performed by: NURSE PRACTITIONER

## 2022-12-12 NOTE — PROGRESS NOTES
Neurology Referral Note  Referring Provider:  MARIA DOLORES Kimball    Chief Complaint:    Suspected sleep apnea    Subjective   History of Present Illness:  Geoff Edwards is a 72 y.o. male who presents today for suspected sleep apnea.  He is routinely followed by Carmelina Harrington DO for primary care.       Suspected sleep apnea  He reports that he has very little symptoms of sleep apnea at this time.  He states that he was sent by his cardiologist due to some heart concerns for evaluation of sleep apnea.  He states that he will awaken at night to use restroom sometimes multiple times per night.  He states that he will also awaken around 4 AM and not be able to fall back asleep at times.  He denies any restless sleep or snoring.  He does have witnessed apneas at night but reports is on occasion by his wife.  He indicates that he does get sleepy during the day but doesn't really have any issues with falling asleep during the day.  He states that he continually loves to work and as a result he does not feel that he is very sleepy.  He does indicate he is tired towards the end of the day.  He denies any issues with restless sleep or nonrestorative sleep.  He does not awaken gasping for air.  He is very active and does take his grandson to school in the mornings.  He has no issues with driving or falling asleep during driving.  He did have overnight sleep oximetry study which showed a time of 5 minutes less than 89%.    Allergies:    Ace inhibitors    Medications:  Current Outpatient Medications   Medication Sig Dispense Refill   • amLODIPine (NORVASC) 5 MG tablet TAKE 1 TABLET BY MOUTH DAILY. 30 tablet 11   • aspirin (aspirin) 81 MG EC tablet Take 1 tablet by mouth Daily. 30 tablet 11   • cloNIDine (Catapres) 0.1 MG tablet Take 1 tablet by mouth 2 (Two) Times a Day As Needed for High Blood Pressure (> 150/90). (Patient taking differently: Take 0.1 mg by mouth Daily.) 60 tablet 11   • Contour Next Test  test strip USE ONE EVERY  each 3   • fluticasone (FLONASE) 50 MCG/ACT nasal spray USE 2 SPRAYS IN EACH NOSTRIL TWO TIMES A DAY 16 g 3   • ipratropium (ATROVENT) 0.06 % nasal spray 2 sprays into the nostril(s) as directed by provider 4 (Four) Times a Day As Needed for Rhinitis. For drainage 45 mL 3   • irbesartan (AVAPRO) 300 MG tablet TAKE 1 TABLET BY MOUTH DAILY 90 tablet 1   • Lancets Ultra Thin misc 1 (ONE) MISC DAILY 100 each 3   • nitroglycerin (NITROSTAT) 0.4 MG SL tablet 1 under the tongue as needed for angina, may repeat q5mins for up three doses 25 tablet 11   • rosuvastatin (CRESTOR) 5 MG tablet TAKE 1 TABLET BY MOUTH DAILY. 90 tablet 3     No current facility-administered medications for this visit.     Current outpatient and discharge medications have been reconciled for the patient.  Reviewed by: MARIA DOLORES Adams    Past Medical History:  Past Medical History:   Diagnosis Date   • Abnormal PSA    • Chest pain    • Diabetes mellitus (HCC)     diet controlled   • Elevated cholesterol    • Hypertension    • PONV (postoperative nausea and vomiting)     SEVERE   • Seasonal allergies      Past Surgical History:   Procedure Laterality Date   • APPENDECTOMY     • CARDIAC CATHETERIZATION     • CARDIAC CATHETERIZATION Left 8/5/2021    Procedure: Cardiac Catheterization/Vascular Study;  Surgeon: Luke Dixon MD;  Location: [facility] CATH INVASIVE LOCATION;  Service: Cardiology;  Laterality: Left;   • COLONOSCOPY  11/16/2012    Normal. Repeat in 5 years. Dr. Tez Perdomo   • COLONOSCOPY N/A 11/21/2017    Procedure: COLONOSCOPY WITH ANESTHESIA;  Surgeon: Tez Perdomo MD;  Location: [facility] ENDOSCOPY;  Service:    • CYSTOSCOPY TRANSURETHRAL RESECTION OF PROSTATE N/A 8/30/2021    Procedure: CYSTOSCOPY TRANSURETHRAL RESECTION OF PROSTATE;  Surgeon: Ric Tobin MD;  Location: [facility] OR;  Service: Urology;  Laterality: N/A;   • MANDIBLE SURGERY     • PROSTATE BIOPSY     • PROSTATE BIOPSY N/A  "3/19/2020    Procedure: PROSTATE ULTRASOUND BIOPSY MRI FUSION WITH URONAV;  Surgeon: Ric Tobin MD;  Location: [facility] OR;  Service: Urology;  Laterality: N/A;     Family History   Problem Relation Age of Onset   • Heart disease Father    • No Known Problems Mother    • No Known Problems Brother    • No Known Problems Brother    • No Known Problems Sister    • No Known Problems Sister    • No Known Problems Sister    • No Known Problems Sister    • Stomach cancer Sister    • Cancer Sister 69   • Colon cancer Neg Hx    • Colon polyps Neg Hx      Social History     Tobacco Use   • Smoking status: Never   • Smokeless tobacco: Never   Vaping Use   • Vaping Use: Never used   Substance Use Topics   • Alcohol use: No   • Drug use: No     Review of Systems   Psychiatric/Behavioral: Positive for sleep disturbance.   All other systems reviewed and are negative.        Objective   Vital Signs:  Heart Rate:  [70] 70  Resp:  [18] 18  BP: (130)/(80) 130/80      12/12/22  1001   Weight: 82.1 kg (181 lb)     177.8 cm (70\")  Body mass index is 25.97 kg/m².    Physical Exam  Vitals reviewed.   Constitutional:       Appearance: Normal appearance.   HENT:      Head: Normocephalic.      Mouth/Throat:      Pharynx: Oropharynx is clear.   Eyes:      General: Lids are normal.      Extraocular Movements: Extraocular movements intact.      Pupils: Pupils are equal, round, and reactive to light.   Cardiovascular:      Rate and Rhythm: Normal rate and regular rhythm.      Pulses: Normal pulses.   Pulmonary:      Effort: Pulmonary effort is normal.   Musculoskeletal:         General: Normal range of motion.      Cervical back: Normal range of motion and neck supple.   Skin:     General: Skin is warm and dry.      Capillary Refill: Capillary refill takes less than 2 seconds.   Neurological:      Motor: Motor strength is normal.      Coordination: Coordination is intact.      Deep Tendon Reflexes: Reflexes are normal and symmetric. "   Psychiatric:         Mood and Affect: Mood normal.         Speech: Speech normal.       Neurological Exam  Mental Status  Awake, alert and oriented to person, place and time. Recent and remote memory are intact. Speech is normal. Language is fluent with no aphasia. Attention and concentration are normal.    Cranial Nerves  CN II: Visual acuity is normal. Visual fields full to confrontation.  CN III, IV, VI: Extraocular movements intact bilaterally. Normal lids and orbits bilaterally. Pupils equal round and reactive to light bilaterally.  CN V: Facial sensation is normal.  CN VII: Full and symmetric facial movement.  CN IX, X: Palate elevates symmetrically. Normal gag reflex.  CN XI: Shoulder shrug strength is normal.  CN XII: Tongue midline without atrophy or fasciculations.    Motor   Strength is 5/5 throughout all four extremities.    Sensory  Sensation is intact to light touch, pinprick, vibration and proprioception in all four extremities.    Reflexes  Deep tendon reflexes are 2+ and symmetric in all four extremities.    Coordination    Finger-to-nose, rapid alternating movements and heel-to-shin normal bilaterally without dysmetria.    Gait  Normal casual, toe, heel and tandem gait.    Neck Circumference: 16.5 inches  Mallampati Score: 3    [place] Sleepiness Scale: 4  STOP-BANG: High risk RICARDO     Results Review:    Lab Results   Component Value Date    GLUCOSE 110 (H) 09/29/2022    BUN 15 09/29/2022    CREATININE 1.21 09/29/2022    EGFRIFNONA 58 (L) 03/18/2021    EGFRIFAFRI 68 08/27/2021    BCR 12.4 09/29/2022    K 4.5 09/29/2022    CO2 28.1 09/29/2022    CALCIUM 9.3 09/29/2022    PROTENTOTREF 6.4 09/29/2022    ALBUMIN 4.30 09/29/2022    LABIL2 2.0 09/29/2022    AST 26 09/29/2022    ALT 31 09/29/2022     Lab Results   Component Value Date    WBC 6.10 04/08/2022    HGB 13.5 04/08/2022    HCT 40.1 04/08/2022    MCV 94.1 04/08/2022     04/08/2022     Lab Results   Component Value Date    CHLPL 122  09/29/2022    TRIG 43 09/29/2022    HDL 62 (H) 09/29/2022    LDL 49 09/29/2022     Lab Results   Component Value Date    TSH 3.880 04/08/2022     Lab Results   Component Value Date    HGBA1C 6.70 (H) 09/29/2022     No results found for: FOLATE  No results found for: [id number]    Chart Review:  Office Visit with Luke Dixon MD (09/21/2022)  Office Visit with Luke Dixon MD (06/22/2022)  Office Visit with Luke Dixon MD (02/16/2022)     Plan .  Assessment:  Geoff Edwards is a 72 y.o. male who presents with suspected sleep apnea.  He reports that he does not really feel like he has any symptoms of sleep apnea but does report witnessed apneas and sleepiness during the day.  He STOP-BANG does result to be high risk RICARDO as he has risk factors such as observed apneas, high blood pressure, age over 50 years old, neck circumference greater than 16 inches, and gender being male.  Due to this and the fact that he has overnight sleep oximetry study that shows 5 minutes less than 89% in fact that he continues to have cardiac concerns I do believe it would be best to proceed with sleep study.  We will start with home sleep study at this time.  I discussed this with the patient in detail and he states complete understanding.    Plan:  • Home sleep study  • Recommend a regular sleep schedule and sleep hygiene  • Discussed risks of untreated sleep apnea  • Recommend regular physical activity and a balanced diet  • Call me with any questions or concerns.    The patient and I have discussed the plan of care and he is in full agreement at this time.     Follow-Up:  Return in about 3 months (around 3/12/2023) for HST .       MARIA DOLORES Adams  12/12/22  17:05 CST

## 2022-12-14 DIAGNOSIS — E11.9 DIABETES MELLITUS WITHOUT COMPLICATION: ICD-10-CM

## 2022-12-15 RX ORDER — PERPHENAZINE 16 MG/1
TABLET, FILM COATED ORAL
Refills: 3 | OUTPATIENT
Start: 2022-12-15

## 2022-12-30 ENCOUNTER — OFFICE VISIT (OUTPATIENT)
Dept: INTERNAL MEDICINE | Facility: CLINIC | Age: 72
End: 2022-12-30

## 2022-12-30 VITALS
OXYGEN SATURATION: 97 % | DIASTOLIC BLOOD PRESSURE: 82 MMHG | HEART RATE: 64 BPM | HEIGHT: 70 IN | WEIGHT: 189 LBS | BODY MASS INDEX: 27.06 KG/M2 | TEMPERATURE: 98.2 F | SYSTOLIC BLOOD PRESSURE: 140 MMHG

## 2022-12-30 DIAGNOSIS — M25.461 PAIN AND SWELLING OF RIGHT KNEE: Primary | ICD-10-CM

## 2022-12-30 DIAGNOSIS — M25.561 PAIN AND SWELLING OF RIGHT KNEE: Primary | ICD-10-CM

## 2022-12-30 DIAGNOSIS — E11.9 DIABETES MELLITUS WITHOUT COMPLICATION: ICD-10-CM

## 2022-12-30 PROCEDURE — 99212 OFFICE O/P EST SF 10 MIN: CPT

## 2022-12-30 NOTE — PROGRESS NOTES
"Subjective   Geoff Edwards is a 72 y.o. male.   Chief Complaint   Patient presents with   • Knee Pain     Right knee pn with swelling   States he injured it while shoveling snow one week ago  Swelling has gotten better but pn still there   Bending and squating makes pn worse        History of Present Illness   Mr. Edwards presents here today with complaints of right knee pain and swelling  He reports that the pain began almost a week ago after shoveling snow.  He reports that the pain yesterday was severe 10 out of 10, states he started treating himself with around 200 to 400 mg of ibuprofen and today the pain is \"low \"and is not really bothering him at all, states he almost canceled his appointment.  He denies any injuries, falls, reports that 20+ years ago he did have the right knee scoped, states he has been told he has a Baker's cyst.  Reports that pain is reproduced with bending the knee such as going up stairs.  The following portions of the patient's history were reviewed and updated as appropriate: allergies, current medications, past family history, past medical history, past social history, past surgical history and problem list.    Review of Systems    Objective   Past Medical History:   Diagnosis Date   • Abnormal PSA    • Chest pain    • Diabetes mellitus (HCC)     diet controlled   • Elevated cholesterol    • Hypertension    • PONV (postoperative nausea and vomiting)     SEVERE   • Seasonal allergies       Past Surgical History:   Procedure Laterality Date   • APPENDECTOMY     • CARDIAC CATHETERIZATION     • CARDIAC CATHETERIZATION Left 8/5/2021    Procedure: Cardiac Catheterization/Vascular Study;  Surgeon: Luke Dixon MD;  Location: [facility] CATH INVASIVE LOCATION;  Service: Cardiology;  Laterality: Left;   • COLONOSCOPY  11/16/2012    Normal. Repeat in 5 years. Dr. Tez Perdomo   • COLONOSCOPY N/A 11/21/2017    Procedure: COLONOSCOPY WITH ANESTHESIA;  Surgeon: Tez Perdomo MD;  " "Location:  [facility] ENDOSCOPY;  Service:    • CYSTOSCOPY TRANSURETHRAL RESECTION OF PROSTATE N/A 8/30/2021    Procedure: CYSTOSCOPY TRANSURETHRAL RESECTION OF PROSTATE;  Surgeon: Ric Tobin MD;  Location:  [facility] OR;  Service: Urology;  Laterality: N/A;   • MANDIBLE SURGERY     • PROSTATE BIOPSY     • PROSTATE BIOPSY N/A 3/19/2020    Procedure: PROSTATE ULTRASOUND BIOPSY MRI FUSION WITH URONAV;  Surgeon: Ric Tobin MD;  Location: [facility] OR;  Service: Urology;  Laterality: N/A;        Current Outpatient Medications:   •  amLODIPine (NORVASC) 5 MG tablet, TAKE 1 TABLET BY MOUTH DAILY., Disp: 30 tablet, Rfl: 11  •  aspirin (aspirin) 81 MG EC tablet, Take 1 tablet by mouth Daily., Disp: 30 tablet, Rfl: 11  •  cloNIDine (Catapres) 0.1 MG tablet, Take 1 tablet by mouth 2 (Two) Times a Day As Needed for High Blood Pressure (> 150/90). (Patient taking differently: Take 0.1 mg by mouth Daily.), Disp: 60 tablet, Rfl: 11  •  Contour Next Test test strip, USE ONE EVERY DAY, Disp: 100 each, Rfl: 3  •  ipratropium (ATROVENT) 0.06 % nasal spray, 2 sprays into the nostril(s) as directed by provider 4 (Four) Times a Day As Needed for Rhinitis. For drainage, Disp: 45 mL, Rfl: 3  •  irbesartan (AVAPRO) 300 MG tablet, TAKE 1 TABLET BY MOUTH DAILY, Disp: 90 tablet, Rfl: 1  •  Lancets Ultra Thin misc, 1 (ONE) MISC DAILY, Disp: 100 each, Rfl: 3  •  rosuvastatin (CRESTOR) 5 MG tablet, TAKE 1 TABLET BY MOUTH DAILY., Disp: 90 tablet, Rfl: 3  •  fluticasone (FLONASE) 50 MCG/ACT nasal spray, USE 2 SPRAYS IN EACH NOSTRIL TWO TIMES A DAY, Disp: 16 g, Rfl: 3  •  nitroglycerin (NITROSTAT) 0.4 MG SL tablet, 1 under the tongue as needed for angina, may repeat q5mins for up three doses, Disp: 25 tablet, Rfl: 11      /82   Pulse 64   Temp 98.2 °F (36.8 °C)   Ht 177.8 cm (70\")   Wt 85.7 kg (189 lb)   SpO2 97%   BMI 27.12 kg/m²      Body mass index is 27.12 kg/m².         Physical Exam  Vitals and nursing note reviewed. "   Constitutional:       Appearance: Normal appearance.   HENT:      Head: Normocephalic and atraumatic.   Eyes:      Pupils: Pupils are equal, round, and reactive to light.   Cardiovascular:      Rate and Rhythm: Normal rate and regular rhythm.      Pulses: Normal pulses.      Heart sounds: Normal heart sounds.   Pulmonary:      Effort: Pulmonary effort is normal.      Breath sounds: Normal breath sounds.   Abdominal:      General: Bowel sounds are normal.      Palpations: Abdomen is soft.   Musculoskeletal:         General: Tenderness present. Normal range of motion.      Cervical back: Normal range of motion and neck supple.      Right knee: Swelling and crepitus present. Tenderness present over the medial joint line and lateral joint line. No LCL laxity, MCL laxity, ACL laxity or PCL laxity. Normal alignment. Normal pulse.      Comments: Reports tenderness is being very low.  No abnormalities noted in ambulation   Skin:     General: Skin is warm and dry.   Neurological:      General: No focal deficit present.      Mental Status: He is alert and oriented to person, place, and time. Mental status is at baseline.      Motor: No weakness.      Gait: Gait normal.   Psychiatric:         Mood and Affect: Mood normal.         Behavior: Behavior normal.         Thought Content: Thought content normal.         Judgment: Judgment normal.               Assessment & Plan   Diagnoses and all orders for this visit:    1. Pain and swelling of right knee (Primary)               Plan of care reviewed with Mr. Edwards  Blood pressure is a little elevated however took his blood pressure medicine less than an hour ago.    Pain has already significantly improved with use of NSAIDs, advised it is okay for him to use topical NSAIDs and IcyHot, may also use heat/cold 20 minutes on 20 minutes off, advised to continue to ambulate per usual but to avoid activities such as shoveling snow to avoid further aggravation.  Advised that  symptoms would likely continue to improve with precautions, however if symptoms increase in severity or do not resolve completely in the next several weeks to please reach out we will proceed with an imaging orthopedic referral.  Keep next scheduled appointment, can be seen before this as needed.

## 2023-01-03 ENCOUNTER — OFFICE VISIT (OUTPATIENT)
Dept: INTERNAL MEDICINE | Facility: CLINIC | Age: 73
End: 2023-01-03
Payer: MEDICARE

## 2023-01-03 VITALS
HEIGHT: 70 IN | OXYGEN SATURATION: 98 % | DIASTOLIC BLOOD PRESSURE: 68 MMHG | SYSTOLIC BLOOD PRESSURE: 122 MMHG | BODY MASS INDEX: 26.77 KG/M2 | HEART RATE: 67 BPM | TEMPERATURE: 97.9 F | WEIGHT: 187 LBS

## 2023-01-03 DIAGNOSIS — E78.5 HYPERLIPIDEMIA LDL GOAL <70: Chronic | ICD-10-CM

## 2023-01-03 DIAGNOSIS — I10 BENIGN HYPERTENSION: Primary | Chronic | ICD-10-CM

## 2023-01-03 DIAGNOSIS — E11.9 DIET-CONTROLLED DIABETES MELLITUS: Chronic | ICD-10-CM

## 2023-01-03 DIAGNOSIS — R29.818 SUSPECTED SLEEP APNEA: ICD-10-CM

## 2023-01-03 DIAGNOSIS — S86.911S KNEE STRAIN, RIGHT, SEQUELA: ICD-10-CM

## 2023-01-03 PROCEDURE — 99214 OFFICE O/P EST MOD 30 MIN: CPT | Performed by: INTERNAL MEDICINE

## 2023-01-03 PROCEDURE — 1159F MED LIST DOCD IN RCRD: CPT | Performed by: INTERNAL MEDICINE

## 2023-01-03 PROCEDURE — 1160F RVW MEDS BY RX/DR IN RCRD: CPT | Performed by: INTERNAL MEDICINE

## 2023-01-03 RX ORDER — PERPHENAZINE 16 MG/1
TABLET, FILM COATED ORAL
Refills: 3 | OUTPATIENT
Start: 2023-01-03

## 2023-01-03 NOTE — ASSESSMENT & PLAN NOTE
Hypertension is improving with treatment.  Continue current treatment regimen.  Blood pressure will be reassessed at the next regular appointment.    Patient's blood pressure is well controlled.  Patient does not routinely check his blood pressure at home.  Goal for blood pressure less than 130/80.

## 2023-01-03 NOTE — ASSESSMENT & PLAN NOTE
Patient was just recently seen by Brittany in the office for right knee strain.  Patient daughter reports that is getting bigger.  Patient does have some prepatellar effusion.  Patient has taken naproxen.  Recommend he take this 2 times daily, as well as rest elevate and ice.

## 2023-01-03 NOTE — ASSESSMENT & PLAN NOTE
Diabetes is improving with lifestyle modifications.   Dietary recommendations for ADA diet.  Regular aerobic exercise.  Discussed foot care.  Reminded to get yearly retinal exam.  Diabetes will be reassessed in 3 months.

## 2023-01-03 NOTE — ASSESSMENT & PLAN NOTE
Lipid abnormalities are improving with treatment.  Pharmacotherapy as ordered.  Lipids will be reassessed Next visit..    Patient's LDL and total cholesterol were both reviewed and noted to be within goal.  Patient's HDL is also excellent at 60.

## 2023-01-03 NOTE — ASSESSMENT & PLAN NOTE
Patient actually has a sleep study scheduled for [facility] at home.  Patient had questions regarding sleep apnea and treatment.  He asked if any of the devices that have been shown on TV are beneficial, discussed with him that positive pressure ventilation if diagnosed with sleep apnea is the most beneficial treatment, the others may be helpful if he is unable to tolerate positive pressure ventilation.  Told him we can address further after his sleep study has been reported.

## 2023-01-03 NOTE — PROGRESS NOTES
Chief Complaint  Hypertension (3 month follow up ), Diabetes (Labs in chart ), and Joint Swelling (Pt was in on 12/30/2022 with right knee pain and swelling )    Subjective        Geoff Edwards presents to [facility] PRIMARY [facility]ARE    HPI    Patient was seen for the above problems.  Labs were reviewed with the patient.  Patient is doing very well, he continues to follow the treatment regimen prescribed, A1c may drop outside the diabetic range.  Patient is due for diabetic eye exam within the next couple of months per his recollection.      Review of Systems   Constitutional: Negative for activity change, appetite change, fatigue and fever.   Respiratory: Negative for cough and shortness of breath.    Cardiovascular: Negative for chest pain and palpitations.   Gastrointestinal: Negative for abdominal distention, diarrhea and vomiting.   Musculoskeletal: Positive for arthralgias (right knee). Negative for myalgias.       Objective   Vital Signs:  /68 (BP Location: Left arm, Patient Position: Sitting, Cuff Size: Adult)   Pulse 67   Temp 97.9 °F (36.6 °C) (Temporal)   Ht 177.8 cm (70\")   Wt 84.8 kg (187 lb)   SpO2 98%   BMI 26.83 kg/m²   Estimated body mass index is 26.83 kg/m² as calculated from the following:    Height as of this encounter: 177.8 cm (70\").    Weight as of this encounter: 84.8 kg (187 lb).      Physical Exam  Vitals reviewed.   Constitutional:       Appearance: He is not ill-appearing.   HENT:      Head: Normocephalic and atraumatic.      Mouth/Throat:      Mouth: Mucous membranes are dry.      Pharynx: Oropharynx is clear.   Eyes:      General: No scleral icterus.     Conjunctiva/sclera: Conjunctivae normal.   Cardiovascular:      Rate and Rhythm: Normal rate and regular rhythm.      Heart sounds: No murmur heard.  Pulmonary:      Effort: Pulmonary effort is normal. No respiratory distress.      Breath sounds: Normal breath sounds. No stridor.   Abdominal:       General: Abdomen is flat. Bowel sounds are normal. There is no distension.      Palpations: Abdomen is soft. There is no mass.      Tenderness: There is no abdominal tenderness.      Hernia: No hernia is present.   Feet:      Comments: Diabetic Foot Exam Performed and Monofilament Test Performed    No calloses.  Normal monofilament    Skin:     General: Skin is warm and dry.      Coloration: Skin is not jaundiced or pale.   Neurological:      General: No focal deficit present.      Mental Status: He is alert and oriented to person, place, and time.   Psychiatric:         Mood and Affect: Mood normal.         Behavior: Behavior normal.           Assessment and Plan   Diagnoses and all orders for this visit:    1. Benign hypertension (Primary)  Assessment & Plan:  Hypertension is improving with treatment.  Continue current treatment regimen.  Blood pressure will be reassessed at the next regular appointment.    Patient's blood pressure is well controlled.  Patient does not routinely check his blood pressure at home.  Goal for blood pressure less than 130/80.      2. Diet-controlled diabetes mellitus (HCC)  Assessment & Plan:  Diabetes is improving with lifestyle modifications.   Dietary recommendations for ADA diet.  Regular aerobic exercise.  Discussed foot care.  Reminded to get yearly retinal exam.  Diabetes will be reassessed in 3 months.        3. Hyperlipidemia LDL goal <70  Assessment & Plan:  Lipid abnormalities are improving with treatment.  Pharmacotherapy as ordered.  Lipids will be reassessed Next visit..    Patient's LDL and total cholesterol were both reviewed and noted to be within goal.  Patient's HDL is also excellent at 60.      4. Suspected sleep apnea  Assessment & Plan:  Patient actually has a sleep study scheduled for tonight at home.  Patient had questions regarding sleep apnea and treatment.  He asked if any of the devices that have been shown on TV are beneficial, discussed with him that  positive pressure ventilation if diagnosed with sleep apnea is the most beneficial treatment, the others may be helpful if he is unable to tolerate positive pressure ventilation.  Told him we can address further after his sleep study has been reported.      5. Knee strain, right, sequela  Assessment & Plan:  Patient was just recently seen by Brittany in the office for right knee strain.  Patient daughter reports that is getting bigger.  Patient does have some prepatellar effusion.  Patient has taken naproxen.  Recommend he take this 2 times daily, as well as rest elevate and ice.          Result Review :  The following data was reviewed by: Ochoa Hairston MD on 01/03/2023:  CMP    CMP 5/11/22 9/29/22 12/29/22   Glucose 107 (A) 110 (A) 111 (A)   BUN 15 15 15   Creatinine 1.24 1.21 1.18   Sodium 140 142 142   Potassium 4.0 4.5 4.1   Chloride 108 (A) 106 106   Calcium 9.3 9.3 8.7   Total Protein 6.4 6.4    Albumin 3.90 4.30    Globulin 2.5 2.1    Total Bilirubin 1.1 1.1    Alkaline Phosphatase 75 82    AST (SGOT) 18 26    ALT (SGPT) 18 31    BUN/Creatinine Ratio 12.1 12.4 12.7   (A) Abnormal value            CBC w/diff    CBC w/Diff 4/8/22   WBC 6.10   RBC 4.26   Hemoglobin 13.5   Hematocrit 40.1   MCV 94.1   MCH 31.7   MCHC 33.7   RDW 14.0   Platelets 160   Neutrophil Rel % 51.0   Lymphocyte Rel % 33.0   Monocyte Rel % 8.9   Eosinophil Rel % 5.6   Basophil Rel % 1.3           Lipid Panel    Lipid Panel 4/8/22 9/29/22 12/29/22   Total Cholesterol 128 122 138   Triglycerides 50 43 54   HDL Cholesterol 57 62 (A) 60   VLDL Cholesterol 11 11 12   LDL Cholesterol  60 49 66   (A) Abnormal value       Comments are available for some flowsheets but are not being displayed.           Most Recent A1C    HGBA1C Most Recent 12/29/22   Hemoglobin A1C 6.60 (A)   (A) Abnormal value       Comments are available for some flowsheets but are not being displayed.                 BMI is >= 25 and <30. (Overweight) The following options  were offered after discussion;: exercise counseling/recommendations and nutrition counseling/recommendations           Follow Up   Return in about 3 months (around 4/3/2023), or if symptoms worsen or fail to improve, for Recheck, Next scheduled follow up.  Patient was given instructions and counseling regarding his condition or for health maintenance advice. Please see specific information pulled into the AVS if appropriate.       CYNTHIA Hairston MD, FH, FACP      Electronically signed by Ochoa Hairston MD, 01/03/23, 8:00 AM CST.

## 2023-01-04 ENCOUNTER — HOSPITAL ENCOUNTER (OUTPATIENT)
Dept: SLEEP MEDICINE | Facility: HOSPITAL | Age: 73
Discharge: HOME | End: 2023-01-04
Admitting: NURSE PRACTITIONER
Payer: MEDICARE

## 2023-01-04 DIAGNOSIS — G47.19 DAYTIME HYPERSOMNOLENCE: ICD-10-CM

## 2023-01-04 PROCEDURE — 95806 SLEEP STUDY UNATT&RESP EFFT: CPT

## 2023-01-04 PROCEDURE — 95806 SLEEP STUDY UNATT&RESP EFFT: CPT | Performed by: PSYCHIATRY & NEUROLOGY

## 2023-01-09 ENCOUNTER — TELEPHONE (OUTPATIENT)
Dept: NEUROLOGY | Facility: CLINIC | Age: 73
End: 2023-01-09
Payer: MEDICARE

## 2023-01-09 NOTE — TELEPHONE ENCOUNTER
Caller: Geoff Edwards    Relationship: Self    Best call back number: [phone]    Who are you requesting to speak with (clinical staff, provider,  specific staff member): TAMRA    What was the call regarding:   HOME SLEEP STUDY ON 1-4-23  PT WOULD LIKE TO DISCUSS THE RESULTS WITH TAMRA. PT IS ASKING IF THE HOME TEST RESULTS ARE AS GOOD AS DOING THE TEST AT THE HOSPITAL.    PT STATES HE THOUGHT THERE WAS AN ISSUE WITH THE MACHINE HE USED FOR THE SLEEP STUDY. PT CALLED THE PLACE WHERE HE GOT THE MACHINE AND WAS TOLD THERE WASN'T ANY PROBLEMS WITH THE MACHINE.    PT IS WANTING TO KNOW IF IT IS POSSIBLE FOR HIM TO TAKE THE TEST AGAIN?    PT IS ALSO WANTING TO KNOW HOW LONG HE ACTUALLY SLEEP DURING THE TEST?    Do you require a callback:   YES, PLEASE.

## 2023-01-10 NOTE — TELEPHONE ENCOUNTER
CALLED PATIENT TO LET HIM KNOW THAT I HAD LET TAMRA KNOW HIS CONCERNS. LET HIM KNOW TAMRA STATED HE DIDN'T FEEL IT WAS NECESSARY TO DO AN IN LAB SLEEP STUDY BECAUSE THE HOME SLEEP STUDY SHOWED SLEEP APNEA AND DOESN'T THINK THAT COMING IN LAB WOULD DIFFER THE RESULTS. PATIENT VOICED UNDERSTANDING.

## 2023-01-19 ENCOUNTER — OFFICE VISIT (OUTPATIENT)
Dept: CARDIOLOGY | Facility: CLINIC | Age: 73
End: 2023-01-19
Payer: MEDICARE

## 2023-01-19 VITALS
BODY MASS INDEX: 26.63 KG/M2 | WEIGHT: 186 LBS | SYSTOLIC BLOOD PRESSURE: 146 MMHG | DIASTOLIC BLOOD PRESSURE: 90 MMHG | HEIGHT: 70 IN | HEART RATE: 60 BPM

## 2023-01-19 DIAGNOSIS — E78.5 HYPERLIPIDEMIA LDL GOAL <70: ICD-10-CM

## 2023-01-19 DIAGNOSIS — I47.1 PAROXYSMAL SVT (SUPRAVENTRICULAR TACHYCARDIA): ICD-10-CM

## 2023-01-19 DIAGNOSIS — E11.9 DIET-CONTROLLED DIABETES MELLITUS: ICD-10-CM

## 2023-01-19 DIAGNOSIS — I25.10 NON-OCCLUSIVE CORONARY ARTERY DISEASE: Primary | ICD-10-CM

## 2023-01-19 DIAGNOSIS — I10 PRIMARY HYPERTENSION: ICD-10-CM

## 2023-01-19 DIAGNOSIS — G47.33 OBSTRUCTIVE SLEEP APNEA: ICD-10-CM

## 2023-01-19 PROCEDURE — 99214 OFFICE O/P EST MOD 30 MIN: CPT | Performed by: NURSE PRACTITIONER

## 2023-01-19 RX ORDER — NITROGLYCERIN 0.4 MG/1
TABLET SUBLINGUAL
Qty: 20 TABLET | Refills: 1 | Status: SHIPPED | OUTPATIENT
Start: 2023-01-19

## 2023-01-19 NOTE — PROGRESS NOTES
Subjective:     Encounter Date: 01/19/2023      Patient ID: Geoff Edwards is a 72 y.o. male with non obstructive coronary artery disease, hypertension, and obstructive sleep apnea    Chief Complaint: 3 month follow up  Hypertension  This is a chronic problem. The current episode started more than 1 year ago. The problem is controlled. Pertinent negatives include no anxiety, chest pain, headaches, malaise/fatigue, orthopnea, palpitations, peripheral edema, PND or shortness of breath. Risk factors for coronary artery disease include dyslipidemia, diabetes mellitus and male gender. Current antihypertension treatment includes calcium channel blockers and angiotensin blockers. Hypertensive end-organ damage includes CAD/MI. Identifiable causes of hypertension include sleep apnea.   Hyperlipidemia  This is a chronic problem. The current episode started more than 1 year ago. The problem is controlled. Exacerbating diseases include diabetes. Pertinent negatives include no chest pain or shortness of breath. Current antihyperlipidemic treatment includes statins. Risk factors for coronary artery disease include dyslipidemia, hypertension and male sex.     Patient presents today for management of non obstructive coronary artery disease. He reports that he has been feeling well. He reports that he works part time at [employer]. He denies any chest pain. He denies any fatigue, decreased stamina or dyspnea on exertion. He denies any leg swelling, orthopnea or PND. He denies any palpitations or heart racing. Patient denies any dizziness and near syncope. Patient follows with Dr Hairston as PCP.     The following portions of the patient's history were reviewed and updated as appropriate: allergies, current medications, past family history, past medical history, past social history, past surgical history and problem list.    Allergies   Allergen Reactions   • Ace Inhibitors Cough       Current Outpatient Medications:   •   amLODIPine (NORVASC) 5 MG tablet, TAKE 1 TABLET BY MOUTH DAILY., Disp: 30 tablet, Rfl: 11  •  aspirin (aspirin) 81 MG EC tablet, Take 1 tablet by mouth Daily., Disp: 30 tablet, Rfl: 11  •  cloNIDine (Catapres) 0.1 MG tablet, Take 1 tablet by mouth 2 (Two) Times a Day As Needed for High Blood Pressure (> 150/90). (Patient taking differently: Take 0.1 mg by mouth Daily.), Disp: 60 tablet, Rfl: 11  •  Contour Next Test test strip, USE ONE EVERY DAY, Disp: 100 each, Rfl: 3  •  ipratropium (ATROVENT) 0.06 % nasal spray, 2 sprays into the nostril(s) as directed by provider 4 (Four) Times a Day As Needed for Rhinitis. For drainage, Disp: 45 mL, Rfl: 3  •  irbesartan (AVAPRO) 300 MG tablet, TAKE 1 TABLET BY MOUTH DAILY, Disp: 90 tablet, Rfl: 1  •  Lancets Ultra Thin misc, 1 (ONE) MISC DAILY, Disp: 100 each, Rfl: 3  •  nitroglycerin (NITROSTAT) 0.4 MG SL tablet, 1 under the tongue as needed for angina, may repeat q5mins for up three doses, Disp: 20 tablet, Rfl: 1  •  rosuvastatin (CRESTOR) 5 MG tablet, TAKE 1 TABLET BY MOUTH DAILY., Disp: 90 tablet, Rfl: 3  Past Medical History:   Diagnosis Date   • Abnormal PSA    • Chest pain    • Diabetes mellitus (HCC)     diet controlled   • Elevated cholesterol    • Hypertension    • PONV (postoperative nausea and vomiting)     SEVERE   • Seasonal allergies    • Sleep apnea      Social History     Socioeconomic History   • Marital status:    Tobacco Use   • Smoking status: Never   • Smokeless tobacco: Never   Vaping Use   • Vaping Use: Never used   Substance and Sexual Activity   • Alcohol use: No   • Drug use: No   • Sexual activity: Defer       Review of Systems   Constitutional: Negative for malaise/fatigue.   HENT: Negative for hoarse voice.    Cardiovascular: Negative for chest pain, dyspnea on exertion, irregular heartbeat, leg swelling, near-syncope, orthopnea, palpitations and paroxysmal nocturnal dyspnea.   Respiratory: Negative for shortness of breath.   "  Hematologic/Lymphatic: Does not bruise/bleed easily.   Genitourinary: Negative for hematuria.   Neurological: Negative for dizziness and headaches.   All other systems reviewed and are negative.         Objective:     Vitals reviewed.   Constitutional:       General: Not in acute distress.     Appearance: Normal appearance. Well-developed.   Eyes:      Pupils: Pupils are equal, round, and reactive to light.   HENT:      Head: Normocephalic and atraumatic.      Nose: Nose normal.   Neck:      Vascular: No carotid bruit.   Pulmonary:      Effort: Pulmonary effort is normal. No respiratory distress.      Breath sounds: Normal breath sounds. No wheezing. No rales.   Cardiovascular:      Normal rate. Regular rhythm.      Murmurs: There is no murmur.   Edema:     Peripheral edema absent.   Abdominal:      General: There is no distension.      Palpations: Abdomen is soft.   Musculoskeletal: Normal range of motion.      Cervical back: Normal range of motion and neck supple. Skin:     General: Skin is warm.      Findings: No erythema or rash.   Neurological:      General: No focal deficit present.      Mental Status: Alert and oriented to person, place, and time.   Psychiatric:         Attention and Perception: Attention normal.         Mood and Affect: Mood normal.         Speech: Speech normal.         Behavior: Behavior normal.         Thought Content: Thought content normal.         Judgment: Judgment normal.         /90   Pulse 60   Ht 177.8 cm (70\")   Wt 84.4 kg (186 lb)   BMI 26.69 kg/m²     Procedures    Lab Review:       Lab Results   Component Value Date    CHLPL 138 12/29/2022    TRIG 54 12/29/2022    HDL 60 12/29/2022    LDL 66 12/29/2022     Lab Results   Component Value Date    HGBA1C 6.60 (H) 12/29/2022     Results for orders placed during the hospital encounter of 03/02/22    Adult Transthoracic Echo Complete w/ Color, Spectral and Contrast if necessary per protocol    Interpretation Summary  · " Left ventricular wall thickness is consistent with mild concentric hypertrophy.  · Left ventricular ejection fraction appears to be 61 - 65%. Left ventricular systolic function is normal.  · Abnormal global longitudinal LV strain (GLS) = -15.0%  · Left ventricular diastolic function was normal.  · Estimated right ventricular systolic pressure from tricuspid regurgitation is normal (<35 mmHg).    Conclusion of cardiac catheterization    8/5/2021        Normal left main coronary artery  Mid left anterior descending coronary artery has 30 to 40% stenosis with moderate atherosclerotic plaque with normal FFR of 0.86  FFR performed as patient has exertional chest pain and had abnormal stress echo suggestive of left anterior descending coronary artery distribution ischemia  Normal left circumflex coronary artery  Normal right coronary artery  Moderate tortuosity of epicardial vessels due to uncontrolled hypertension  Mildly elevated left ventricular end-diastolic pressure of 14 mmHg.     ____________________________________________________________________________________________________________________________________________     Plan after cardiac catheterization     Medical management  Keep LDL well below 70 mg/dL  Start on Crestor 5 mg p.o. daily  Start amlodipine 5 mg p.o. daily  Sublingual nitroglycerin as needed for chest pain for possible small vessel disease  Aspirin 81 mg p.o. daily  Monitor blood pressures twice a day at home and bring recordings for office visit  Clonidine 0.1 mg p.o. twice daily as needed for blood pressure more than 150/90 mmHg     Intensive risk factor modifications for both primary and secondary prevention if applicable  Hydration   Observation      I have personally reviewed echo, LHC, labs and past office notes prior to patients visit  Assessment:          Diagnosis Plan   1. Non-occlusive coronary artery disease        2. Primary hypertension        3. Diet-controlled diabetes mellitus  (HCC)        4. Hyperlipidemia LDL goal <70        5. Paroxysmal SVT (supraventricular tachycardia) ([facility])        6. Obstructive sleep apnea               Plan:       1. Coronary artery disease: non-obstructive disease: C 8/2021 showed 30-40% stenosis with moderate atherosclerotic plaque with normal FFR of 0.86. Continue aspirin, norvasc, rosuvastatin, and irbesartan.     2. Hypertension: Controlled. Continue current medications.     3. Controlled Type 2 DM: A1C 6.6 12/29/2022. Managed and followed by PCP    4. Hyperlipidemia: Lipid panel from 12/29/2022 demonstrates well controlled cholesterol. Continue rosuvastatin    5. Paroxysmal SVT: 31 runs noted on holter monitor 7/2022. Patient denies any symptoms at this time.     6. Obstructive sleep apnea: Received CPAP about a week ago. He is trying to adjust to machine and has been able to use it for 4-5 hours.     I spent 35 minutes caring for Geoff on this date of service. This time includes time spent by me in the following activities:preparing for the visit, reviewing tests, obtaining and/or reviewing a separately obtained history, performing a medically appropriate examination and/or evaluation , ordering medications, tests, or procedures and documenting information in the medical record     Patient is to follow up in 6 months or sooner if needed

## 2023-01-30 RX ORDER — IRBESARTAN 300 MG/1
TABLET ORAL
Qty: 90 TABLET | Refills: 1 | Status: SHIPPED | OUTPATIENT
Start: 2023-01-30

## 2023-03-15 ENCOUNTER — OFFICE VISIT (OUTPATIENT)
Dept: NEUROLOGY | Facility: CLINIC | Age: 73
End: 2023-03-15
Payer: MEDICARE

## 2023-03-15 VITALS
WEIGHT: 184 LBS | DIASTOLIC BLOOD PRESSURE: 82 MMHG | BODY MASS INDEX: 26.34 KG/M2 | HEART RATE: 54 BPM | HEIGHT: 70 IN | OXYGEN SATURATION: 96 % | SYSTOLIC BLOOD PRESSURE: 140 MMHG

## 2023-03-15 DIAGNOSIS — G47.33 OBSTRUCTIVE SLEEP APNEA: Primary | ICD-10-CM

## 2023-03-15 PROCEDURE — 99213 OFFICE O/P EST LOW 20 MIN: CPT | Performed by: NURSE PRACTITIONER

## 2023-03-15 PROCEDURE — 3079F DIAST BP 80-89 MM HG: CPT | Performed by: NURSE PRACTITIONER

## 2023-03-15 PROCEDURE — 1159F MED LIST DOCD IN RCRD: CPT | Performed by: NURSE PRACTITIONER

## 2023-03-15 PROCEDURE — 1160F RVW MEDS BY RX/DR IN RCRD: CPT | Performed by: NURSE PRACTITIONER

## 2023-03-15 PROCEDURE — 3077F SYST BP >= 140 MM HG: CPT | Performed by: NURSE PRACTITIONER

## 2023-03-15 NOTE — PROGRESS NOTES
Neurology Progress Note    Chief Complaint:    Obstructive Sleep Apnea    Subjective   History of Present Illness:  Geoff Edwards is a 72 y.o. male who presents today for obstructive sleep apnea.  He is routinely followed by Ochoa Hairston MD for primary care.     Obstructive Sleep Apnea  Since last being seen he did have home sleep study performed on 1/5/2023 which revealed an AHI of 12.6.  He was subsequently given APAP and has been using it ever since.  He reports that he is doing well with use of his CPAP.  He denies any changes in the way he feels but as you may recall, he really did not feel hypersomnolent during the day for the most part.  There are no witnessed apneas or snoring noted over therapy.    Allergies:    Ace inhibitors    Medications:  Current Outpatient Medications   Medication Sig Dispense Refill   • amLODIPine (NORVASC) 5 MG tablet TAKE 1 TABLET BY MOUTH DAILY. 30 tablet 11   • aspirin (aspirin) 81 MG EC tablet Take 1 tablet by mouth Daily. 30 tablet 11   • cloNIDine (Catapres) 0.1 MG tablet Take 1 tablet by mouth 2 (Two) Times a Day As Needed for High Blood Pressure (> 150/90). (Patient taking differently: Take 1 tablet by mouth Daily.) 60 tablet 11   • Contour Next Test test strip USE ONE EVERY  each 3   • ipratropium (ATROVENT) 0.06 % nasal spray 2 sprays into the nostril(s) as directed by provider 4 (Four) Times a Day As Needed for Rhinitis. For drainage 45 mL 3   • irbesartan (AVAPRO) 300 MG tablet TAKE 1 TABLET BY MOUTH DAILY 90 tablet 1   • Lancets Ultra Thin misc 1 (ONE) MISC DAILY 100 each 3   • nitroglycerin (NITROSTAT) 0.4 MG SL tablet 1 under the tongue as needed for angina, may repeat q5mins for up three doses 20 tablet 1   • rosuvastatin (CRESTOR) 5 MG tablet TAKE 1 TABLET BY MOUTH DAILY. 90 tablet 3     No current facility-administered medications for this visit.     Current outpatient and discharge medications have been reconciled for the patient.  Reviewed  by: MARIA DOLORES Adams    Past Medical History:  Past Medical History:   Diagnosis Date   • Abnormal PSA    • Chest pain    • Diabetes mellitus (HCC)     diet controlled   • Elevated cholesterol    • Hypertension    • PONV (postoperative nausea and vomiting)     SEVERE   • Seasonal allergies    • Sleep apnea      Past Surgical History:   Procedure Laterality Date   • APPENDECTOMY     • CARDIAC CATHETERIZATION     • CARDIAC CATHETERIZATION Left 8/5/2021    Procedure: Cardiac Catheterization/Vascular Study;  Surgeon: Luke Dixon MD;  Location: [facility] CATH INVASIVE LOCATION;  Service: Cardiology;  Laterality: Left;   • COLONOSCOPY  11/16/2012    Normal. Repeat in 5 years. Dr. Tez Perdomo   • COLONOSCOPY N/A 11/21/2017    Procedure: COLONOSCOPY WITH ANESTHESIA;  Surgeon: Tez Perdomo MD;  Location: [facility] ENDOSCOPY;  Service:    • CYSTOSCOPY TRANSURETHRAL RESECTION OF PROSTATE N/A 8/30/2021    Procedure: CYSTOSCOPY TRANSURETHRAL RESECTION OF PROSTATE;  Surgeon: Ric Tobin MD;  Location: [facility] OR;  Service: Urology;  Laterality: N/A;   • MANDIBLE SURGERY     • PROSTATE BIOPSY     • PROSTATE BIOPSY N/A 3/19/2020    Procedure: PROSTATE ULTRASOUND BIOPSY MRI FUSION WITH URONAV;  Surgeon: Ric Tobin MD;  Location: [facility] OR;  Service: Urology;  Laterality: N/A;     Family History   Problem Relation Age of Onset   • Heart disease Father    • No Known Problems Mother    • No Known Problems Brother    • No Known Problems Brother    • No Known Problems Sister    • No Known Problems Sister    • No Known Problems Sister    • No Known Problems Sister    • Stomach cancer Sister    • Cancer Sister 69   • Colon cancer Neg Hx    • Colon polyps Neg Hx      Social History     Tobacco Use   • Smoking status: Never   • Smokeless tobacco: Never   Vaping Use   • Vaping Use: Never used   Substance Use Topics   • Alcohol use: No   • Drug use: No     Review of Systems   Psychiatric/Behavioral: Positive for  "sleep disturbance.         Objective   Vital Signs:  Heart Rate:  [54] 54  BP: (140)/(82) 140/82      03/15/23  0905   Weight: 83.5 kg (184 lb)     177.8 cm (70\")  Body mass index is 26.4 kg/m².    Physical Exam  Vitals reviewed.   Constitutional:       Appearance: Normal appearance.   HENT:      Head: Normocephalic.      Mouth/Throat:      Pharynx: Oropharynx is clear.   Eyes:      General: Lids are normal.      Extraocular Movements: Extraocular movements intact.      Pupils: Pupils are equal, round, and reactive to light.   Cardiovascular:      Rate and Rhythm: Normal rate and regular rhythm.      Pulses: Normal pulses.   Pulmonary:      Effort: Pulmonary effort is normal.   Musculoskeletal:         General: Normal range of motion.      Cervical back: Normal range of motion and neck supple.   Skin:     General: Skin is warm and dry.      Capillary Refill: Capillary refill takes less than 2 seconds.   Neurological:      Motor: Motor strength is normal.      Coordination: Coordination is intact.      Deep Tendon Reflexes: Reflexes are normal and symmetric.   Psychiatric:         Mood and Affect: Mood normal.         Speech: Speech normal.       Neurological Exam  Mental Status  Awake, alert and oriented to person, place and time. Recent and remote memory are intact. Speech is normal. Language is fluent with no aphasia. Attention and concentration are normal.    Cranial Nerves  CN II: Visual acuity is normal. Visual fields full to confrontation.  CN III, IV, VI: Extraocular movements intact bilaterally. Normal lids and orbits bilaterally. Pupils equal round and reactive to light bilaterally.  CN V: Facial sensation is normal.  CN VII: Full and symmetric facial movement.  CN IX, X: Palate elevates symmetrically. Normal gag reflex.  CN XI: Shoulder shrug strength is normal.  CN XII: Tongue midline without atrophy or fasciculations.    Motor   Strength is 5/5 throughout all four extremities.    Sensory  Sensation is " intact to light touch, pinprick, vibration and proprioception in all four extremities.    Reflexes  Deep tendon reflexes are 2+ and symmetric in all four extremities.    Coordination    Finger-to-nose, rapid alternating movements and heel-to-shin normal bilaterally without dysmetria.    Gait  Normal casual, toe, heel and tandem gait.    Neck Circumference: 17.5 inches  Mallampati Score: 3    [place] Sleepiness Scale: 3  STOP-BANG: Intermediate    Compliance Report:  This report is for the dates of 2/13/2023-3/14/2023.  Patient is advised for 30/30 days for 1 her percent compliance.  Of those days patient is advised for 30 days for 1 [place] compliance for greater than 4 hours per night.  Patient is currently set on APAP with pressures 8-16 cm H2O.  Current AHI is 1.3     Results Review:    Lab Results   Component Value Date    GLUCOSE 111 (H) 12/29/2022    BUN 15 12/29/2022    CREATININE 1.18 12/29/2022    EGFRIFNONA 58 (L) 03/18/2021    EGFRIFAFRI 68 08/27/2021    BCR 12.7 12/29/2022    K 4.1 12/29/2022    CO2 26.1 12/29/2022    CALCIUM 8.7 12/29/2022    PROTENTOTREF 6.4 09/29/2022    ALBUMIN 4.30 09/29/2022    LABIL2 2.0 09/29/2022    AST 26 09/29/2022    ALT 31 09/29/2022     Lab Results   Component Value Date    WBC 6.10 04/08/2022    HGB 13.5 04/08/2022    HCT 40.1 04/08/2022    MCV 94.1 04/08/2022     04/08/2022     Lab Results   Component Value Date    CHLPL 138 12/29/2022    TRIG 54 12/29/2022    HDL 60 12/29/2022    LDL 66 12/29/2022     Lab Results   Component Value Date    TSH 3.880 04/08/2022     Lab Results   Component Value Date    HGBA1C 6.60 (H) 12/29/2022     No results found for: FOLATE  No results found for: [id number]    Home Sleep Study (01/05/2023 14:18)     Plan   Assessment:  Geoff Edwards is a 72 y.o. male who presents with obstructive sleep apnea.  He is doing well with use of his CPAP and denies any concerns.  He is getting adequate sleep and does not routinely awaken at  night.  He really does not feel any difference but this is primarily due to the fact that he did not feel hypersomnolent prior to testing.  However, he does have adequate control of his AHI and he continues to use the mask appropriately.  We should continue CPAP therapy at this time.  I discussed the transition of sleep medicine to Dr. Munguia's office and he states complete understanding.    Plan:  • Continue CPAP.  Encouraged Compliance  • Recommend a regular sleep schedule and sleep hygiene  • Discussed risks of untreated sleep apnea  • Recommend regular physical activity and a balanced diet  • Call me with any questions or concerns.    The patient and I have discussed the plan of care and he is in full agreement at this time.     Follow-Up:  • Return in about 1 year (around 3/15/2024) for Obstructive sleep apnea.       Kemar Swanson, MARIA DOLORES  03/15/23  09:31 CDT

## 2023-03-16 ENCOUNTER — OFFICE VISIT (OUTPATIENT)
Dept: INTERNAL MEDICINE | Facility: CLINIC | Age: 73
End: 2023-03-16
Payer: MEDICARE

## 2023-03-16 VITALS
BODY MASS INDEX: 26.6 KG/M2 | TEMPERATURE: 97.2 F | WEIGHT: 190 LBS | SYSTOLIC BLOOD PRESSURE: 120 MMHG | DIASTOLIC BLOOD PRESSURE: 64 MMHG | HEIGHT: 71 IN | HEART RATE: 86 BPM | OXYGEN SATURATION: 98 %

## 2023-03-16 DIAGNOSIS — S46.912S SHOULDER STRAIN, LEFT, SEQUELA: Primary | ICD-10-CM

## 2023-03-16 DIAGNOSIS — S46.812S TRAPEZIUS MUSCLE STRAIN, LEFT, SEQUELA: ICD-10-CM

## 2023-03-16 PROBLEM — Z99.89 OSA ON CPAP: Status: ACTIVE | Noted: 2022-09-21

## 2023-03-16 PROBLEM — S46.811A TRAPEZIUS STRAIN, RIGHT, INITIAL ENCOUNTER: Status: ACTIVE | Noted: 2023-03-16

## 2023-03-16 PROCEDURE — 1159F MED LIST DOCD IN RCRD: CPT | Performed by: INTERNAL MEDICINE

## 2023-03-16 PROCEDURE — 99213 OFFICE O/P EST LOW 20 MIN: CPT | Performed by: INTERNAL MEDICINE

## 2023-03-16 PROCEDURE — 3078F DIAST BP <80 MM HG: CPT | Performed by: INTERNAL MEDICINE

## 2023-03-16 PROCEDURE — 1160F RVW MEDS BY RX/DR IN RCRD: CPT | Performed by: INTERNAL MEDICINE

## 2023-03-16 PROCEDURE — 3074F SYST BP LT 130 MM HG: CPT | Performed by: INTERNAL MEDICINE

## 2023-03-16 RX ORDER — CYCLOBENZAPRINE HCL 5 MG
5 TABLET ORAL 3 TIMES DAILY PRN
Qty: 45 TABLET | Refills: 0 | Status: SHIPPED | OUTPATIENT
Start: 2023-03-16

## 2023-03-16 NOTE — PROGRESS NOTES
"      Chief Complaint  Neck Pain (Noticed it bothering him yesterday. Runs between neck and shoulder on left side/Some right side lower back pain as well /Was helping on Monday move a vending machine )    Subjective        Geoff Edwards presents to [facility]    HPI  Patient works part-time maintenance for [employer].  Patient was helping them with unloading a drink machine to get a new drinking and.  He was doing repetitive motion that was rotational.  The patient subsequently the next day had significant left upper shoulder pain.      Review of Systems   Constitutional: Negative for appetite change, chills and fever.   Respiratory: Negative for cough and shortness of breath.    Cardiovascular: Negative for chest pain and palpitations.   Gastrointestinal: Negative for abdominal distention, abdominal pain, diarrhea, nausea and vomiting.   Musculoskeletal: Negative for arthralgias and myalgias.       Objective   Vital Signs:  /64 (BP Location: Left arm, Patient Position: Sitting, Cuff Size: Adult)   Pulse 86   Temp 97.2 °F (36.2 °C) (Temporal)   Ht 180.3 cm (71\")   Wt 86.2 kg (190 lb)   SpO2 98%   BMI 26.50 kg/m²   Estimated body mass index is 26.5 kg/m² as calculated from the following:    Height as of this encounter: 180.3 cm (71\").    Weight as of this encounter: 86.2 kg (190 lb).      Physical Exam  Vitals reviewed.   Constitutional:       Appearance: He is not ill-appearing.   Eyes:      General: No scleral icterus.     Conjunctiva/sclera: Conjunctivae normal.   Cardiovascular:      Rate and Rhythm: Normal rate and regular rhythm.   Pulmonary:      Effort: Pulmonary effort is normal. No respiratory distress.   Musculoskeletal:      Left shoulder: Tenderness and crepitus present. No swelling, deformity or effusion. Normal strength.        Arms:    Skin:     General: Skin is warm and dry.   Neurological:      General: No focal deficit present.      Mental " Status: He is alert and oriented to person, place, and time.   Psychiatric:         Mood and Affect: Mood normal.         Behavior: Behavior normal.                     Assessment and Plan   Diagnoses and all orders for this visit:    1. Shoulder strain, left, sequela (Primary)  -     cyclobenzaprine (FLEXERIL) 5 MG tablet; Take 1 tablet by mouth 3 (Three) Times a Day As Needed for Muscle Spasms.  Dispense: 45 tablet; Refill: 0    2. Trapezius muscle strain, left, sequela  -     cyclobenzaprine (FLEXERIL) 5 MG tablet; Take 1 tablet by mouth 3 (Three) Times a Day As Needed for Muscle Spasms.  Dispense: 45 tablet; Refill: 0      Showed various stretches  Trial of flexeril  Discussed ischemic pressure  Recommended volteran gel and salanpas patches   Patient appears to have some osteoarthritis in the shoulder, may benefit in the future from a shoulder injection.     Result Review :                      Follow Up   Return if symptoms worsen or fail to improve, for Next scheduled follow up.  Patient was given instructions and counseling regarding his condition or for health maintenance advice. Please see specific information pulled into the AVS if appropriate.       CYNTHIA Hairston MD, FACP, [facility]      Electronically signed by Ochoa Hairston MD, 03/16/23, 5:29 PM CDT.

## 2023-03-17 ENCOUNTER — HOSPITAL ENCOUNTER (EMERGENCY)
Facility: HOSPITAL | Age: 73
Discharge: HOME | End: 2023-03-17
Attending: STUDENT IN AN ORGANIZED HEALTH CARE EDUCATION/TRAINING PROGRAM | Admitting: STUDENT IN AN ORGANIZED HEALTH CARE EDUCATION/TRAINING PROGRAM
Payer: MEDICARE

## 2023-03-17 VITALS
BODY MASS INDEX: 27.16 KG/M2 | OXYGEN SATURATION: 99 % | WEIGHT: 194 LBS | SYSTOLIC BLOOD PRESSURE: 139 MMHG | TEMPERATURE: 98.8 F | DIASTOLIC BLOOD PRESSURE: 81 MMHG | RESPIRATION RATE: 18 BRPM | HEIGHT: 71 IN | HEART RATE: 68 BPM

## 2023-03-17 DIAGNOSIS — M54.2 NECK PAIN: Primary | ICD-10-CM

## 2023-03-17 PROCEDURE — 99283 EMERGENCY DEPT VISIT LOW MDM: CPT

## 2023-03-17 PROCEDURE — 96372 THER/PROPH/DIAG INJ SC/IM: CPT

## 2023-03-17 PROCEDURE — 25010000002 KETOROLAC TROMETHAMINE PER 15 MG: Performed by: STUDENT IN AN ORGANIZED HEALTH CARE EDUCATION/TRAINING PROGRAM

## 2023-03-17 RX ORDER — OXYCODONE AND ACETAMINOPHEN 7.5; 325 MG/1; MG/1
1 TABLET ORAL ONCE
Status: COMPLETED | OUTPATIENT
Start: 2023-03-17 | End: 2023-03-17

## 2023-03-17 RX ORDER — KETOROLAC TROMETHAMINE 30 MG/ML
30 INJECTION, SOLUTION INTRAMUSCULAR; INTRAVENOUS ONCE
Status: COMPLETED | OUTPATIENT
Start: 2023-03-17 | End: 2023-03-17

## 2023-03-17 RX ADMIN — KETOROLAC TROMETHAMINE 30 MG: 30 INJECTION, SOLUTION INTRAMUSCULAR; INTRAVENOUS at 07:36

## 2023-03-17 RX ADMIN — OXYCODONE HYDROCHLORIDE AND ACETAMINOPHEN 1 TABLET: 7.5; 325 TABLET ORAL at 07:35

## 2023-03-17 NOTE — DISCHARGE INSTRUCTIONS
Please come back for headache, visual disturbance, worsening pain, weakness of your arms or legs, numbness or tingling, or any other emergencies.    You can take 600mg ibuprofen (three pills) every 6-8 hours as needed for pain and 1000mg tylenol (two extra strength tablets) every 6-8 hours as needed for pain

## 2023-03-17 NOTE — ED PROVIDER NOTES
Subjective   History of Present Illness   Patient presents due to left-sided shoulder and neck pain.  Monday he strained his left arm while he was unloading a vending machine and loading a new vending machine.  He did not have much pain that day.  Tuesday morning when he woke up he had some left shoulder pain.  He rates it as a 5 out of 10.  Felt like pressure.  Wednesday it was about the same and Thursday felt little bit worse.  He went to the doctor who tried a shoulder massage which made his pain worse and he has some left-sided neck pain as well.  Today he has pain whenever he lifts his head up off the pillow or whenever he turns his head.  He has not had any headache, visual disturbance, numbness, tingling, arm or leg weakness, changes in urinary or bowel habits.  Does not have a history of arthritis.  Past medical history includes hypertension for which he takes a blood pressure pill.  No falls or injuries.  He does not have any chest pain, diaphoresis, nausea, trouble breathing.    Review of Systems   Constitutional: Negative for chills and fever.   Respiratory: Negative for cough and shortness of breath.    Cardiovascular: Negative for chest pain, palpitations and leg swelling.   Gastrointestinal: Negative for abdominal pain and vomiting.   Genitourinary: Negative for difficulty urinating and dysuria.   Neurological: Negative for syncope and light-headedness.       Past Medical History:   Diagnosis Date   • Abnormal PSA    • Chest pain    • Diabetes mellitus (HCC)     diet controlled   • Elevated cholesterol    • Hypertension    • PONV (postoperative nausea and vomiting)     SEVERE   • Seasonal allergies    • Sleep apnea        Allergies   Allergen Reactions   • Ace Inhibitors Cough       Past Surgical History:   Procedure Laterality Date   • APPENDECTOMY     • CARDIAC CATHETERIZATION     • CARDIAC CATHETERIZATION Left 8/5/2021    Procedure: Cardiac Catheterization/Vascular Study;  Surgeon: Luke Dixon MD;   Location: [facility] CATH INVASIVE LOCATION;  Service: Cardiology;  Laterality: Left;   • COLONOSCOPY  11/16/2012    Normal. Repeat in 5 years. Dr. Tez Perdomo   • COLONOSCOPY N/A 11/21/2017    Procedure: COLONOSCOPY WITH ANESTHESIA;  Surgeon: Tez Perdomo MD;  Location: [facility] ENDOSCOPY;  Service:    • CYSTOSCOPY TRANSURETHRAL RESECTION OF PROSTATE N/A 8/30/2021    Procedure: CYSTOSCOPY TRANSURETHRAL RESECTION OF PROSTATE;  Surgeon: Ric Tobin MD;  Location: [facility] OR;  Service: Urology;  Laterality: N/A;   • MANDIBLE SURGERY     • PROSTATE BIOPSY     • PROSTATE BIOPSY N/A 3/19/2020    Procedure: PROSTATE ULTRASOUND BIOPSY MRI FUSION WITH URONAV;  Surgeon: Ric Tobin MD;  Location: [facility] OR;  Service: Urology;  Laterality: N/A;       Family History   Problem Relation Age of Onset   • Heart disease Father    • No Known Problems Mother    • No Known Problems Brother    • No Known Problems Brother    • No Known Problems Sister    • No Known Problems Sister    • No Known Problems Sister    • No Known Problems Sister    • Stomach cancer Sister    • Cancer Sister 69   • Colon cancer Neg Hx    • Colon polyps Neg Hx        Social History     Socioeconomic History   • Marital status:    Tobacco Use   • Smoking status: Never   • Smokeless tobacco: Never   Vaping Use   • Vaping Use: Never used   Substance and Sexual Activity   • Alcohol use: No   • Drug use: No   • Sexual activity: Defer           Objective   Physical Exam  Vitals reviewed.   Constitutional:       General: He is not in acute distress.  HENT:      Head: Normocephalic and atraumatic.   Eyes:      Extraocular Movements: Extraocular movements intact.      Conjunctiva/sclera: Conjunctivae normal.   Cardiovascular:      Pulses: Normal pulses.      Heart sounds: Normal heart sounds.   Pulmonary:      Effort: Pulmonary effort is normal. No respiratory distress.   Abdominal:      General: Abdomen is flat. There is no distension.    Musculoskeletal:      Cervical back: Normal range of motion and neck supple.      Comments: Left trapezius tenderness palpation.  No clavicular tenderness or acromion tenderness palpation.  No midline C-spine tenderness palpation but there is bilateral paraspinal C-spine tenderness to palpation.  Able to tolerate range of motion of the neck with paraspinal pain particular on the left.   Skin:     General: Skin is warm and dry.   Neurological:      General: No focal deficit present.      Mental Status: He is alert. Mental status is at baseline.      Comments: Right upper extremity: 5/5 strength with handgrip and flexion/extension of shoulders, elbows.   Light touch sensation intact and equal when compared to the left upper extremity.    Left upper extremity: 5/5 strength with handgrip and flexion/extension of shoulders, elbows.   Light touch sensation intact and equal when compared to the right upper extremity.    Right lower extremity: 5/5 strength with flexion/extension of hips, knees, and dorsi/plantarflexion of ankles. Able to wiggle toes.   Light touch sensation intact and equal when compared to the left lower extremity.    Left lower extremity: 5/5 strength with flexion/extension of hips, knees, and dorsi/plantarflexion of ankles. Able to wiggle toes.   Light touch sensation intact and equal when compared to the right lower extremity.    Light sensation intact in bilateral face. CN 2-12 normal.   Psychiatric:         Behavior: Behavior normal.         Thought Content: Thought content normal.         Procedures           ED Course                                           MDM   Geoff Edwards is a 72 y.o. male with PMH above who presents to the Emergency Department with neck pain.  Neurologic exam is normal and there is no sign of cord compression or symptomatic aneurysm based on the history and physical above.  He also does not have any cardiac equivalency symptoms to suggest ACS.  He has a clear  inciting injury with gradually worsening pain which worsened upon physical intervention yesterday.  He does not have any kidney disease; plan is for Toradol injection, single dose of pain medicine, and at home muscle relaxers and Tylenol and ibuprofen as well as gentle stretching.  Discussed follow-up if symptoms are not improved.  Also endorsed return precautions.    Final diagnosis: neck pain    All questions answered. Patient/family was understanding and in agreement with today's assessment and plan. The patient was monitored during their stay in the ED and dispositioned without acute event.    Electronically signed by:  Dmitri Rivers MD 3/17/2023 07:15 CDT      Note: Dragon medical dictation software was used in the creation of this note.        Final diagnoses:   None       ED Disposition  ED Disposition     None          No follow-up provider specified.       Medication List      No changes were made to your prescriptions during this visit.          Dmitri Rivers MD  03/17/23 0921

## 2023-03-18 ENCOUNTER — NURSE TRIAGE (OUTPATIENT)
Dept: CALL CENTER | Facility: HOSPITAL | Age: 73
End: 2023-03-18
Payer: MEDICARE

## 2023-03-18 NOTE — TELEPHONE ENCOUNTER
Caller reports was seen by Dr. Hairston Thursday for neck strane and again yesterday in ED. Questioning whether he is to take Flexeril (prescribed per Dr. Teresa) in addition to Tylenol and Ibuprofen.    Epic chart reviewed, per Dr. Rivers note, plan is for at home muscle relaxers and Tylenol and ibuprofen as well as gentle stretching. This reinforced with caller who verbalized understanding.    Reason for Disposition  • Caller has medicine question only, adult not sick, AND triager answers question    Additional Information  • Negative: [1] Intentional drug overdose AND [2] suicidal thoughts or ideas  • Negative: Drug overdose and triager unable to answer question  • Negative: Caller requesting information unrelated to medicine  • Negative: Caller requesting information about COVID-19 Vaccine  • Negative: Caller requesting information about Emergency Contraception  • Negative: Caller requesting information about Combined Birth Control Pills  • Negative: Caller requesting information about Progestin Birth Control Pills  • Negative: Caller requesting information about Post-Op pain or medicines  • Negative: Caller requesting a prescription antibiotic (such as Penicillin) for Strep throat and has a positive culture result  • Negative: Caller requesting a prescription anti-viral med (such as Tamiflu) and has influenza (flu)  symptoms  • Negative: Immunization reaction suspected  • Negative: Rash while taking a medicine or within 3 days of stopping it  • Negative: [1] Asthma and [2] having symptoms of asthma (cough, wheezing, etc.)  • Negative: [1] Symptom of illness (e.g., headache, abdominal pain, earache, vomiting) AND [2] more than mild  • Negative: Breastfeeding questions about mother's medicines and diet  • Negative: MORE THAN A DOUBLE DOSE of a prescription or over-the-counter (OTC) drug  • Negative: [1] DOUBLE DOSE (an extra dose or lesser amount) of prescription drug AND [2] any symptoms (e.g., dizziness,  nausea, pain, sleepiness)  • Negative: [1] DOUBLE DOSE (an extra dose or lesser amount) of over-the-counter (OTC) drug AND [2] any symptoms (e.g., dizziness, nausea, pain, sleepiness)  • Negative: Took another person's prescription drug  • Negative: [1] DOUBLE DOSE (an extra dose or lesser amount) of prescription drug AND [2] NO symptoms (Exception: a double dose of antibiotics)  • Negative: Diabetes drug error or overdose (e.g., took wrong type of insulin or took extra dose)  • Negative: [1] Prescription refill request for ESSENTIAL medicine (i.e., likelihood of harm to patient if not taken) AND [2] triager unable to refill per department policy  • Negative: [1] Prescription not at pharmacy AND [2] was prescribed by PCP recently (Exception: triager has access to EMR and prescription is recorded there. Go to Home Care and confirm for pharmacy.)  • Negative: [1] Pharmacy calling with prescription question AND [2] triager unable to answer question  • Negative: [1] Caller has URGENT medicine question about med that PCP or specialist prescribed AND [2] triager unable to answer question  • Negative: Medicine patch causing local rash or itching  • Negative: [1] Caller has medicine question about med NOT prescribed by PCP AND [2] triager unable to answer question (e.g., compatibility with other med, storage)  • Negative: Prescription request for new medicine (not a refill)  • Negative: Prescription refill request for a CONTROLLED substance (e.g., narcotics, ADHD medicines)  • Negative: [1] Prescription refill request for NON-ESSENTIAL medicine (i.e., no harm to patient if med not taken) AND [2] triager unable to refill per department policy  • Negative: [1] Caller has NON-URGENT medicine question about med that PCP prescribed AND [2] triager unable to answer question  • Negative: Caller wants to use a complementary or alternative medicine  • Negative: [1] Prescription prescribed recently is not at pharmacy AND [2] triager  "has access to patient's EMR AND [3] prescription is recorded in the EMR  • Negative: [1] DOUBLE DOSE (an extra dose or lesser amount) of over-the-counter (OTC) drug AND [2] NO symptoms  • Negative: [1] DOUBLE DOSE (an extra dose or lesser amount) of antibiotic drug AND [2] NO symptoms  • Negative: Caller has medicine question, adult has minor symptoms, caller declines triage, AND triager answers question  • Negative: Caller requesting information about medicine during pregnancy; adult is not ill AND triager answers question    Answer Assessment - Initial Assessment Questions  1. NAME of MEDICATION: \"What medicine are you calling about?\"      Flexeril, Tylenol, Ibuprofen  2. QUESTION: \"What is your question?\" (e.g., medication refill, side effect)      Should I take all of these meds>  3. PRESCRIBING HCP: \"Who prescribed it?\" Reason: if prescribed by specialist, call should be referred to that group.      Dr. Hairston  4. SYMPTOMS: \"Do you have any symptoms?\"      Neck strain, pain  5. SEVERITY: If symptoms are present, ask \"Are they mild, moderate or severe?\"     Moderate  6. PREGNANCY:  \"Is there any chance that you are pregnant?\" \"When was your last menstrual period?\"      no    Protocols used: MEDICATION QUESTION CALL-ADULT-      "

## 2023-04-10 ENCOUNTER — HOSPITAL ENCOUNTER (OUTPATIENT)
Dept: GENERAL RADIOLOGY | Facility: HOSPITAL | Age: 73
Discharge: HOME | End: 2023-04-10
Admitting: INTERNAL MEDICINE
Payer: MEDICARE

## 2023-04-10 ENCOUNTER — OFFICE VISIT (OUTPATIENT)
Dept: INTERNAL MEDICINE | Facility: CLINIC | Age: 73
End: 2023-04-10
Payer: MEDICARE

## 2023-04-10 VITALS
TEMPERATURE: 96.9 F | HEART RATE: 65 BPM | SYSTOLIC BLOOD PRESSURE: 118 MMHG | WEIGHT: 193 LBS | DIASTOLIC BLOOD PRESSURE: 70 MMHG | HEIGHT: 71 IN | BODY MASS INDEX: 27.02 KG/M2 | OXYGEN SATURATION: 98 %

## 2023-04-10 DIAGNOSIS — S46.912S SHOULDER STRAIN, LEFT, SEQUELA: ICD-10-CM

## 2023-04-10 DIAGNOSIS — I10 BENIGN HYPERTENSION: Chronic | ICD-10-CM

## 2023-04-10 DIAGNOSIS — S46.812S TRAPEZIUS MUSCLE STRAIN, LEFT, SEQUELA: ICD-10-CM

## 2023-04-10 DIAGNOSIS — M54.2 CERVICAL PAIN (NECK): ICD-10-CM

## 2023-04-10 DIAGNOSIS — E11.9 DIET-CONTROLLED DIABETES MELLITUS: Primary | ICD-10-CM

## 2023-04-10 PROBLEM — S86.911S: Status: RESOLVED | Noted: 2023-01-03 | Resolved: 2023-04-10

## 2023-04-10 PROBLEM — S46.011D ROTATOR CUFF STRAIN, RIGHT, SUBSEQUENT ENCOUNTER: Status: RESOLVED | Noted: 2020-04-23 | Resolved: 2023-04-10

## 2023-04-10 LAB — HBA1C MFR BLD: 6.2 %

## 2023-04-10 PROCEDURE — 72040 X-RAY EXAM NECK SPINE 2-3 VW: CPT

## 2023-04-10 NOTE — PROGRESS NOTES
"      Chief Complaint  Diabetes (A1C-6.2) and Neck Pain (Still hurting /Requesting xray)    Subjective        Geoff Edwards presents to [facility]    HPI  Patient presents for follow-up.  Patient still having neck pain.  Patient still having neck tenderness and discomfort.  Patient required emergency room visit after last visit, he thought it was made worse by the compression that was attempted in the office.  Recommended the patient do some stretching and we will get some plain films today.    Review of Systems   Constitutional: Negative for activity change and appetite change.   Respiratory: Negative for cough and shortness of breath.    Cardiovascular: Negative for chest pain and palpitations.   Gastrointestinal: Negative for abdominal distention, abdominal pain, diarrhea, nausea and vomiting.       Objective   Vital Signs:  /70 (BP Location: Left arm, Patient Position: Sitting, Cuff Size: Adult)   Pulse 65   Temp 96.9 °F (36.1 °C) (Temporal)   Ht 180.3 cm (71\")   Wt 87.5 kg (193 lb)   SpO2 98%   BMI 26.92 kg/m²   Estimated body mass index is 26.92 kg/m² as calculated from the following:    Height as of this encounter: 180.3 cm (71\").    Weight as of this encounter: 87.5 kg (193 lb).      Physical Exam  Vitals reviewed.   Constitutional:       Appearance: He is not ill-appearing.   HENT:      Head: Normocephalic and atraumatic.      Mouth/Throat:      Mouth: Mucous membranes are dry.      Pharynx: Oropharynx is clear.   Eyes:      General: No scleral icterus.     Conjunctiva/sclera: Conjunctivae normal.   Cardiovascular:      Rate and Rhythm: Normal rate and regular rhythm.   Pulmonary:      Effort: Pulmonary effort is normal. No respiratory distress.   Musculoskeletal:         General: Tenderness present.   Skin:     General: Skin is warm.      Coloration: Skin is not jaundiced.   Neurological:      Mental Status: He is alert.   Psychiatric:         Mood and " Affect: Mood normal.         Behavior: Behavior normal.                     Assessment and Plan   Diagnoses and all orders for this visit:    1. Diet-controlled diabetes mellitus (Primary)  -     POC Glycated Hemoglobin, Total  -     Microalbumin / Creatinine Urine Ratio - Urine, Clean Catch; Future    2. Trapezius muscle strain, left, sequela  -     XR Spine Cervical 2 or 3 View; Future    3. Cervical pain (neck)  -     XR Spine Cervical 2 or 3 View; Future    4. Shoulder strain, left, sequela    5. Benign hypertension      Patient's hemoglobin A1c is controlled at 6.2.  Patient had been as high as 6.9 previously, he has been doing quite well.  The patient is due for a microalbumin and creatinine ratio.  We will get this prior to his next visit which will be his annual exam.    Patient has neck pain and trapezius pain stable.  Patient unfortunately had worsening of the pain after last visit and required an emergency room visit.  Patient has been improving some.  Patient has been taking the Flexeril and ibuprofen intermittently.  Patient request x-rays.  I suggested to him that he likely has some degenerative disc disease, as it would have already been better by now if it was just a simple muscle strain.  Patient probably has some arthritis in his neck that is making it last a little bit longer.  Discussed appropriate lifting and avoiding rotational movements.    Recommended continue OTC antiinflammatory agents as well as flexeril.  Has a few flexeril left, if needs more I will send in refills.      BP well controlled at present, irbesartan and amlodipine and PRN clonidine.      Result Review :  The following data was reviewed by: Ochoa Hairston MD on 04/10/2023:    Data reviewed: Recent hospitalization notes Reviewed the emergency room department note.  Recommended conservative therapy including over-the-counter medications.           Ordered/reviewed 3+ tests  Addressed >2 chronic medical conditions.          Follow Up   Return in about 6 months (around 10/10/2023), or if symptoms worsen or fail to improve, for Recheck.  Patient was given instructions and counseling regarding his condition or for health maintenance advice. Please see specific information pulled into the AVS if appropriate.       CYNTHIA Hairston MD, FACP, [facility]      Electronically signed by Ochoa Hairston MD, 04/10/23, 12:42 PM CDT.

## 2023-05-03 ENCOUNTER — OFFICE VISIT (OUTPATIENT)
Dept: INTERNAL MEDICINE | Facility: CLINIC | Age: 73
End: 2023-05-03
Payer: MEDICARE

## 2023-05-03 ENCOUNTER — TELEPHONE (OUTPATIENT)
Dept: INTERNAL MEDICINE | Facility: CLINIC | Age: 73
End: 2023-05-03

## 2023-05-03 ENCOUNTER — HOSPITAL ENCOUNTER (OUTPATIENT)
Dept: GENERAL RADIOLOGY | Facility: HOSPITAL | Age: 73
Discharge: HOME | End: 2023-05-03
Admitting: INTERNAL MEDICINE
Payer: MEDICARE

## 2023-05-03 VITALS
TEMPERATURE: 97.5 F | BODY MASS INDEX: 26.54 KG/M2 | OXYGEN SATURATION: 97 % | WEIGHT: 189.6 LBS | DIASTOLIC BLOOD PRESSURE: 68 MMHG | HEIGHT: 71 IN | SYSTOLIC BLOOD PRESSURE: 124 MMHG | HEART RATE: 60 BPM

## 2023-05-03 DIAGNOSIS — M25.531 RIGHT WRIST PAIN: ICD-10-CM

## 2023-05-03 DIAGNOSIS — M79.89 SWELLING OF RIGHT HAND: ICD-10-CM

## 2023-05-03 DIAGNOSIS — M79.89 SWELLING OF RIGHT HAND: Primary | ICD-10-CM

## 2023-05-03 PROCEDURE — 73110 X-RAY EXAM OF WRIST: CPT

## 2023-05-03 PROCEDURE — 3074F SYST BP LT 130 MM HG: CPT | Performed by: INTERNAL MEDICINE

## 2023-05-03 PROCEDURE — 3078F DIAST BP <80 MM HG: CPT | Performed by: INTERNAL MEDICINE

## 2023-05-03 PROCEDURE — 3044F HG A1C LEVEL LT 7.0%: CPT | Performed by: INTERNAL MEDICINE

## 2023-05-03 PROCEDURE — 73130 X-RAY EXAM OF HAND: CPT

## 2023-05-03 PROCEDURE — 99213 OFFICE O/P EST LOW 20 MIN: CPT | Performed by: INTERNAL MEDICINE

## 2023-05-03 RX ORDER — TRAMADOL HYDROCHLORIDE 50 MG/1
50 TABLET ORAL 2 TIMES DAILY PRN
Qty: 30 TABLET | Refills: 0 | Status: SHIPPED | OUTPATIENT
Start: 2023-05-03

## 2023-05-03 NOTE — ADDENDUM NOTE
Addended by: PHLI HAYDEN on: 5/3/2023 10:27 AM     Modules accepted: Orders    
(1) body pink, extremities blue

## 2023-05-03 NOTE — PROGRESS NOTES
"      Chief Complaint  Edema (Patient complains of R hand swelling and constant pain x 3 days. Patient states he can barely move his hand or pick anything up. )    Subjective        Geoff Edwards presents to [facility]    HPI  Mr. Edwards presents for the above problems.  Patient has significant swelling from his wrist down.  There is no swelling in his forearm or shoulder.  Patient not had any injury, has not slept on that side abnormal.  Patient indicates that it throbs and hurts worse at night.  Patient does not recall any significant injury or issues with this wrist.    Review of Systems    Objective   Vital Signs:  /68 (BP Location: Left arm, Patient Position: Sitting, Cuff Size: Adult)   Pulse 60   Temp 97.5 °F (36.4 °C) (Temporal)   Ht 180.3 cm (71\")   Wt 86 kg (189 lb 9.6 oz)   SpO2 97%   BMI 26.44 kg/m²   Estimated body mass index is 26.44 kg/m² as calculated from the following:    Height as of this encounter: 180.3 cm (71\").    Weight as of this encounter: 86 kg (189 lb 9.6 oz).      Physical Exam  Vitals reviewed.   Constitutional:       Appearance: Normal appearance.   Musculoskeletal:      Right wrist: Swelling and tenderness present. No deformity, effusion, bony tenderness, snuff box tenderness or crepitus. Decreased range of motion. Normal pulse.      Left wrist: Normal. No swelling, deformity or effusion.      Right hand: Swelling and tenderness present. No deformity. Decreased range of motion.      Left hand: Normal. No swelling or deformity.   Neurological:      Mental Status: He is alert.                   Assessment and Plan   Diagnoses and all orders for this visit:    1. Swelling of right hand (Primary)  -     XR Wrist 3+ View Right; Future  -     XR Hand 3+ View Right; Future  -     traMADol (ULTRAM) 50 MG tablet; Take 1 tablet by mouth 2 (Two) Times a Day As Needed for Moderate Pain.  Dispense: 30 tablet; Refill: 0    2. Right wrist " pain  -     XR Wrist 3+ View Right; Future  -     XR Hand 3+ View Right; Future  -     traMADol (ULTRAM) 50 MG tablet; Take 1 tablet by mouth 2 (Two) Times a Day As Needed for Moderate Pain.  Dispense: 30 tablet; Refill: 0      We will get x-rays of the right wrist and hand.  If these are normal.  We will then proceed to getting a wrist cock/splint.  I will also send in some tramadol to help with his wrist and hand pain as well as his ongoing cervical neck pain.  The cervical neck pain has been stable.  I also educated the patient on elevation and ice.    Result Review :                      Follow Up   No follow-ups on file.  Patient was given instructions and counseling regarding his condition or for health maintenance advice. Please see specific information pulled into the AVS if appropriate.       CYNTHIA Hairston MD, FACP, [facility]      Electronically signed by Ochoa Hairston MD, 05/03/23, 9:42 AM CDT.

## 2023-05-03 NOTE — TELEPHONE ENCOUNTER
PATIENT REQUESTING PROVIDER RESEND THE ORDER FOR A SPLINT  FOR HIS RIGHT  HAND     TO     [facility]     PATIENT STATES HE IS IN THE PARKING LOT KNOW AND THEY TOLD HIM THEY HAVE NOT RECEIVED ANYTHING FOR HIM     GOOD CALL BACK   [phone]

## 2023-05-16 DIAGNOSIS — R97.20 ELEVATED PROSTATE SPECIFIC ANTIGEN (PSA): Primary | ICD-10-CM

## 2023-05-16 NOTE — PROGRESS NOTES
Subjective    Mr. Edwards is 72 y.o. male    Chief Complaint: Elevated PSA/BPH    History of Present Illness  Patient presents for annual follow-up with history of BPH and elevated PSA.  Patient underwent TURP 08/30/2021.  Pathology was consistent with chronic inflammation.  Prior to surgery his PSA was over 20.  Post recent PSA is 0.7.  Patient denies any urinary complaints has a good flow and stream.  He is not on any urologic medications.  Urine is clear.  Lab Results   Component Value Date    PSA 0.767 05/16/2023    PSA 0.646 05/17/2022    PSA 20.6 (H) 08/27/2021       The following portions of the patient's history were reviewed and updated as appropriate: allergies, current medications, past family history, past medical history, past social history, past surgical history and problem list.    Review of Systems   Constitutional:  Negative for chills and fever.   Gastrointestinal:  Negative for abdominal pain, anal bleeding and blood in stool.   Genitourinary:  Negative for dysuria and hematuria.       Current Outpatient Medications:     amLODIPine (NORVASC) 5 MG tablet, TAKE 1 TABLET BY MOUTH DAILY., Disp: 30 tablet, Rfl: 11    aspirin (aspirin) 81 MG EC tablet, Take 1 tablet by mouth Daily., Disp: 30 tablet, Rfl: 11    cloNIDine (Catapres) 0.1 MG tablet, Take 1 tablet by mouth 2 (Two) Times a Day As Needed for High Blood Pressure (> 150/90). (Patient taking differently: Take 1 tablet by mouth Daily.), Disp: 60 tablet, Rfl: 11    Contour Next Test test strip, USE ONE EVERY DAY, Disp: 100 each, Rfl: 3    cyclobenzaprine (FLEXERIL) 5 MG tablet, Take 1 tablet by mouth 3 (Three) Times a Day As Needed for Muscle Spasms., Disp: 45 tablet, Rfl: 0    ipratropium (ATROVENT) 0.06 % nasal spray, 2 sprays into the nostril(s) as directed by provider 4 (Four) Times a Day As Needed for Rhinitis. For drainage, Disp: 45 mL, Rfl: 3    irbesartan (AVAPRO) 300 MG tablet, TAKE 1 TABLET BY MOUTH DAILY, Disp: 90 tablet, Rfl: 1     Lancets Ultra Thin misc, 1 (ONE) MISC DAILY, Disp: 100 each, Rfl: 3    nitroglycerin (NITROSTAT) 0.4 MG SL tablet, 1 under the tongue as needed for angina, may repeat q5mins for up three doses, Disp: 20 tablet, Rfl: 1    rosuvastatin (CRESTOR) 5 MG tablet, TAKE 1 TABLET BY MOUTH DAILY., Disp: 90 tablet, Rfl: 3    traMADol (ULTRAM) 50 MG tablet, Take 1 tablet by mouth 2 (Two) Times a Day As Needed for Moderate Pain., Disp: 30 tablet, Rfl: 0    Past Medical History:   Diagnosis Date    Abnormal PSA     Chest pain     Diabetes mellitus     diet controlled    Elevated cholesterol     Hypertension     PONV (postoperative nausea and vomiting)     SEVERE    Seasonal allergies     Sleep apnea        Past Surgical History:   Procedure Laterality Date    APPENDECTOMY      CARDIAC CATHETERIZATION      CARDIAC CATHETERIZATION Left 8/5/2021    Procedure: Cardiac Catheterization/Vascular Study;  Surgeon: Luke Dixon MD;  Location: [facility] CATH INVASIVE LOCATION;  Service: Cardiology;  Laterality: Left;    COLONOSCOPY  11/16/2012    Normal. Repeat in 5 years. Dr. Tez Perdomo    COLONOSCOPY N/A 11/21/2017    Procedure: COLONOSCOPY WITH ANESTHESIA;  Surgeon: Tez Perdomo MD;  Location: [facility] ENDOSCOPY;  Service:     CYSTOSCOPY TRANSURETHRAL RESECTION OF PROSTATE N/A 8/30/2021    Procedure: CYSTOSCOPY TRANSURETHRAL RESECTION OF PROSTATE;  Surgeon: Ric Tobin MD;  Location: [facility] OR;  Service: Urology;  Laterality: N/A;    MANDIBLE SURGERY      PROSTATE BIOPSY      PROSTATE BIOPSY N/A 3/19/2020    Procedure: PROSTATE ULTRASOUND BIOPSY MRI FUSION WITH URONAV;  Surgeon: Ric Tobin MD;  Location: [facility] OR;  Service: Urology;  Laterality: N/A;       Social History     Socioeconomic History    Marital status:    Tobacco Use    Smoking status: Never    Smokeless tobacco: Never   Vaping Use    Vaping Use: Never used   Substance and Sexual Activity    Alcohol use: No    Drug use: No    Sexual activity:  "Defer       Family History   Problem Relation Age of Onset    Heart disease Father     No Known Problems Mother     No Known Problems Brother     No Known Problems Brother     No Known Problems Sister     No Known Problems Sister     No Known Problems Sister     No Known Problems Sister     Stomach cancer Sister     Cancer Sister 69    Colon cancer Neg Hx     Colon polyps Neg Hx        Objective    Temp 98.1 °F (36.7 °C)   Ht 180.3 cm (71\")   Wt 84.6 kg (186 lb 9.6 oz)   BMI 26.03 kg/m²     Physical Exam  Vitals reviewed.   Constitutional:       Appearance: Normal appearance.   HENT:      Head: Normocephalic and atraumatic.   Pulmonary:      Effort: Pulmonary effort is normal.   Genitourinary:     Comments: Digital rectal exam revealed a smooth symmetric prostate no suspicious lesions nodules asymmetry or firmness were palpated.  Neurological:      Mental Status: He is alert and oriented to person, place, and time.   Psychiatric:         Mood and Affect: Mood normal.         Behavior: Behavior normal.           Results for orders placed or performed in visit on 05/23/23   POC Urinalysis Dipstick, Multipro    Specimen: Urine   Result Value Ref Range    Color Yellow Yellow, Straw, Dark Yellow, Clarissa    Clarity, UA Clear Clear    Glucose, UA Negative Negative mg/dL    Bilirubin Negative Negative    Ketones, UA Negative Negative    Specific Gravity  1.025 1.005 - 1.030    Blood, UA Trace (A) Negative    pH, Urine 5.5 5.0 - 8.0    Protein, POC Trace (A) Negative mg/dL    Urobilinogen, UA 1 E.U./dL (A) Normal, 0.2 E.U./dL    Nitrite, UA Negative Negative    Leukocytes Negative Negative     IPSS Questionnaire (AUA-7):  Incomplete emptying  Over the past month, how often have you had a sensation of not emptying your bladder completely after you finish?: Not at all (05/23/23 0827)  Frequency  Over the past month, how often have you had to urinate again less than two hours after you finishing urinating ?: Less than 1 time " in 5 (05/23/23 0827)  Intermittency  Over the past month, how often have you found you stopped and started again several time when you urinated ?: Not at all (05/23/23 0827)  Urgency  Over the last month, how difficult  have you found it to postpone urination ?: Not at all (05/23/23 0827)  Weak Stream  Over the past month, how often have you had a weak urinary stream ?: Not at all (05/23/23 0827)  Straining  Over the past month, how often have you had to push or strain to begin urination ?: Not at all (05/23/23 0827)  Nocturia  Over the past month, how many times did you most typically get up to urinate from the time you went to bed until the time you got up in the morning ?: Less than 1 time in 5 (05/23/23 0827)  Quality of life due to urinary symptoms  If you were to spend the rest of your life with your urinary condition the way it is now, how would feel about that?: Pleased (05/23/23 0827)    Scores  Total IPSS Score: (!) 2 (05/23/23 0827)  Total Score = Symtomatic Level: Mildly symptomatic: 0-7 (05/23/23 0827)       Assessment and Plan    Diagnoses and all orders for this visit:    1. Elevated prostate specific antigen (PSA) (Primary)  -     POC Urinalysis Dipstick, Multipro  -     PSA DIAGNOSTIC; Future    2. BPH with urinary obstruction    Patient here for annual follow-up patient has no voiding complaints since his TURP.  PSA remains low at 0.7.  Prior to the TURP his PSA was 20.6.  Pathology revealed chronic inflammation.  His urine is clear he will follow-up 1 year PSA prior.

## 2023-05-17 LAB — PSA SERPL-MCNC: 0.77 NG/ML (ref 0–4)

## 2023-05-23 ENCOUNTER — OFFICE VISIT (OUTPATIENT)
Dept: UROLOGY | Facility: CLINIC | Age: 73
End: 2023-05-23
Payer: MEDICARE

## 2023-05-23 VITALS — HEIGHT: 71 IN | TEMPERATURE: 98.1 F | BODY MASS INDEX: 26.12 KG/M2 | WEIGHT: 186.6 LBS

## 2023-05-23 DIAGNOSIS — R97.20 ELEVATED PROSTATE SPECIFIC ANTIGEN (PSA): Primary | ICD-10-CM

## 2023-05-23 DIAGNOSIS — N40.1 BPH WITH URINARY OBSTRUCTION: ICD-10-CM

## 2023-05-23 DIAGNOSIS — N13.8 BPH WITH URINARY OBSTRUCTION: ICD-10-CM

## 2023-05-23 LAB
BILIRUB BLD-MCNC: NEGATIVE MG/DL
CLARITY, POC: CLEAR
COLOR UR: YELLOW
GLUCOSE UR STRIP-MCNC: NEGATIVE MG/DL
KETONES UR QL: NEGATIVE
LEUKOCYTE EST, POC: NEGATIVE
NITRITE UR-MCNC: NEGATIVE MG/ML
PH UR: 5.5 [PH] (ref 5–8)
PROT UR STRIP-MCNC: ABNORMAL MG/DL
RBC # UR STRIP: ABNORMAL /UL
SP GR UR: 1.02 (ref 1–1.03)
UROBILINOGEN UR QL: ABNORMAL

## 2023-05-23 PROCEDURE — 81001 URINALYSIS AUTO W/SCOPE: CPT | Performed by: PHYSICIAN ASSISTANT

## 2023-05-23 PROCEDURE — 1160F RVW MEDS BY RX/DR IN RCRD: CPT | Performed by: PHYSICIAN ASSISTANT

## 2023-05-23 PROCEDURE — 1159F MED LIST DOCD IN RCRD: CPT | Performed by: PHYSICIAN ASSISTANT

## 2023-05-23 PROCEDURE — 99213 OFFICE O/P EST LOW 20 MIN: CPT | Performed by: PHYSICIAN ASSISTANT

## 2023-07-13 ENCOUNTER — TELEPHONE (OUTPATIENT)
Dept: CARDIOLOGY | Facility: CLINIC | Age: 73
End: 2023-07-13

## 2023-07-13 NOTE — TELEPHONE ENCOUNTER
Caller: Geoff Edwards    Relationship to patient: Self    Best call back number: [phone]    Chief complaint: ON VACATION FOR 07.20.23 APPT. RESCHEDULED APPT OUTSIDE OF TIMEFRAME    Type of visit: FOLLOW UP    Requested date: EARLY MORNING      If rescheduling, when is the original appointment: 08.31.23     Additional notes:HUB SCHEDULED NEXT AVAILABLE AND ADDED TO WAITLIST. IF SOONER MORNING APPT BECOMES AVAILABLE PLEASE CONTACT PATIENT.

## 2023-07-25 ENCOUNTER — HOSPITAL ENCOUNTER (OUTPATIENT)
Dept: GENERAL RADIOLOGY | Facility: HOSPITAL | Age: 73
Discharge: HOME | End: 2023-07-25
Payer: MEDICARE

## 2023-07-25 ENCOUNTER — OFFICE VISIT (OUTPATIENT)
Dept: INTERNAL MEDICINE | Facility: CLINIC | Age: 73
End: 2023-07-25
Payer: MEDICARE

## 2023-07-25 VITALS
OXYGEN SATURATION: 97 % | BODY MASS INDEX: 26.18 KG/M2 | SYSTOLIC BLOOD PRESSURE: 128 MMHG | WEIGHT: 187 LBS | TEMPERATURE: 98.2 F | HEIGHT: 71 IN | HEART RATE: 76 BPM | DIASTOLIC BLOOD PRESSURE: 80 MMHG

## 2023-07-25 DIAGNOSIS — M25.572 ACUTE LEFT ANKLE PAIN: Primary | ICD-10-CM

## 2023-07-25 DIAGNOSIS — M25.472 SWELLING OF LEFT ANKLE JOINT: ICD-10-CM

## 2023-07-25 DIAGNOSIS — I10 BENIGN HYPERTENSION: ICD-10-CM

## 2023-07-25 DIAGNOSIS — M25.572 ACUTE LEFT ANKLE PAIN: ICD-10-CM

## 2023-07-25 PROCEDURE — 1160F RVW MEDS BY RX/DR IN RCRD: CPT

## 2023-07-25 PROCEDURE — 1159F MED LIST DOCD IN RCRD: CPT

## 2023-07-25 PROCEDURE — 3079F DIAST BP 80-89 MM HG: CPT

## 2023-07-25 PROCEDURE — 73610 X-RAY EXAM OF ANKLE: CPT

## 2023-07-25 PROCEDURE — 3074F SYST BP LT 130 MM HG: CPT

## 2023-07-25 PROCEDURE — 99213 OFFICE O/P EST LOW 20 MIN: CPT

## 2023-07-25 PROCEDURE — 96372 THER/PROPH/DIAG INJ SC/IM: CPT

## 2023-07-25 PROCEDURE — 3044F HG A1C LEVEL LT 7.0%: CPT

## 2023-07-25 RX ORDER — AMLODIPINE BESYLATE 5 MG/1
5 TABLET ORAL DAILY
Qty: 90 TABLET | Refills: 3 | Status: SHIPPED | OUTPATIENT
Start: 2023-07-25

## 2023-07-25 RX ORDER — TRIAMCINOLONE ACETONIDE 40 MG/ML
40 INJECTION, SUSPENSION INTRA-ARTICULAR; INTRAMUSCULAR ONCE
Status: COMPLETED | OUTPATIENT
Start: 2023-07-25 | End: 2023-07-25

## 2023-07-25 RX ADMIN — TRIAMCINOLONE ACETONIDE 40 MG: 40 INJECTION, SUSPENSION INTRA-ARTICULAR; INTRAMUSCULAR at 08:48

## 2023-07-25 NOTE — PROGRESS NOTES
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  Proceed with plan of care as we discussed in the office, if pain does not improve over the next 2 weeks please reach out let me know.

## 2023-07-25 NOTE — PROGRESS NOTES
Subjective   Geoff Edwards is a 73 y.o. male.   Chief Complaint   Patient presents with    Foot Pain     Pt has been experiencing left foot pain around his ankle. States it worsens when standing.   Pt describes pain as a sharp sensation.  Pain started 07/22 while pt was washing his car.       History of Present Illness   Mr. Edwards presents to the office today with complaints of left foot pain and swelling.  He states there has not been any trauma or injury that he can recall.  He states pain has been present since Saturday night, he states he he came home Saturday night from spending a week in [place], he states he did walk a lot more than he usually does while in the city.  He denies any falls.  He states that the left ankle is swollen, pain is sharp and occurs typically with movement as well as the pressure of his weight on it.  He states he has used a cane some to relieve some of the pressure while ambulating.  He reports while he walked extensively he always wore good supportive tennis shoes.  He states he has taken Tylenol and arthritis pain medication over-the-counter, there has been mild therapeutic relief, has also tried topical Voltaren gel and Biofreeze.  He does mention that the pain seems to be worse if he is been sitting for a while and then gets back up like it stiff, gets a little bit better with movement.    The following portions of the patient's history were reviewed and updated as appropriate: allergies, current medications, past family history, past medical history, past social history, past surgical history and problem list.    Review of Systems    Objective   Past Medical History:   Diagnosis Date    Abnormal PSA     Chest pain     Diabetes mellitus     diet controlled    Elevated cholesterol     Hypertension     PONV (postoperative nausea and vomiting)     SEVERE    Seasonal allergies     Sleep apnea       Past Surgical History:   Procedure Laterality Date    APPENDECTOMY       CARDIAC CATHETERIZATION      CARDIAC CATHETERIZATION Left 8/5/2021    Procedure: Cardiac Catheterization/Vascular Study;  Surgeon: Luke Dixon MD;  Location:  [facility] CATH INVASIVE LOCATION;  Service: Cardiology;  Laterality: Left;    COLONOSCOPY  11/16/2012    Normal. Repeat in 5 years. Dr. Tez Perdomo    COLONOSCOPY N/A 11/21/2017    Procedure: COLONOSCOPY WITH ANESTHESIA;  Surgeon: Tez Perdomo MD;  Location:  [facility] ENDOSCOPY;  Service:     CYSTOSCOPY TRANSURETHRAL RESECTION OF PROSTATE N/A 8/30/2021    Procedure: CYSTOSCOPY TRANSURETHRAL RESECTION OF PROSTATE;  Surgeon: Ric Tobin MD;  Location:  [facility] OR;  Service: Urology;  Laterality: N/A;    MANDIBLE SURGERY      PROSTATE BIOPSY      PROSTATE BIOPSY N/A 3/19/2020    Procedure: PROSTATE ULTRASOUND BIOPSY MRI FUSION WITH URONAV;  Surgeon: Ric Tobin MD;  Location:  [facility] OR;  Service: Urology;  Laterality: N/A;        Current Outpatient Medications:     amLODIPine (NORVASC) 5 MG tablet, Take 1 tablet by mouth Daily., Disp: 90 tablet, Rfl: 3    aspirin (aspirin) 81 MG EC tablet, Take 1 tablet by mouth Daily., Disp: 30 tablet, Rfl: 11    cloNIDine (Catapres) 0.1 MG tablet, Take 1 tablet by mouth 2 (Two) Times a Day As Needed for High Blood Pressure (> 150/90). (Patient taking differently: Take 1 tablet by mouth Daily.), Disp: 60 tablet, Rfl: 11    Contour Next Test test strip, USE ONE EVERY DAY, Disp: 100 each, Rfl: 3    irbesartan (AVAPRO) 300 MG tablet, TAKE 1 TABLET BY MOUTH DAILY, Disp: 90 tablet, Rfl: 1    Lancets Ultra Thin misc, 1 (ONE) MISC DAILY, Disp: 100 each, Rfl: 3    rosuvastatin (CRESTOR) 5 MG tablet, TAKE 1 TABLET BY MOUTH DAILY., Disp: 90 tablet, Rfl: 3    diclofenac (VOLTAREN) 50 MG EC tablet, Take 1 tablet by mouth 2 (Two) Times a Day As Needed (foot pain)., Disp: 20 tablet, Rfl: 0  No current facility-administered medications for this visit.      /80 (BP Location: Left arm, Patient Position: Sitting, Cuff  "Size: Adult)   Pulse 76   Temp 98.2 °F (36.8 °C) (Infrared)   Ht 180.3 cm (71\")   Wt 84.8 kg (187 lb)   SpO2 97%   BMI 26.08 kg/m²      Body mass index is 26.08 kg/m².          Physical Exam  Vitals and nursing note reviewed.   Constitutional:       General: He is not in acute distress.     Appearance: Normal appearance. He is not ill-appearing, toxic-appearing or diaphoretic.   HENT:      Head: Normocephalic and atraumatic.   Eyes:      Extraocular Movements: Extraocular movements intact.      Conjunctiva/sclera: Conjunctivae normal.      Pupils: Pupils are equal, round, and reactive to light.   Cardiovascular:      Rate and Rhythm: Normal rate and regular rhythm.      Pulses: Normal pulses.           Dorsalis pedis pulses are 2+ on the left side.        Posterior tibial pulses are 2+ on the left side.      Heart sounds: Normal heart sounds.   Pulmonary:      Effort: Pulmonary effort is normal.      Breath sounds: Normal breath sounds.   Musculoskeletal:         General: Tenderness present.      Cervical back: Normal range of motion and neck supple.      Left lower leg: Tenderness and bony tenderness present. Edema present.      Left foot: Decreased range of motion. Normal capillary refill. Swelling, tenderness and bony tenderness present. No deformity, bunion, Charcot foot, foot drop, prominent metatarsal heads, laceration or crepitus. Normal pulse.      Comments: Pain to the dorsal aspect of the left foot, more central, pain reproduced with plantar flexion, dorsiflexion and rotating medially and laterally. No instability noted, no erythema, bruising.    Feet:      Left foot:      Skin integrity: Skin integrity normal.      Toenail Condition: Left toenails are normal.   Skin:     General: Skin is warm and dry.      Capillary Refill: Capillary refill takes less than 2 seconds.   Neurological:      General: No focal deficit present.      Mental Status: He is alert and oriented to person, place, and time. " Mental status is at baseline.      Motor: No weakness.   Psychiatric:         Mood and Affect: Mood normal.         Behavior: Behavior normal.         Thought Content: Thought content normal.         Judgment: Judgment normal.             Assessment & Plan   Diagnoses and all orders for this visit:    1. Acute left ankle pain (Primary)  -     diclofenac (VOLTAREN) 50 MG EC tablet; Take 1 tablet by mouth 2 (Two) Times a Day As Needed (foot pain).  Dispense: 20 tablet; Refill: 0  -     triamcinolone acetonide (KENALOG-40) injection 40 mg  -     XR Ankle 3+ View Left; Future    2. Swelling of left ankle joint  -     diclofenac (VOLTAREN) 50 MG EC tablet; Take 1 tablet by mouth 2 (Two) Times a Day As Needed (foot pain).  Dispense: 20 tablet; Refill: 0  -     triamcinolone acetonide (KENALOG-40) injection 40 mg  -     XR Ankle 3+ View Left; Future    3. Benign hypertension  -     amLODIPine (NORVASC) 5 MG tablet; Take 1 tablet by mouth Daily.  Dispense: 90 tablet; Refill: 3               Plan of care reviewed with Mr. Edwards  Suspect tendinopathy, we will proceed with imaging just in case, advised to purchase an ACE ankle brace and to wear while up and ambulating for the next 2 weeks, proceeded with Kenalog injection, diclofenac short-term, is to take this with food, avoid overuse, may continue use Tylenol, ice, heat, topical therapies such as Voltaren gel and Biofreeze.  Recommended keeping the left lower extremity elevated when sedentary, especially at nighttime and to apply ice pack.  If symptoms do not show improvement over the next 2 weeks or if they increase in severity prior to this he is to reach out let me know.  He is also needing refill of amlodipine today, blood pressure is 128/80, will continue with current therapy.      Please note that portions of this note were completed with a voice recognition program.     Electronically signed by MARIA DOLORES Martinez, 07/25/23, 12:26 CDT.

## 2023-07-28 RX ORDER — IRBESARTAN 300 MG/1
TABLET ORAL
Qty: 90 TABLET | Refills: 1 | Status: SHIPPED | OUTPATIENT
Start: 2023-07-28

## 2023-08-12 ENCOUNTER — NURSE TRIAGE (OUTPATIENT)
Dept: CALL CENTER | Facility: HOSPITAL | Age: 73
End: 2023-08-12
Payer: MEDICARE

## 2023-08-12 NOTE — TELEPHONE ENCOUNTER
Week ago pain had left foot, was given meds for foot pain, since then, pain goes up left to hip, script, was given Diclofenac 50 mg  for foot pain,would this help with this hip pain?Told him could help with pain, to follow up with PCP on Monday

## 2023-08-12 NOTE — TELEPHONE ENCOUNTER
Reason for Disposition   [1] Caller has NON-URGENT medicine question about med that PCP prescribed AND [2] triager unable to answer question    Additional Information   Negative: [1] Intentional drug overdose AND [2] suicidal thoughts or ideas   Negative: Drug overdose and triager unable to answer question   Negative: Caller requesting a renewal or refill of a medicine patient is currently taking   Negative: Caller requesting information unrelated to medicine   Negative: Caller requesting information about COVID-19 Vaccine   Negative: Caller requesting information about Emergency Contraception   Negative: Caller requesting information about Combined Birth Control Pills   Negative: Caller requesting information about Progestin Birth Control Pills   Negative: Caller requesting information about Post-Op pain or medicines   Negative: Caller requesting a prescription antibiotic (such as Penicillin) for Strep throat and has a positive culture result   Negative: Caller requesting a prescription anti-viral med (such as Tamiflu) and has influenza (flu) symptoms   Negative: Immunization reaction suspected   Negative: Rash while taking a medicine or within 3 days of stopping it   Negative: [1] Asthma and [2] having symptoms of asthma (cough, wheezing, etc.)   Negative: [1] Symptom of illness (e.g., headache, abdominal pain, earache, vomiting) AND [2] more than mild   Negative: Breastfeeding questions about mother's medicines and diet   Negative: MORE THAN A DOUBLE DOSE of a prescription or over-the-counter (OTC) drug   Negative: [1] DOUBLE DOSE (an extra dose or lesser amount) of prescription drug AND [2] any symptoms (e.g., dizziness, nausea, pain, sleepiness)   Negative: [1] DOUBLE DOSE (an extra dose or lesser amount) of over-the-counter (OTC) drug AND [2] any symptoms (e.g., dizziness, nausea, pain, sleepiness)   Negative: Took another person's prescription drug   Negative: [1] DOUBLE DOSE (an extra dose or lesser amount)  "of prescription drug AND [2] NO symptoms  (Exception: A double dose of antibiotics.)   Negative: Diabetes drug error or overdose (e.g., took wrong type of insulin or took extra dose)   Negative: [1] Prescription not at pharmacy AND [2] was prescribed by PCP recently (Exception: Triager has access to EMR and prescription is recorded there. Go to Home Care and confirm for pharmacy.)   Negative: [1] Pharmacy calling with prescription question AND [2] triager unable to answer question   Negative: [1] Caller has URGENT medicine question about med that PCP or specialist prescribed AND [2] triager unable to answer question   Negative: Medicine patch causing local rash or itching   Negative: [1] Caller has medicine question about med NOT prescribed by PCP AND [2] triager unable to answer question (e.g., compatibility with other med, storage)   Negative: Prescription request for new medicine (not a refill)   Negative: Caller wants to use a complementary or alternative medicine   Negative: [1] Prescription prescribed recently is not at pharmacy AND [2] triager has access to patient's EMR AND [3] prescription is recorded in the EMR    Answer Assessment - Initial Assessment Questions  1. NAME of MEDICINE: \"What medicine(s) are you calling about?\"      Diclofenac  2. QUESTION: \"What is your question?\" (e.g., double dose of medicine, side effect)      Would it help with my hip pain  3. PRESCRIBER: \"Who prescribed the medicine?\" Reason: if prescribed by specialist, call should be referred to that group.      Dr. Hairston  4. SYMPTOMS: \"Do you have any symptoms?\" If Yes, ask: \"What symptoms are you having?\"  \"How bad are the symptoms (e.g., mild, moderate, severe)      Pain in foot and hip was prescribed for foot pain  5. PREGNANCY:  \"Is there any chance that you are pregnant?\" \"When was your last menstrual period?\"      no    Protocols used: Medication Question Call-ADULT-    "

## 2023-08-14 NOTE — TELEPHONE ENCOUNTER
Just spoke with Mr. Edwards who states his pain is better today. He will call for appointment if it gets worse again.

## 2023-08-14 NOTE — TELEPHONE ENCOUNTER
Just now seeing this, if he is still having issues with the pain to the hip please have him schedule an appointment. It is okay for him to try the diclofenac if he has not yet.

## 2023-08-15 RX ORDER — ROSUVASTATIN CALCIUM 5 MG/1
5 TABLET, COATED ORAL DAILY
Qty: 90 TABLET | Refills: 3 | Status: SHIPPED | OUTPATIENT
Start: 2023-08-15

## 2023-08-31 ENCOUNTER — OFFICE VISIT (OUTPATIENT)
Dept: CARDIOLOGY | Facility: CLINIC | Age: 73
End: 2023-08-31
Payer: MEDICARE

## 2023-08-31 VITALS
HEIGHT: 71 IN | HEART RATE: 51 BPM | BODY MASS INDEX: 25.9 KG/M2 | SYSTOLIC BLOOD PRESSURE: 140 MMHG | DIASTOLIC BLOOD PRESSURE: 72 MMHG | WEIGHT: 185 LBS

## 2023-08-31 DIAGNOSIS — R00.1 BRADYCARDIA, SINUS: ICD-10-CM

## 2023-08-31 DIAGNOSIS — I47.1 PAROXYSMAL SVT (SUPRAVENTRICULAR TACHYCARDIA): ICD-10-CM

## 2023-08-31 DIAGNOSIS — I10 PRIMARY HYPERTENSION: ICD-10-CM

## 2023-08-31 DIAGNOSIS — I25.10 NON-OCCLUSIVE CORONARY ARTERY DISEASE: Primary | ICD-10-CM

## 2023-08-31 DIAGNOSIS — E78.5 HYPERLIPIDEMIA LDL GOAL <70: ICD-10-CM

## 2023-08-31 DIAGNOSIS — G47.33 OBSTRUCTIVE SLEEP APNEA: ICD-10-CM

## 2023-08-31 DIAGNOSIS — E11.9 DIET-CONTROLLED DIABETES MELLITUS: ICD-10-CM

## 2023-08-31 NOTE — PROGRESS NOTES
Subjective:     Encounter Date: 08/31/2023      Patient ID: Geoff Edwards is a 73 y.o. male with non obstructive coronary artery disease, hypertension, and obstructive sleep apnea    Chief Complaint: 3 month follow up  Hypertension  This is a chronic problem. The current episode started more than 1 year ago. The problem is controlled. Pertinent negatives include no anxiety, chest pain, headaches, malaise/fatigue, orthopnea, palpitations, peripheral edema, PND or shortness of breath. Risk factors for coronary artery disease include dyslipidemia, diabetes mellitus and male gender. Current antihypertension treatment includes calcium channel blockers and angiotensin blockers. Hypertensive end-organ damage includes CAD/MI. Identifiable causes of hypertension include sleep apnea.   Hyperlipidemia  This is a chronic problem. The current episode started more than 1 year ago. The problem is controlled. Exacerbating diseases include diabetes. Pertinent negatives include no chest pain or shortness of breath. Current antihyperlipidemic treatment includes statins. Risk factors for coronary artery disease include dyslipidemia, hypertension and male sex.     Patient presents today for management of non obstructive coronary artery disease. He reports that he has been doing well. He denies any chest pain. He denies any dyspnea on exertion. He reports that he has been using his CPAP nightly. He denies any fatigue, decreased stamina or dyspnea on exertion. He denies any leg swelling, orthopnea or PND. He denies any palpitations or heart racing. Patient denies any dizziness and near syncope. Patient follows with Dr Hairston as PCP.     The following portions of the patient's history were reviewed and updated as appropriate: allergies, current medications, past family history, past medical history, past social history, past surgical history and problem list.    Allergies   Allergen Reactions    Ace Inhibitors Cough        Current Outpatient Medications:     amLODIPine (NORVASC) 5 MG tablet, Take 1 tablet by mouth Daily., Disp: 90 tablet, Rfl: 3    aspirin (aspirin) 81 MG EC tablet, Take 1 tablet by mouth Daily., Disp: 30 tablet, Rfl: 11    cloNIDine (Catapres) 0.1 MG tablet, Take 1 tablet by mouth 2 (Two) Times a Day As Needed for High Blood Pressure (> 150/90). (Patient taking differently: Take 1 tablet by mouth Daily.), Disp: 60 tablet, Rfl: 11    Contour Next Test test strip, USE ONE EVERY DAY, Disp: 100 each, Rfl: 3    diclofenac (VOLTAREN) 50 MG EC tablet, Take 1 tablet by mouth 2 (Two) Times a Day As Needed (foot pain)., Disp: 20 tablet, Rfl: 0    irbesartan (AVAPRO) 300 MG tablet, TAKE 1 TABLET BY MOUTH DAILY, Disp: 90 tablet, Rfl: 1    Lancets Ultra Thin misc, 1 (ONE) MISC DAILY, Disp: 100 each, Rfl: 3    rosuvastatin (CRESTOR) 5 MG tablet, TAKE 1 TABLET BY MOUTH DAILY., Disp: 90 tablet, Rfl: 3      Past Medical History:   Diagnosis Date    Abnormal PSA     Chest pain     Diabetes mellitus     diet controlled    Elevated cholesterol     Hypertension     PONV (postoperative nausea and vomiting)     SEVERE    Seasonal allergies     Sleep apnea      Social History     Socioeconomic History    Marital status:    Tobacco Use    Smoking status: Never    Smokeless tobacco: Never   Vaping Use    Vaping Use: Never used   Substance and Sexual Activity    Alcohol use: No    Drug use: No    Sexual activity: Defer       Review of Systems   Constitutional: Negative for malaise/fatigue.   HENT:  Negative for hoarse voice.    Cardiovascular:  Negative for chest pain, dyspnea on exertion, irregular heartbeat, leg swelling, near-syncope, orthopnea, palpitations and paroxysmal nocturnal dyspnea.   Respiratory:  Negative for shortness of breath.    Hematologic/Lymphatic: Does not bruise/bleed easily.   Genitourinary:  Negative for hematuria.   Neurological:  Negative for dizziness and headaches.   All other systems reviewed and are  "negative.       Objective:     Vitals reviewed.   Constitutional:       General: Not in acute distress.     Appearance: Normal appearance. Well-developed.   Eyes:      Pupils: Pupils are equal, round, and reactive to light.   HENT:      Head: Normocephalic and atraumatic.      Nose: Nose normal.   Neck:      Vascular: No carotid bruit.   Pulmonary:      Effort: Pulmonary effort is normal. No respiratory distress.      Breath sounds: Normal breath sounds. No wheezing. No rales.   Cardiovascular:      Normal rate. Regular rhythm.      Murmurs: There is no murmur.   Edema:     Peripheral edema absent.   Abdominal:      General: There is no distension.      Palpations: Abdomen is soft.   Musculoskeletal: Normal range of motion.      Cervical back: Normal range of motion and neck supple. Skin:     General: Skin is warm.      Findings: No erythema or rash.   Neurological:      General: No focal deficit present.      Mental Status: Alert and oriented to person, place, and time.   Psychiatric:         Attention and Perception: Attention normal.         Mood and Affect: Mood normal.         Speech: Speech normal.         Behavior: Behavior normal.         Thought Content: Thought content normal.         Judgment: Judgment normal.       /72   Pulse 51   Ht 180.3 cm (71\")   Wt 83.9 kg (185 lb)   BMI 25.80 kg/mý       ECG 12 Lead    Date/Time: 8/31/2023 11:17 AM  Performed by: Erick Singer APRN  Authorized by: Erick Singer APRN   Comparison: compared with previous ECG from 9/21/2022  Similar to previous ECG  Rhythm: sinus bradycardia  Rate: bradycardic  BPM: 48        Lab Review:       Lab Results   Component Value Date    CHLPL 138 12/29/2022    TRIG 54 12/29/2022    HDL 60 12/29/2022    LDL 66 12/29/2022     Lab Results   Component Value Date    HGBA1C 6.2 04/10/2023     Results for orders placed during the hospital encounter of 03/02/22    Adult Transthoracic Echo Complete w/ Color, Spectral and Contrast if " necessary per protocol    Interpretation Summary  ú Left ventricular wall thickness is consistent with mild concentric hypertrophy.  ú Left ventricular ejection fraction appears to be 61 - 65%. Left ventricular systolic function is normal.  ú Abnormal global longitudinal LV strain (GLS) = -15.0%  ú Left ventricular diastolic function was normal.  ú Estimated right ventricular systolic pressure from tricuspid regurgitation is normal (<35 mmHg).    Conclusion of cardiac catheterization    8/5/2021        Normal left main coronary artery  Mid left anterior descending coronary artery has 30 to 40% stenosis with moderate atherosclerotic plaque with normal FFR of 0.86  FFR performed as patient has exertional chest pain and had abnormal stress echo suggestive of left anterior descending coronary artery distribution ischemia  Normal left circumflex coronary artery  Normal right coronary artery  Moderate tortuosity of epicardial vessels due to uncontrolled hypertension  Mildly elevated left ventricular end-diastolic pressure of 14 mmHg.     ____________________________________________________________________________________________________________________________________________     Plan after cardiac catheterization     Medical management  Keep LDL well below 70 mg/dL  Start on Crestor 5 mg p.o. daily  Start amlodipine 5 mg p.o. daily  Sublingual nitroglycerin as needed for chest pain for possible small vessel disease  Aspirin 81 mg p.o. daily  Monitor blood pressures twice a day at home and bring recordings for office visit  Clonidine 0.1 mg p.o. twice daily as needed for blood pressure more than 150/90 mmHg     Intensive risk factor modifications for both primary and secondary prevention if applicable  Hydration   Observation      I have personally reviewed echo, LHC, labs and past office notes prior to patients visit  Assessment:          Diagnosis Plan   1. Non-occlusive coronary artery disease  ECG 12 Lead      2.  Primary hypertension        3. Diet-controlled diabetes mellitus        4. Hyperlipidemia LDL goal <70        5. Paroxysmal SVT (supraventricular tachycardia)        6. Bradycardia, sinus        7. Obstructive sleep apnea                 Plan:       1. Coronary artery disease: non-obstructive disease: C 8/2021 showed 30-40% stenosis with moderate atherosclerotic plaque with normal FFR of 0.86. patient remains chest pain free. Continue aspirin, norvasc, rosuvastatin, and irbesartan.     2. Hypertension: Elevated in office initially; patient was a little aggravated about wait in office today. Recheck improved. Continue current medications.     3. Controlled Type 2 DM: A1C 6.2 4/2023. Managed and followed by PCP    4. Hyperlipidemia: Lipid panel from 12/29/2022 demonstrates well controlled cholesterol. Continue rosuvastatin. Managed and followed by PCP    5. Paroxysmal SVT: 31 runs noted on holter monitor 7/2022. Patient denies any symptoms at this time.     6. Bradycardia: EKG today shows heart rate 48. Patient is not on any AV amaury blocking medications. Recommend patient to monitor. If he develops any symptoms concerning for bradycardia then notify PCP or our office for re-evaluation with holter monitor.    7. Obstructive sleep apnea: Received CPAP about a week ago. He is trying to adjust to machine and has been able to use it for 4-5 hours. Discussed with patient that he may be a candidate for Inspire procedure with Dr Pereira    I have reviewed and confirmed the accuracy of the ROS as documented by the   MA/LPN/RN MARIA DOLORES Lujan    I spent 36 minutes caring for Geoff on this date of service. This time includes time spent by me in the following activities:preparing for the visit, reviewing tests, obtaining and/or reviewing a separately obtained history, performing a medically appropriate examination and/or evaluation , counseling and educating the patient/family/caregiver, and documenting information in the  medical record    I spent 2 minutes on the separately reported service of EKG. This time is not included in the time used to support the E/M service also reported today.    Patient is to follow up in 6 months or sooner if needed

## 2023-10-11 ENCOUNTER — OFFICE VISIT (OUTPATIENT)
Dept: INTERNAL MEDICINE | Facility: CLINIC | Age: 73
End: 2023-10-11
Payer: MEDICARE

## 2023-10-11 VITALS
WEIGHT: 183 LBS | TEMPERATURE: 97.6 F | HEART RATE: 51 BPM | BODY MASS INDEX: 25.62 KG/M2 | SYSTOLIC BLOOD PRESSURE: 120 MMHG | DIASTOLIC BLOOD PRESSURE: 64 MMHG | OXYGEN SATURATION: 99 % | HEIGHT: 71 IN

## 2023-10-11 DIAGNOSIS — I10 BENIGN HYPERTENSION: Primary | Chronic | ICD-10-CM

## 2023-10-11 DIAGNOSIS — N18.2 CHRONIC KIDNEY DISEASE, STAGE 2 (MILD): ICD-10-CM

## 2023-10-11 DIAGNOSIS — E11.9 DIET-CONTROLLED DIABETES MELLITUS: Chronic | ICD-10-CM

## 2023-10-11 DIAGNOSIS — E78.5 HYPERLIPIDEMIA LDL GOAL <70: Chronic | ICD-10-CM

## 2023-10-11 DIAGNOSIS — Z12.5 ENCOUNTER FOR PROSTATE CANCER SCREENING: ICD-10-CM

## 2023-10-11 NOTE — PROGRESS NOTES
The ABCs of the Annual Wellness Visit  Subsequent Medicare Wellness Visit    Chief Complaint   Patient presents with    Medicare Wellness-subsequent     Had flu vaccine at [facility] rx will get dates       Subjective    History of Present Illness:  Geoff Edwards is a 73 y.o. male who presents for a Subsequent Medicare Wellness Visit.    The following portions of the patient's history were reviewed and   updated as appropriate: allergies, current medications, past family history, past medical history, past social history, past surgical history and problem list.    Compared to one year ago, the patient feels his physical   health is the same.    Compared to one year ago, the patient feels his mental   health is the same.    Recent Hospitalizations:  He was not admitted to the hospital during the last year.       Current Medical Providers:  Patient Care Team:  Ochoa Hairston MD as PCP - General (Internal Medicine)  Ric Tobin MD as Consulting Physician (Urology)  Fernando Solano Jr., MD as Consulting Physician (Otolaryngology)  Luke Dixon MD as Cardiologist (Cardiology)  Erick Singer APRN as Nurse Practitioner (Cardiology)    Outpatient Medications Prior to Visit   Medication Sig Dispense Refill    amLODIPine (NORVASC) 5 MG tablet Take 1 tablet by mouth Daily. 90 tablet 3    aspirin (aspirin) 81 MG EC tablet Take 1 tablet by mouth Daily. 30 tablet 11    cloNIDine (Catapres) 0.1 MG tablet Take 1 tablet by mouth 2 (Two) Times a Day As Needed for High Blood Pressure (> 150/90). (Patient taking differently: Take 1 tablet by mouth Daily.) 60 tablet 11    Contour Next Test test strip USE ONE EVERY  each 3    diclofenac (VOLTAREN) 50 MG EC tablet Take 1 tablet by mouth 2 (Two) Times a Day As Needed (foot pain). 20 tablet 0    irbesartan (AVAPRO) 300 MG tablet TAKE 1 TABLET BY MOUTH DAILY 90 tablet 1    Lancets Ultra Thin misc 1 (ONE) MISC DAILY 100 each 3    rosuvastatin  "(CRESTOR) 5 MG tablet TAKE 1 TABLET BY MOUTH DAILY. 90 tablet 3     No facility-administered medications prior to visit.       No opioid medication identified on active medication list. I have reviewed chart for other potential  high risk medication/s and harmful drug interactions in the elderly.        Aspirin is on active medication list. Aspirin use is indicated based on review of current medical condition/s. Pros and cons of this therapy have been discussed today. Benefits of this medication outweigh potential harm.  Patient has been encouraged to continue taking this medication.  .      Patient Active Problem List   Diagnosis    Encounter for screening for malignant neoplasm of colon    Benign hypertension    Shoulder strain, left, sequela    Elevated prostate specific antigen (PSA)    Diet-controlled diabetes mellitus    Hip pain, chronic, right    Baker's cyst of knee, right    Non-occlusive coronary artery disease    Hyperlipidemia LDL goal <70    Urinary retention    Abnormal ECG Anterior infarct     RICARDO on CPAP    Paroxysmal SVT (supraventricular tachycardia)    Trapezius muscle strain, left, sequela     Advance Care Planning  Advance Directive is not on file.  ACP discussion was held with the patient during this visit. Patient does not have an advance directive, information provided.       Objective    Vitals:    10/11/23 0740   BP: 120/64   BP Location: Left arm   Patient Position: Sitting   Cuff Size: Adult   Pulse: 51   Temp: 97.6 øF (36.4 øC)   TempSrc: Temporal   SpO2: 99%   Weight: 83 kg (183 lb)   Height: 180.3 cm (71\")   PainSc: 0-No pain     Estimated body mass index is 25.52 kg/mý as calculated from the following:    Height as of this encounter: 180.3 cm (71\").    Weight as of this encounter: 83 kg (183 lb).           Does the patient have evidence of cognitive impairment? No                HEALTH RISK ASSESSMENT    Smoking Status:  Social History     Tobacco Use   Smoking Status Never "   Smokeless Tobacco Never     Alcohol Consumption:  Social History     Substance and Sexual Activity   Alcohol Use No     Fall Risk Screen:    KARTHIKADI Fall Risk Assessment was completed, and patient is at LOW risk for falls.Assessment completed on:10/11/2023    Depression Screening:      10/11/2023     7:39 AM   PHQ-2/PHQ-9 Depression Screening   Little Interest or Pleasure in Doing Things 0-->not at all   Feeling Down, Depressed or Hopeless 0-->not at all   PHQ-9: Brief Depression Severity Measure Score 0       Health Habits and Functional and Cognitive Screening:      10/11/2023     7:38 AM   Functional & Cognitive Status   Do you have difficulty preparing food and eating? No   Do you have difficulty bathing yourself, getting dressed or grooming yourself? No   Do you have difficulty using the toilet? No   Do you have difficulty moving around from place to place? No   Do you have trouble with steps or getting out of a bed or a chair? No   Current Diet Well Balanced Diet   Dental Exam Up to date   Eye Exam Up to date   Exercise (times per week) 3 times per week   Current Exercises Include Walking;Other   Do you need help using the phone?  No   Are you deaf or do you have serious difficulty hearing?  Yes   Do you need help to go to places out of walking distance? No   Do you need help shopping? No   Do you need help preparing meals?  No   Do you need help with housework?  No   Do you need help with laundry? No   Do you need help taking your medications? No   Do you need help managing money? No   Do you ever drive or ride in a car without wearing a seat belt? No   Have you felt unusual stress, anger or loneliness in the last month? No   Who do you live with? Spouse   If you need help, do you have trouble finding someone available to you? No   Have you been bothered in the last four weeks by sexual problems? No   Do you have difficulty concentrating, remembering or making decisions? No       Age-appropriate Screening  Schedule:  Refer to the list below for future screening recommendations based on patient's age, sex and/or medical conditions. Orders for these recommended tests are listed in the plan section. The patient has been provided with a written plan.    Health Maintenance   Topic Date Due    TDAP/TD VACCINES (1 - Tdap) Never done    ZOSTER VACCINE (1 of 2) Never done    URINE MICROALBUMIN  04/08/2023    COVID-19 Vaccine (5 - 2023-24 season) 09/01/2023    LIPID PANEL  12/29/2023    DIABETIC FOOT EXAM  01/03/2024    DIABETIC EYE EXAM  01/03/2024    BMI FOLLOWUP  01/03/2024    HEMOGLOBIN A1C  02/03/2024    ANNUAL WELLNESS VISIT  10/11/2024    COLORECTAL CANCER SCREENING  11/21/2027    HEPATITIS C SCREENING  Completed    INFLUENZA VACCINE  Completed    Pneumococcal Vaccine 65+  Completed              Assessment & Plan   CMS Preventative Services Quick Reference  Risk Factors Identified During Encounter  Immunizations Discussed/Encouraged: Tdap, Shingrix, and COVID19  Dental Screening Recommended  Vision Screening Recommended  The above risks/problems have been discussed with the patient.  Follow up actions/plans if indicated are seen below in the Assessment/Plan Section.  Pertinent information has been shared with the patient in the After Visit Summary.    Diagnoses and all orders for this visit:    1. Benign hypertension (Primary)    2. Hyperlipidemia LDL goal <70  -     TSH Rfx On Abnormal To Free T4    3. Diet-controlled diabetes mellitus  -     Microalbumin / Creatinine Urine Ratio - Urine, Clean Catch    4. Encounter for prostate cancer screening  -     Cancel: PSA SCREENING    5. Chronic kidney disease, stage 2 (mild)  -     Comprehensive metabolic panel        Patient had flu shot at [facility] will have faxed over.  Put a date in as a place duque.  The patient's BP is stable.  Recommend 150 minutes weekly per CDC recommendations.   Labs reviewed from occupational health visit through [employer].  Creatinine was  1.31, previously 1.18.  at baseline patient has CKD Stage 2.  Will check protine in urine.       PSA checked in May 2023.                     Result Review :  The following data was reviewed by: Ochoa Hairston MD on 10/11/2023:    Data reviewed : Above results reviewed      Follow Up:   Return in about 6 months (around 4/11/2024), or if symptoms worsen or fail to improve, for Recheck.     An After Visit Summary and PPPS were made available to the patient.                         CYNTHIA Hairston MD, FACP, [facility]      Electronically signed by Ochoa Hairston MD, 10/11/23, 8:12 AM CDT.

## 2023-10-12 LAB
ALBUMIN SERPL-MCNC: 4.5 G/DL (ref 3.5–5.2)
ALBUMIN/CREAT UR: 3 MG/G CREAT (ref 0–29)
ALBUMIN/GLOB SERPL: 2 G/DL
ALP SERPL-CCNC: 82 U/L (ref 39–117)
ALT SERPL-CCNC: 23 U/L (ref 1–41)
AST SERPL-CCNC: 19 U/L (ref 1–40)
BILIRUB SERPL-MCNC: 0.9 MG/DL (ref 0–1.2)
BUN SERPL-MCNC: 20 MG/DL (ref 8–23)
BUN/CREAT SERPL: 17.5 (ref 7–25)
CALCIUM SERPL-MCNC: 9.5 MG/DL (ref 8.6–10.5)
CHLORIDE SERPL-SCNC: 106 MMOL/L (ref 98–107)
CO2 SERPL-SCNC: 28.1 MMOL/L (ref 22–29)
CREAT SERPL-MCNC: 1.14 MG/DL (ref 0.76–1.27)
CREAT UR-MCNC: 159.1 MG/DL
EGFRCR SERPLBLD CKD-EPI 2021: 67.1 ML/MIN/1.73
GLOBULIN SER CALC-MCNC: 2.2 GM/DL
GLUCOSE SERPL-MCNC: 104 MG/DL (ref 65–99)
MICROALBUMIN UR-MCNC: 5.4 UG/ML
POTASSIUM SERPL-SCNC: 4.3 MMOL/L (ref 3.5–5.2)
PROT SERPL-MCNC: 6.7 G/DL (ref 6–8.5)
SODIUM SERPL-SCNC: 142 MMOL/L (ref 136–145)
TSH SERPL DL<=0.005 MIU/L-ACNC: 3.94 UIU/ML (ref 0.27–4.2)

## 2023-10-30 ENCOUNTER — TELEPHONE (OUTPATIENT)
Dept: INTERNAL MEDICINE | Facility: CLINIC | Age: 73
End: 2023-10-30

## 2023-10-30 ENCOUNTER — CLINICAL SUPPORT (OUTPATIENT)
Dept: INTERNAL MEDICINE | Facility: CLINIC | Age: 73
End: 2023-10-30
Payer: MEDICARE

## 2023-10-30 DIAGNOSIS — Z23 NEED FOR COVID-19 VACCINE: Primary | ICD-10-CM

## 2023-10-30 NOTE — TELEPHONE ENCOUNTER
Caller: Geoff Edwards    Relationship to patient: Self    Best call back number: [phone]     Patient is needing: PATIENT CALLED AND ASKED WHEN THE COVID SHOT WOULD BE AVAILABLE? PLEASE CALL THE PATIENT TO SCHEDULE WHEN THESE ARE AVAILABLE.

## 2023-11-03 ENCOUNTER — TELEPHONE (OUTPATIENT)
Dept: INTERNAL MEDICINE | Facility: CLINIC | Age: 73
End: 2023-11-03

## 2023-11-03 NOTE — TELEPHONE ENCOUNTER
Caller: Geoff Edwards    Relationship: Self    Best call back number: [phone]     What is the best time to reach you: ANY    Who are you requesting to speak with (clinical staff, provider,  specific staff member): CLINICAL    What was the call regarding:     PATIENT IS WONDERING IF HE IS A DIABETIC?     Is it okay if the provider responds through MyChart: NO   Normal spontaneous vaginal delivery

## 2023-11-06 ENCOUNTER — OFFICE VISIT (OUTPATIENT)
Dept: INTERNAL MEDICINE | Facility: CLINIC | Age: 73
End: 2023-11-06
Payer: MEDICARE

## 2023-11-06 VITALS
DIASTOLIC BLOOD PRESSURE: 70 MMHG | BODY MASS INDEX: 26.18 KG/M2 | TEMPERATURE: 98 F | HEART RATE: 70 BPM | HEIGHT: 71 IN | WEIGHT: 187 LBS | SYSTOLIC BLOOD PRESSURE: 128 MMHG | OXYGEN SATURATION: 98 %

## 2023-11-06 DIAGNOSIS — M21.619 BUNION OF GREAT TOE: ICD-10-CM

## 2023-11-06 DIAGNOSIS — E66.3 OVERWEIGHT (BMI 25.0-29.9): ICD-10-CM

## 2023-11-06 DIAGNOSIS — E11.9 DIET-CONTROLLED DIABETES MELLITUS: Primary | ICD-10-CM

## 2023-11-06 DIAGNOSIS — L84 CALLUS OF TOE: ICD-10-CM

## 2023-11-06 DIAGNOSIS — I10 ESSENTIAL HYPERTENSION: ICD-10-CM

## 2023-11-06 LAB — HBA1C MFR BLD: 6 % (ref 4.5–5.7)

## 2023-11-06 NOTE — PROGRESS NOTES
"    Chief Complaint  Diabetic Shoes (Pt here to inquire about diabetic shoes. /A1c- 6.0%)    Subjective        Geoff Edwards presents to [facility]  History of Present Illness  See below.     Objective   Vital Signs:  /70 (BP Location: Left arm, Patient Position: Sitting, Cuff Size: Adult)   Pulse 70   Temp 98 °F (36.7 °C) (Infrared)   Ht 180.3 cm (71\")   Wt 84.8 kg (187 lb)   SpO2 98%   BMI 26.08 kg/m²   Estimated body mass index is 26.08 kg/m² as calculated from the following:    Height as of this encounter: 180.3 cm (71\").    Weight as of this encounter: 84.8 kg (187 lb).       Physical Exam  Constitutional:       Comments: Looks much younger than his stated age.   HENT:      Head: Normocephalic and atraumatic.   Eyes:      Conjunctiva/sclera: Conjunctivae normal.      Pupils: Pupils are equal, round, and reactive to light.      Comments: Arcus senilis.   Pulmonary:      Effort: Pulmonary effort is normal. No respiratory distress.   Musculoskeletal:         General: No swelling.      Cervical back: Neck supple.   Skin:     General: Skin is warm and dry.      Findings: No rash.      Comments: See below.   Neurological:      General: No focal deficit present.      Mental Status: He is alert and oriented to person, place, and time.   Psychiatric:         Mood and Affect: Mood normal.         Behavior: Behavior normal.         Thought Content: Thought content normal.         Judgment: Judgment normal.     Diabetic foot exam:   Left: Filament test present   Pulses Dorsalis Pedis:  present  Posterior Tibial:  present   Reflexes 2+    Vibratory sensation normal   Proprioception normal   Sharp/dull discrimination normal     Right: Filament test present   Pulses Dorsalis Pedis:  present  Posterior Tibial:  present   Reflexes 2+    Vibratory sensation normal   Proprioception normal   Sharp/dull discrimination normal    Result Review :           Assessment and Plan "   Diagnoses and all orders for this visit:    1. Diet-controlled diabetes mellitus (Primary)  -     POC Glycated Hemoglobin, Total  -     Footwear Diabetic    2. Bunion of great toe, bilateral  -     Footwear Diabetic    3. Callus of toe, bilateral  -     Footwear Diabetic    4. Essential hypertension    5. Overweight (BMI 25.0-29.9)    Regular patient of Dr. Hairston.  Presents today requesting diabetic shoes.  He last saw Dr. Hairston on 10/11.    He has a history of diet-controlled diabetes.  He was first diagnosed in September 2022 after being prediabetic previous to that.  He has done well with diet and lifestyle modification.  Hemoglobin A1c is 6.0 today after being 6.2 in April.    He has some bunions to the bilateral great toes with bony hypertrophy.  He has early callus formation.  No ulcer.  No other problems identified on diabetic foot exam today other than this.  I feel it is reasonable to order him diabetic shoes at this point in time.  The order has been placed, printed off.  He plans to visit [facility].     BP controlled 128/70. Continue amlodipine and irbesartan.    He raises no other issues today.  He sees Dr. Hairston again on 4/10/2024.  He can return sooner if problems.       Follow Up   Return for Next scheduled follow up.  Patient was given instructions and counseling regarding his condition or for health maintenance advice. Please see specific information pulled into the AVS if appropriate.

## 2024-01-03 ENCOUNTER — TELEPHONE (OUTPATIENT)
Dept: INTERNAL MEDICINE | Facility: CLINIC | Age: 74
End: 2024-01-03

## 2024-01-03 NOTE — TELEPHONE ENCOUNTER
Caller: Geoff Edwards    Relationship: Self    Best call back number: [phone]     What form or medical record are you requesting: SLEEP STUDY RESULTS     Who is requesting this form or medical record from you: PCM PROVIDER     How would you like to receive the form or medical records (pick-up, mail, fax): FAX  If fax, what is the fax number: [phone]    Timeframe paperwork needed: ASAP

## 2024-01-24 RX ORDER — IRBESARTAN 300 MG/1
300 TABLET ORAL DAILY
Qty: 90 TABLET | Refills: 3 | Status: SHIPPED | OUTPATIENT
Start: 2024-01-24

## 2024-03-10 ENCOUNTER — APPOINTMENT (OUTPATIENT)
Dept: GENERAL RADIOLOGY | Facility: HOSPITAL | Age: 74
End: 2024-03-10
Payer: MEDICARE

## 2024-03-10 ENCOUNTER — HOSPITAL ENCOUNTER (EMERGENCY)
Facility: HOSPITAL | Age: 74
Discharge: HOME | End: 2024-03-10
Admitting: EMERGENCY MEDICINE
Payer: MEDICARE

## 2024-03-10 VITALS
HEART RATE: 62 BPM | DIASTOLIC BLOOD PRESSURE: 76 MMHG | SYSTOLIC BLOOD PRESSURE: 132 MMHG | OXYGEN SATURATION: 96 % | RESPIRATION RATE: 18 BRPM | HEIGHT: 71 IN | WEIGHT: 180 LBS | BODY MASS INDEX: 25.2 KG/M2 | TEMPERATURE: 97.6 F

## 2024-03-10 DIAGNOSIS — M25.561 ACUTE PAIN OF RIGHT KNEE: Primary | ICD-10-CM

## 2024-03-10 PROCEDURE — 73590 X-RAY EXAM OF LOWER LEG: CPT

## 2024-03-10 PROCEDURE — 73562 X-RAY EXAM OF KNEE 3: CPT

## 2024-03-10 PROCEDURE — 99283 EMERGENCY DEPT VISIT LOW MDM: CPT

## 2024-03-10 RX ORDER — NAPROXEN 500 MG/1
500 TABLET ORAL 2 TIMES DAILY PRN
Qty: 14 TABLET | Refills: 0 | Status: SHIPPED | OUTPATIENT
Start: 2024-03-10 | End: 2024-03-10 | Stop reason: SDUPTHER

## 2024-03-10 RX ORDER — NAPROXEN 500 MG/1
500 TABLET ORAL 2 TIMES DAILY PRN
Qty: 14 TABLET | Refills: 0 | Status: SHIPPED | OUTPATIENT
Start: 2024-03-10 | End: 2024-03-17

## 2024-03-10 NOTE — DISCHARGE INSTRUCTIONS
It was very nice to meet you, Geoff. Thank you for allowing us to take care of you today at [facility].    Today you were seen in the emergency department for your symptoms. Please understand that an ER evaluation is just the start of your evaluation. We do the best we can, but we are often unable to fully find what is causing your symptoms from one evaluation.  Because of this, the goal is to determine whether you need to be evaluated in the hospital or if it is safe for you to go home and see other doctors provided such as primary care physicians or specialist on an outpatient basis.     Like we discussed, I strongly urge that you follow up with your primary care doctor and orthopedics. Please call their office to set up an appointment as soon as possible so that you can be re-evaluated for improvement in your symptoms or for any other questions.  I have provided the information needed, including phone number, to call to set up an appointment below in these discharge papers.     Educational material has also been provided in the following pages regarding what we have discussed today.     MEDICATIONS PRESCRIBED: Naproxen as needed for pain relief.     Please return to the emergency room within 12-48 hours if you experience symptoms such as the following:   Fever, chills, chest pain or shortness of breath, pain with inspiration/expiration, pain that travels to your arms, neck or back, nausea, vomiting, severe headache, tearing pain in your chest, dizziness, feel as though you are about to pass out, OR if you have any worsening symptoms, or any other concerns.

## 2024-03-10 NOTE — ED PROVIDER NOTES
"Subjective   History of Present Illness  Patient is a 73-year-old male that presents to the emergency department with right knee pain.  Patient reports pain began yesterday after he was doing some \"exercises\" with his right lower extremity.  Patient denies any recent fall or injury to the affected extremity.  Patient reports he is able to ambulate without difficulty on the right lower extremity but does report pain with ambulation.  Patient denies any numbness, tingling, or loss of sensation to either lower extremity.  Denies any urinary or neurologic changes.  Denies any cough, congestion, fevers, body aches, or chills.  Denies any chest pain, shortness of breath, abdominal pain, nausea, vomiting, constipation, or diarrhea.      Review of Systems   Constitutional:  Positive for activity change.   Musculoskeletal:  Positive for arthralgias, joint swelling and myalgias.        Right knee pain    All other systems reviewed and are negative.      Past Medical History:   Diagnosis Date    Abnormal PSA     Chest pain     Diabetes mellitus     diet controlled    Elevated cholesterol     Hypertension     PONV (postoperative nausea and vomiting)     SEVERE    Seasonal allergies     Sleep apnea        Allergies   Allergen Reactions    Ace Inhibitors Cough       Past Surgical History:   Procedure Laterality Date    APPENDECTOMY      CARDIAC CATHETERIZATION      CARDIAC CATHETERIZATION Left 8/5/2021    Procedure: Cardiac Catheterization/Vascular Study;  Surgeon: Luke Dixon MD;  Location: [facility] CATH INVASIVE LOCATION;  Service: Cardiology;  Laterality: Left;    COLONOSCOPY  11/16/2012    Normal. Repeat in 5 years. Dr. Tez Perdomo    COLONOSCOPY N/A 11/21/2017    Procedure: COLONOSCOPY WITH ANESTHESIA;  Surgeon: Tez Perdomo MD;  Location: [facility] ENDOSCOPY;  Service:     CYSTOSCOPY TRANSURETHRAL RESECTION OF PROSTATE N/A 8/30/2021    Procedure: CYSTOSCOPY TRANSURETHRAL RESECTION OF PROSTATE;  Surgeon: Ric Tobin " MD Gm;  Location:  [facility] OR;  Service: Urology;  Laterality: N/A;    MANDIBLE SURGERY      PROSTATE BIOPSY      PROSTATE BIOPSY N/A 3/19/2020    Procedure: PROSTATE ULTRASOUND BIOPSY MRI FUSION WITH URONAV;  Surgeon: Ric Tobin MD;  Location:  [facility] OR;  Service: Urology;  Laterality: N/A;       Family History   Problem Relation Age of Onset    Heart disease Father     No Known Problems Mother     No Known Problems Brother     No Known Problems Brother     No Known Problems Sister     No Known Problems Sister     No Known Problems Sister     No Known Problems Sister     Stomach cancer Sister     Cancer Sister 69    Colon cancer Neg Hx     Colon polyps Neg Hx        Social History     Socioeconomic History    Marital status:    Tobacco Use    Smoking status: Never    Smokeless tobacco: Never   Vaping Use    Vaping status: Never Used   Substance and Sexual Activity    Alcohol use: No    Drug use: No    Sexual activity: Not Currently     Partners: Female           Objective   Physical Exam  Vitals and nursing note reviewed.   Constitutional:       Appearance: Normal appearance.      Comments: Nontoxic appearing. In no acute distress.    HENT:      Head: Normocephalic and atraumatic.      Right Ear: External ear normal.      Left Ear: External ear normal.      Nose: Nose normal.      Mouth/Throat:      Mouth: Mucous membranes are moist.      Pharynx: Oropharynx is clear.   Eyes:      Extraocular Movements: Extraocular movements intact.      Conjunctiva/sclera: Conjunctivae normal.      Pupils: Pupils are equal, round, and reactive to light.   Cardiovascular:      Rate and Rhythm: Normal rate and regular rhythm.      Pulses: Normal pulses.      Heart sounds: Normal heart sounds.   Pulmonary:      Effort: Pulmonary effort is normal. No respiratory distress.      Breath sounds: Normal breath sounds. No wheezing.   Chest:      Chest wall: No tenderness.   Abdominal:      General: Abdomen is flat.  "Bowel sounds are normal. There is no distension.      Palpations: Abdomen is soft.      Tenderness: There is no abdominal tenderness. There is no right CVA tenderness, left CVA tenderness, guarding or rebound.   Musculoskeletal:         General: Tenderness present. Normal range of motion.      Cervical back: Normal range of motion and neck supple.      Right lower leg: No edema.      Left lower leg: No edema.      Comments: Tenderness noted to anterior portion of right knee. Patient reports increased pain with flexion. He is able to ambulate without difficulty. No obvious bruising, swelling, or deformities noted upon exam. Pulses palpable, strong and equal bilaterally. Patient is neurovascularly intact.    Skin:     General: Skin is warm and dry.      Capillary Refill: Capillary refill takes less than 2 seconds.   Neurological:      General: No focal deficit present.      Mental Status: He is alert and oriented to person, place, and time. Mental status is at baseline.   Psychiatric:         Mood and Affect: Mood normal.         Behavior: Behavior normal.         Thought Content: Thought content normal.         Judgment: Judgment normal.       XR Tibia Fibula 2 View Right   Final Result   1. No evidence of acute fracture.   2. Degenerative osteoarthritis right knee.           This report was signed and finalized on 3/10/2024 9:05 AM by Dr. Celio Rodriguez MD.          XR Knee 3 View Right   Final Result   1. No fracture is identified. There may be a small joint effusion.   2. Degenerative osteoarthritis, as described.           This report was signed and finalized on 3/10/2024 9:07 AM by Dr. Celio Rodriguez MD.               Procedures           ED Course                                             Medical Decision Making  Geoff Edwards is a 73 y.o. male who presents to the ED with right knee pain.  Patient reports pain began yesterday after he was doing some \"exercises\" with his right lower extremity.  " Patient denies any recent fall or injury to the affected extremity.  Patient reports he is able to ambulate without difficulty on the right lower extremity but does report pain with ambulation.  Patient denies any numbness, tingling, or loss of sensation to either lower extremity.  Denies any urinary or neurologic changes.  Denies any cough, congestion, fevers, body aches, or chills.  Denies any chest pain, shortness of breath, abdominal pain, nausea, vomiting, constipation, or diarrhea.    Patient was non-toxic appearing on arrival. No acute distress was noted.  Vital signs stable.     Past medical history, surgical history, and medication regimen reviewed.     Patient's presentation raises suspicion for differentials including, but not limited to, fracture, dislocation, ligamentous injury.    Please refer to above section of note for imaging results that were reviewed and interpreted by radiology as well as attending physician.     Given findings described above, patient's presentation is likely consistent with acute right knee pain. I have a low suspicion for acute fracture or dislocation at this point in their ED course.      I had an in-depth discussion with the patient regarding ED imaging completed during today's ED encounter.  We discussed that there does not appear to be any evidence of new fracture or dislocation.  We discussed that there was a small joint effusion with some arthritic changes.  Discussed that patient be discharged with naproxen that he may use as needed for pain relief.  We also discussed the importance of resting the affected extremity as well as intermittent ice application to help with swelling.  I discussed that if pain continues or does not improve I have provided patient with orthopedics information on discharge paperwork. I answered all the questions regarding the emergency department evaluation, diagnosis, and treatment plan in plain and simple language that was understandable. We  discussed that due to always having some diagnostic uncertainty while in the ER, there is always a chance that symptoms may change or new symptoms may reveal themselves after being discharged. Because of this, I stressed the importance of Geoff following up with their PCP and orthopedics. Patient informed that appointment will need to be done by calling their office to set up an appointment within the next few days or as soon as reasonably possible so that the symptoms can be re-evaluated for improvement or for any other questions. I also gave Geoff common sense return precautions and prompted patient to return to the emergency department within 24 - 48hrs if there are any new, worsening, or concerning symptoms. The patient verbalized understanding of the discharge instructions and agreed with them. Geoff was discharged in stable condition.     Dragon disclaimer:  Parts of this note may be an electronic transcription/translation of spoken language to printed text using the Dragon dictation system.       Problems Addressed:  Acute pain of right knee: complicated acute illness or injury    Amount and/or Complexity of Data Reviewed  Radiology: ordered.    Risk  Prescription drug management.        Final diagnoses:   Acute pain of right knee       ED Disposition  ED Disposition       ED Disposition   Discharge    Condition   Stable    Comment   --               Ochoa Hairston MD  [address] [place] [address]   [facility] [id number] [phone]    Schedule an appointment as soon as possible for a visit       [facility] EMERGENCY DEPARTMENT  [address] [place] [id number] [phone]    If symptoms worsen    [facility] ORTHOPEDICS  [address] [facility] [address] [phone]  Schedule an appointment as soon as possible for a visit            Medication List        New Prescriptions      naproxen 500 MG tablet  Commonly known as: NAPROSYN  Take 1 tablet  by mouth 2 (Two) Times a Day As Needed for Mild Pain for up to 7 days.            Changed      cloNIDine 0.1 MG tablet  Commonly known as: Catapres  Take 1 tablet by mouth 2 (Two) Times a Day As Needed for High Blood Pressure (> 150/90).  What changed: when to take this               Where to Get Your Medications        These medications were sent to [id number] - [place], [place] - [address] - [phone]  - [phone]   [address], [facility] [address]      Phone: [phone]   naproxen 500 MG tablet            Darren Poole, APRN  03/10/24 [id number]

## 2024-04-01 ENCOUNTER — OFFICE VISIT (OUTPATIENT)
Dept: CARDIOLOGY | Facility: CLINIC | Age: 74
End: 2024-04-01
Payer: MEDICARE

## 2024-04-01 ENCOUNTER — TELEPHONE (OUTPATIENT)
Dept: UROLOGY | Facility: CLINIC | Age: 74
End: 2024-04-01
Payer: MEDICARE

## 2024-04-01 VITALS
OXYGEN SATURATION: 98 % | SYSTOLIC BLOOD PRESSURE: 110 MMHG | DIASTOLIC BLOOD PRESSURE: 70 MMHG | HEIGHT: 71 IN | BODY MASS INDEX: 26.04 KG/M2 | HEART RATE: 70 BPM | WEIGHT: 186 LBS

## 2024-04-01 DIAGNOSIS — G47.33 OBSTRUCTIVE SLEEP APNEA: ICD-10-CM

## 2024-04-01 DIAGNOSIS — I10 PRIMARY HYPERTENSION: ICD-10-CM

## 2024-04-01 DIAGNOSIS — I25.10 NON-OCCLUSIVE CORONARY ARTERY DISEASE: Primary | ICD-10-CM

## 2024-04-01 DIAGNOSIS — I47.10 PAROXYSMAL SVT (SUPRAVENTRICULAR TACHYCARDIA): ICD-10-CM

## 2024-04-01 DIAGNOSIS — R00.1 BRADYCARDIA, SINUS: ICD-10-CM

## 2024-04-01 DIAGNOSIS — E11.9 DIET-CONTROLLED DIABETES MELLITUS: ICD-10-CM

## 2024-04-01 DIAGNOSIS — E78.5 HYPERLIPIDEMIA LDL GOAL <70: ICD-10-CM

## 2024-04-01 NOTE — TELEPHONE ENCOUNTER
----- Message from Rasheeda Etienne MA sent at 4/1/2024 12:35 PM CDT -----  Per Mahad's note in 2021: Follow-up with Johnson in May and I will add PSA to that appointment.     Pt needs to stay with Johnson

## 2024-04-01 NOTE — PROGRESS NOTES
Subjective:     Encounter Date: 04/01/2024      Patient ID: Geoff Edwards is a 73 y.o. male with non obstructive coronary artery disease, hypertension, and obstructive sleep apnea    Chief Complaint: routine follow up  History of Present Illness    Patient presents today for management of non obstructive coronary artery disease. He reports that he has been doing well. He denies any chest pain. He denies any dyspnea on exertion. He reports that he has been using his CPAP nightly. He denies any fatigue, decreased stamina or dyspnea on exertion. He denies any leg swelling, orthopnea or PND. He denies any palpitations or heart racing. Patient denies any dizziness and near syncope. BP has been well controlled at other dr visits. Patient follows with Dr Hairston as PCP.     The following portions of the patient's history were reviewed and updated as appropriate: allergies, current medications, past family history, past medical history, past social history, past surgical history and problem list.    Allergies   Allergen Reactions    Ace Inhibitors Cough       Current Outpatient Medications:     amLODIPine (NORVASC) 5 MG tablet, Take 1 tablet by mouth Daily., Disp: 90 tablet, Rfl: 3    aspirin (aspirin) 81 MG EC tablet, Take 1 tablet by mouth Daily., Disp: 30 tablet, Rfl: 11    cloNIDine (Catapres) 0.1 MG tablet, Take 1 tablet by mouth 2 (Two) Times a Day As Needed for High Blood Pressure (> 150/90). (Patient taking differently: Take 1 tablet by mouth Daily.), Disp: 60 tablet, Rfl: 11    Contour Next Test test strip, USE ONE EVERY DAY, Disp: 100 each, Rfl: 3    irbesartan (AVAPRO) 300 MG tablet, TAKE 1 TABLET BY MOUTH ONCE DAILY, Disp: 90 tablet, Rfl: 3    Lancets Ultra Thin misc, 1 (ONE) MISC DAILY, Disp: 100 each, Rfl: 3    rosuvastatin (CRESTOR) 5 MG tablet, TAKE 1 TABLET BY MOUTH DAILY., Disp: 90 tablet, Rfl: 3      Past Medical History:   Diagnosis Date    Abnormal PSA     Chest pain     Diabetes mellitus      diet controlled    Elevated cholesterol     Hypertension     PONV (postoperative nausea and vomiting)     SEVERE    Seasonal allergies     Sleep apnea      Social History     Socioeconomic History    Marital status:    Tobacco Use    Smoking status: Never    Smokeless tobacco: Never   Vaping Use    Vaping status: Never Used   Substance and Sexual Activity    Alcohol use: No    Drug use: No    Sexual activity: Not Currently     Partners: Female       Review of Systems   Constitutional: Negative for malaise/fatigue.   HENT:  Negative for hoarse voice.    Cardiovascular:  Negative for chest pain, dyspnea on exertion, irregular heartbeat, leg swelling, near-syncope, orthopnea, palpitations and paroxysmal nocturnal dyspnea.   Respiratory:  Negative for shortness of breath.    Hematologic/Lymphatic: Does not bruise/bleed easily.   Genitourinary:  Negative for hematuria.   Neurological:  Negative for dizziness and headaches.   All other systems reviewed and are negative.         Objective:     Vitals reviewed.   Constitutional:       General: Not in acute distress.     Appearance: Normal appearance. Well-developed.   Eyes:      Pupils: Pupils are equal, round, and reactive to light.   HENT:      Head: Normocephalic and atraumatic.      Nose: Nose normal.   Neck:      Vascular: No carotid bruit.   Pulmonary:      Effort: Pulmonary effort is normal. No respiratory distress.      Breath sounds: Normal breath sounds. No wheezing. No rales.   Cardiovascular:      Normal rate. Regular rhythm.      Murmurs: There is no murmur.   Edema:     Peripheral edema absent.   Abdominal:      General: There is no distension.      Palpations: Abdomen is soft.   Musculoskeletal: Normal range of motion.      Cervical back: Normal range of motion and neck supple. Skin:     General: Skin is warm.      Findings: No erythema or rash.   Neurological:      General: No focal deficit present.      Mental Status: Alert and oriented to person,  "place, and time.   Psychiatric:         Attention and Perception: Attention normal.         Mood and Affect: Mood normal.         Speech: Speech normal.         Behavior: Behavior normal.         Thought Content: Thought content normal.         Judgment: Judgment normal.         /70   Pulse 70   Ht 180.3 cm (70.98\")   Wt 84.4 kg (186 lb)   SpO2 98%   BMI 25.95 kg/m²       ECG 12 Lead    Date/Time: 4/1/2024 8:54 AM  Performed by: Erick Singer APRN    Authorized by: Erick Singer APRN  Comparison: compared with previous ECG from 8/31/2023  Similar to previous ECG  Rhythm: sinus bradycardia  Rate: bradycardic  BPM: 57          Lab Review:       Lab Results   Component Value Date    CHLPL 138 12/29/2022    TRIG 54 12/29/2022    HDL 60 12/29/2022    LDL 66 12/29/2022     Lab Results   Component Value Date    HGBA1C 6.0 (A) 11/06/2023     Results for orders placed during the hospital encounter of 03/02/22    Adult Transthoracic Echo Complete w/ Color, Spectral and Contrast if necessary per protocol    Interpretation Summary  · Left ventricular wall thickness is consistent with mild concentric hypertrophy.  · Left ventricular ejection fraction appears to be 61 - 65%. Left ventricular systolic function is normal.  · Abnormal global longitudinal LV strain (GLS) = -15.0%  · Left ventricular diastolic function was normal.  · Estimated right ventricular systolic pressure from tricuspid regurgitation is normal (<35 mmHg).    Conclusion of cardiac catheterization    8/5/2021      Normal left main coronary artery  Mid left anterior descending coronary artery has 30 to 40% stenosis with moderate atherosclerotic plaque with normal FFR of 0.86  FFR performed as patient has exertional chest pain and had abnormal stress echo suggestive of left anterior descending coronary artery distribution ischemia  Normal left circumflex coronary artery  Normal right coronary artery  Moderate tortuosity of epicardial vessels due to " uncontrolled hypertension  Mildly elevated left ventricular end-diastolic pressure of 14 mmHg.     ____________________________________________________________________________________________________________________________________________     Plan after cardiac catheterization     Medical management  Keep LDL well below 70 mg/dL  Start on Crestor 5 mg p.o. daily  Start amlodipine 5 mg p.o. daily  Sublingual nitroglycerin as needed for chest pain for possible small vessel disease  Aspirin 81 mg p.o. daily  Monitor blood pressures twice a day at home and bring recordings for office visit  Clonidine 0.1 mg p.o. twice daily as needed for blood pressure more than 150/90 mmHg     Intensive risk factor modifications for both primary and secondary prevention if applicable  Hydration   Observation      I have personally reviewed echo, LHC, labs and past office notes prior to patients visit  Assessment:          Diagnosis Plan   1. Non-occlusive coronary artery disease        2. Primary hypertension        3. Diet-controlled diabetes mellitus        4. Hyperlipidemia LDL goal <70        5. Paroxysmal SVT (supraventricular tachycardia)        6. Bradycardia, sinus        7. Obstructive sleep apnea                   Plan:       1. Coronary artery disease: non-obstructive disease: LHC 8/2021 showed 30-40% stenosis with moderate atherosclerotic plaque with normal FFR of 0.86. patient remains chest pain free. Continue aspirin, norvasc, rosuvastatin, and irbesartan.     2. Hypertension: Well controlled in office. Continue current medications.     3. Controlled Type 2 DM: A1C 6.0 11/2023. Managed and followed by PCP    4. Hyperlipidemia: Lipid panel from 12/29/2022 demonstrates well controlled cholesterol. Continue rosuvastatin. Managed and followed by PCP    5. Paroxysmal SVT: 31 runs noted on holter monitor 7/2022. Patient denies any symptoms at this time.     6. Bradycardia: EKG today shows heart rate 57. Patient is not on any  AV amaury blocking medications. Recommend patient to monitor. If he develops any symptoms concerning for bradycardia then notify PCP or our office for re-evaluation with holter monitor.    7. Obstructive sleep apnea: Reports compliance with CPAP. Discussed with patient that he may be a candidate for Inspire procedure with Dr Pereira    I attest that all portions of this note reviewed and all information has been updated by myself to reflect the patient's current status.      I spent 35 minutes caring for Gefof on this date of service. This time includes time spent by me in the following activities:preparing for the visit, reviewing tests, obtaining and/or reviewing a separately obtained history, performing a medically appropriate examination and/or evaluation , counseling and educating the patient/family/caregiver, and documenting information in the medical record    I spent 2 minutes on the separately reported service of EKG. This time is not included in the time used to support the E/M service also reported today.    Patient is to follow up in 1 year or sooner if needed

## 2024-04-09 ENCOUNTER — OFFICE VISIT (OUTPATIENT)
Dept: NEUROLOGY | Age: 74
End: 2024-04-09

## 2024-04-09 VITALS
BODY MASS INDEX: 24.92 KG/M2 | HEIGHT: 71 IN | DIASTOLIC BLOOD PRESSURE: 77 MMHG | HEART RATE: 63 BPM | WEIGHT: 178 LBS | RESPIRATION RATE: 18 BRPM | SYSTOLIC BLOOD PRESSURE: 128 MMHG

## 2024-04-09 DIAGNOSIS — G47.33 SLEEP APNEA, OBSTRUCTIVE: Primary | ICD-10-CM

## 2024-04-09 RX ORDER — NAPROXEN 500 MG/1
TABLET ORAL
COMMUNITY
Start: 2024-03-18

## 2024-04-09 RX ORDER — ASPIRIN 81 MG/1
81 TABLET ORAL DAILY
COMMUNITY
Start: 2021-08-18

## 2024-04-09 RX ORDER — IRBESARTAN 300 MG/1
300 TABLET ORAL DAILY
COMMUNITY
Start: 2024-01-24

## 2024-04-09 RX ORDER — AMLODIPINE BESYLATE 5 MG/1
5 TABLET ORAL DAILY
COMMUNITY
Start: 2023-07-25

## 2024-04-09 RX ORDER — ROSUVASTATIN CALCIUM 5 MG/1
5 TABLET, COATED ORAL DAILY
COMMUNITY
Start: 2023-08-15

## 2024-04-09 NOTE — PROGRESS NOTES
[facility]  [address] [facility], [address]  [address]  Phone [phone]  [phone]     [facility] NEW PATIENT VISIT        Patient: Vick Gold  :  1950  Age:  73 y.o.  MRN:  [id number]  Account #:  [id number]  Today:  24    Referring Provider: Crow Lockhart  [address] [facility] [address] [place] [address]  Consulting Provider: Darci Le M.D.    CHIEF COMPLAINT:  Chief Complaint   Patient presents with    New Patient    Sleep Apnea       History Source: History obtained from the patient.  PCP: Jonah Mendoza MD    HISTORY OF PRESENT ILLNESS:  Vick Gold is a 73 y.o. year old man with obstructive sleep apnea who was referred ongoing care.  He has atrial fibrillation and was noted to stop breathing in his sleep.  He had a home sleep apnea test in 2023 that showed SKYLAR with an AHI of 12.6 and lo oxygen saturation of 89%.  He was set up on APAP and has been using the device night.  He rests well.  He no longer snores or struggles to breath.  He is resting better and more alert in the daytime.      PAST MEDICAL HITORY:    Past Medical History:   Diagnosis Date    Hypertension     Sleep difficulties        Past Surgical History:   Procedure Laterality Date    APPENDECTOMY      KNEE ARTHROSCOPY      MANDIBLE FRACTURE SURGERY      PROSTATE SURGERY         Recent Hospitalizations  None    Significant Injuries  None    Habits  Vick Gold reports that he has never smoked. He has never used smokeless tobacco. He reports that he does not drink alcohol and does not use drugs.    Current Outpatient Medications   Medication Sig Dispense Refill    amLODIPine (NORVASC) 5 MG tablet Take 1 tablet by mouth daily      aspirin 81 MG EC tablet Take 1 tablet by mouth daily      irbesartan (AVAPRO) 300 MG tablet Take 1 tablet by mouth daily      naproxen (NAPROSYN) 500 MG tablet TAKE ONE TABLET BY MOUTH TWO TIMES A DAY AS NEEDED FOR PAIN      rosuvastatin

## 2024-04-10 ENCOUNTER — OFFICE VISIT (OUTPATIENT)
Dept: INTERNAL MEDICINE | Facility: CLINIC | Age: 74
End: 2024-04-10
Payer: MEDICARE

## 2024-04-10 VITALS
SYSTOLIC BLOOD PRESSURE: 110 MMHG | HEIGHT: 71 IN | WEIGHT: 186 LBS | DIASTOLIC BLOOD PRESSURE: 78 MMHG | TEMPERATURE: 97.8 F | OXYGEN SATURATION: 98 % | HEART RATE: 58 BPM | BODY MASS INDEX: 26.04 KG/M2

## 2024-04-10 DIAGNOSIS — I10 ESSENTIAL HYPERTENSION: Primary | ICD-10-CM

## 2024-04-10 DIAGNOSIS — M21.611 BILATERAL BUNIONS: ICD-10-CM

## 2024-04-10 DIAGNOSIS — G47.33 OSA ON CPAP: ICD-10-CM

## 2024-04-10 DIAGNOSIS — I25.10 NON-OCCLUSIVE CORONARY ARTERY DISEASE: ICD-10-CM

## 2024-04-10 DIAGNOSIS — E78.5 HYPERLIPIDEMIA LDL GOAL <70: Chronic | ICD-10-CM

## 2024-04-10 DIAGNOSIS — M21.612 BILATERAL BUNIONS: ICD-10-CM

## 2024-04-10 DIAGNOSIS — L84 PRE-ULCERATIVE CORN OR CALLOUS: ICD-10-CM

## 2024-04-10 DIAGNOSIS — E11.9 DIET-CONTROLLED DIABETES MELLITUS: Chronic | ICD-10-CM

## 2024-04-10 PROBLEM — S46.812S TRAPEZIUS MUSCLE STRAIN, LEFT, SEQUELA: Status: RESOLVED | Noted: 2023-03-16 | Resolved: 2024-04-10

## 2024-04-10 PROBLEM — S46.912S SHOULDER STRAIN, LEFT, SEQUELA: Status: RESOLVED | Noted: 2020-01-21 | Resolved: 2024-04-10

## 2024-04-10 NOTE — PROGRESS NOTES
"      Chief Complaint  Hypertension and Diabetes (Needs script for diabetic shoes)    Subjective        Geoff Edwards presents to [facility]    HPI    Patient here for the above problems.  See Assessment and Plan for further HPI components.      Review of Systems    Objective   Vital Signs:  /78 (BP Location: Left arm, Patient Position: Sitting, Cuff Size: Adult)   Pulse 58   Temp 97.8 °F (36.6 °C) (Temporal)   Ht 180.3 cm (71\")   Wt 84.4 kg (186 lb)   SpO2 98%   BMI 25.94 kg/m²   Estimated body mass index is 25.94 kg/m² as calculated from the following:    Height as of this encounter: 180.3 cm (71\").    Weight as of this encounter: 84.4 kg (186 lb).      Physical Exam  Vitals and nursing note reviewed.   Constitutional:       Appearance: He is not ill-appearing.   Eyes:      General: No scleral icterus.     Conjunctiva/sclera: Conjunctivae normal.   Cardiovascular:      Rate and Rhythm: Normal rate and regular rhythm.   Pulmonary:      Effort: Pulmonary effort is normal. No respiratory distress.   Feet:      Right foot:      Skin integrity: Callus present.      Left foot:      Skin integrity: Callus present.      Comments: 1st MTP callous bilateral   Bilateral bunions with some prominence     Neurological:      General: No focal deficit present.      Mental Status: He is alert and oriented to person, place, and time.   Psychiatric:         Mood and Affect: Mood normal.         Behavior: Behavior normal.                       Assessment and Plan   Diagnoses and all orders for this visit:    1. Essential hypertension (Primary)    2. Non-occlusive coronary artery disease    3. Diet-controlled diabetes mellitus    4. Hyperlipidemia LDL goal <70    5. RICARDO on CPAP    6. Pre-ulcerative corn or callous    7. Bilateral bunions        Patient has hypertension.  BP is at goal.  The patient's BP goal is < 130/80.  Recommend ambulatory BP monitoring after patient has taken " medication.  Recommend varying the times of the blood pressure readings.  Patient is currently on amlodipine, irbesartan.  Recommend we continue current regimen.    Continue to monitor.    Patient has hyperlipidemia.  Patient is currently on moderate-intensity statin with rosuvastatin 5 mg.  Patient is at goal  Recommend we continue current regimen.    Recommend increased physical activity.  Recommend a diet that focuses on consumption of fruits, vegetables, fiber, and monounsaturated fats and minimizes intake of saturated and trans fats, simple carbohydrates, and red meats. Examples of heart-healthy diets include the DASH (Dietary Approaches to Stop Hypertension) and Mediterranean diets.  Continue to monitor.  Patient's goal for LDL is < 70, patient is current at 62.  Continue moderate intensity statin.    Patient has preulcerative callous and bilateral bunions.  Patient would benefit from diabetic shoes.  Has had pairs previously and they have helped.  He monitors his feel closely  Diabetic Foot Exam Performed  Preulcerative callous bilateral 1st MTP and distal interphalangeal joint  Bilateral bunions    Patient has diabetes.  Current A1c is 6.4.    Based on patient's last A1c they are currently at goal.. The patient's A1c goal is < 7.0%  Recommend routine monitoring of blood sugar. Avoid hypoglycemia. Recommend ADA diet and avoidance of excess carbohydrates. Patient is currently on  no medications .  Recommend that we continue current regimen.  .  Recommend at least annual eye examination.  Patient is up-to-date.  Recommend at least annual diabetic foot examination.  Patient is up-to-date.  Recommend checking self checks of feet routinely.Patient has the following diabetic complications:  none present .    Continue to monitor.  Patient's last diabetic nephropathy evaluation was:  Creatinine, Urine (mg/dL)   Date/Time Value   10/11/2023 0719 159.1     Microalbumin, Urine (ug/mL)   Date/Time Value   10/11/2023 0719  5.4     Protein (no units)   Date/Time Value   04/08/2022 0701 Negative     Protein, POC (mg/dL)   Date/Time Value   05/23/2023 0827 Trace (A)     A1C Last 3 Results          11/6/2023    08:25 4/5/2024    07:16   HGBA1C Last 3 Results   Hemoglobin A1C 6.0  6.40       Recently saw Dr. Maldonado for RICARDO.  No changes at present.             Result Review :  The following data was reviewed by: Ochoa Hairston MD on 04/10/2024:  Lipid Panel          4/5/2024    07:16   Lipid Panel   Total Cholesterol 128    Triglycerides 43    HDL Cholesterol 56    VLDL Cholesterol 10    LDL Cholesterol  62      BMP          10/11/2023    07:19 4/5/2024    07:16   BMP   BUN 20  15    Creatinine 1.14  1.24    Sodium 142  144    Potassium 4.3  4.2    Chloride 106  108    CO2 28.1  26.1    Calcium 9.5  9.0      A1C Last 3 Results          11/6/2023    08:25 4/5/2024    07:16   HGBA1C Last 3 Results   Hemoglobin A1C 6.0  6.40           BMI is >= 25 and <30. (Overweight) The following options were offered after discussion;: exercise counseling/recommendations and nutrition counseling/recommendations      BMI is >= 25 and <30. (Overweight) The following options were offered after discussion;: exercise counseling/recommendations and nutrition counseling/recommendations            Follow Up   Return in about 6 months (around 10/10/2024), or if symptoms worsen or fail to improve, for Annual physical, Medicare Wellness. Longitudinal care.  Follow-up for above problems.    Patient was given instructions and counseling regarding his condition or for health maintenance advice. Please see specific information pulled into the AVS if appropriate.       CYNTHIA Hairston MD, FACP, FHM      Electronically signed by Ochoa Hairston MD, 04/10/24, 7:46 AM CDT.

## 2024-04-12 ASSESSMENT — ENCOUNTER SYMPTOMS
NAUSEA: 0
SHORTNESS OF BREATH: 0
PHOTOPHOBIA: 0
COUGH: 0
BACK PAIN: 1
VOMITING: 0

## 2024-04-18 DIAGNOSIS — E11.9 DIABETES MELLITUS WITHOUT COMPLICATION: ICD-10-CM

## 2024-04-18 RX ORDER — PEN NEEDLE, DIABETIC 31 GX5/16"
NEEDLE, DISPOSABLE MISCELLANEOUS
Qty: 100 EACH | Refills: 3 | Status: SHIPPED | OUTPATIENT
Start: 2024-04-18

## 2024-04-23 ENCOUNTER — TELEPHONE (OUTPATIENT)
Dept: INTERNAL MEDICINE | Facility: CLINIC | Age: 74
End: 2024-04-23
Payer: MEDICARE

## 2024-04-23 NOTE — TELEPHONE ENCOUNTER
Crystal from [employer] called regarding patient. She needs statement  of certifying physician for diabetic shoes faxed to [phone]. Phone number is [phone].

## 2024-05-10 NOTE — PROGRESS NOTES
Subjective    Mr. Edwards is 73 y.o. male    Chief Complaint: Elevated PSA    History of Present Illness  Patient presents for annual follow-up he does have a history of TURP in the past.  This was done 08/30/2021.  Prior PSA was 420 prior to his TURP.  TURP samples did not reveal any prostate cancer.  Voiding wise patient is doing well his most recent PSA is 0.86 which is stable from previous.  Lab Results   Component Value Date    PSA 0.867 05/15/2024    PSA 0.767 05/16/2023    PSA 0.646 05/17/2022       The following portions of the patient's history were reviewed and updated as appropriate: allergies, current medications, past family history, past medical history, past social history, past surgical history and problem list.    Review of Systems   Genitourinary: Negative.          Current Outpatient Medications:     amLODIPine (NORVASC) 5 MG tablet, Take 1 tablet by mouth Daily., Disp: 90 tablet, Rfl: 3    aspirin (aspirin) 81 MG EC tablet, Take 1 tablet by mouth Daily., Disp: 30 tablet, Rfl: 11    Contour Next Test test strip, USE ONE EVERY DAY, Disp: 100 each, Rfl: 3    irbesartan (AVAPRO) 300 MG tablet, TAKE 1 TABLET BY MOUTH ONCE DAILY, Disp: 90 tablet, Rfl: 3    Lancets Ultra Thin misc, USE ONE LANCET DAILY, Disp: 100 each, Rfl: 3    rosuvastatin (CRESTOR) 5 MG tablet, TAKE 1 TABLET BY MOUTH DAILY., Disp: 90 tablet, Rfl: 3    Past Medical History:   Diagnosis Date    Abnormal PSA     Chest pain     Diabetes mellitus     diet controlled    Elevated cholesterol     Hypertension     PONV (postoperative nausea and vomiting)     SEVERE    Seasonal allergies     Sleep apnea        Past Surgical History:   Procedure Laterality Date    APPENDECTOMY      CARDIAC CATHETERIZATION      CARDIAC CATHETERIZATION Left 8/5/2021    Procedure: Cardiac Catheterization/Vascular Study;  Surgeon: Luke Dixon MD;  Location:  [facility];  Service: Cardiology;  Laterality: Left;    COLONOSCOPY  11/16/2012     "Normal. Repeat in 5 years. Dr. Tez Perdomo    COLONOSCOPY N/A 11/21/2017    Procedure: COLONOSCOPY WITH ANESTHESIA;  Surgeon: Tez Perdomo MD;  Location:  [facility] ENDOS[facility];  Service:     CYSTOSCOPY TRANSURETHRAL RESECTION OF PROSTATE N/A 8/30/2021    Procedure: CYSTOSCOPY TRANSURETHRAL RESECTION OF PROSTATE;  Surgeon: Ric Tobin MD;  Location:  [facility] OR;  Service: Urology;  Laterality: N/A;    MANDIBLE SURGERY      PROSTATE BIOPSY      PROSTATE BIOPSY N/A 3/19/2020    Procedure: PROSTATE ULTRASOUND BIOPSY MRI FUSION WITH URONAV;  Surgeon: Ric Tobin MD;  Location:  [facility] OR;  Service: Urology;  Laterality: N/A;       Social History     Socioeconomic History    Marital status:    Tobacco Use    Smoking status: Never    Smokeless tobacco: Never   Vaping Use    Vaping status: Never Used   Substance and Sexual Activity    Alcohol use: No    Drug use: No    Sexual activity: Not Currently     Partners: Female       Family History   Problem Relation Age of Onset    Heart disease Father     No Known Problems Mother     No Known Problems Brother     No Known Problems Brother     No Known Problems Sister     No Known Problems Sister     No Known Problems Sister     No Known Problems Sister     Stomach cancer Sister     Cancer Sister 69    Colon cancer Neg Hx     Colon polyps Neg Hx        Objective    Temp 97.6 °F (36.4 °C)   Ht 180.3 cm (71\")   Wt 85.3 kg (188 lb)   BMI 26.22 kg/m²     Physical Exam  Vitals reviewed.   Constitutional:       Appearance: Normal appearance.   HENT:      Head: Normocephalic and atraumatic.   Pulmonary:      Effort: Pulmonary effort is normal.   Skin:     Coloration: Skin is not pale.   Neurological:      Mental Status: He is alert.   Psychiatric:         Mood and Affect: Mood normal.         Behavior: Behavior normal.             Results for orders placed or performed in visit on 05/22/24   POC Urinalysis Dipstick, Multipro    Specimen: Urine   Result Value " Ref Range    Color Yellow Yellow, Straw, Dark Yellow, Clarissa    Clarity, UA Clear Clear    Glucose, UA Negative Negative mg/dL    Bilirubin Negative Negative    Ketones, UA Negative Negative    Specific Gravity  1.025 1.005 - 1.030    Blood, UA Trace (A) Negative    pH, Urine 5.5 5.0 - 8.0    Protein, POC Negative Negative mg/dL    Urobilinogen, UA 1 E.U./dL (A) Normal, 0.2 E.U./dL    Nitrite, UA Negative Negative    Leukocytes Negative Negative     Assessment and Plan    Diagnoses and all orders for this visit:    1. Elevated prostate specific antigen (PSA) (Primary)  -     POC Urinalysis Dipstick, Multipro  -     PSA Diagnostic; Future  -     PSA DIAGNOSTIC; Future  -     PSA DIAGNOSTIC; Future    2. BPH with urinary obstruction    Overall patient has no new or worsening voiding complaints since TURP has had excellent symptoms.  His PSA is stable from previous.  Rectal exam was benign.    Follow-up 1 year PSA prior

## 2024-05-15 ENCOUNTER — LAB (OUTPATIENT)
Dept: LAB | Facility: HOSPITAL | Age: 74
End: 2024-05-15
Payer: MEDICARE

## 2024-05-15 DIAGNOSIS — R97.20 ELEVATED PROSTATE SPECIFIC ANTIGEN (PSA): ICD-10-CM

## 2024-05-15 LAB — PSA SERPL-MCNC: 0.87 NG/ML (ref 0–4)

## 2024-05-15 PROCEDURE — 36415 COLL VENOUS BLD VENIPUNCTURE: CPT

## 2024-05-15 PROCEDURE — 84153 ASSAY OF PSA TOTAL: CPT

## 2024-05-22 ENCOUNTER — OFFICE VISIT (OUTPATIENT)
Dept: UROLOGY | Facility: CLINIC | Age: 74
End: 2024-05-22
Payer: MEDICARE

## 2024-05-22 VITALS — TEMPERATURE: 97.6 F | HEIGHT: 71 IN | WEIGHT: 188 LBS | BODY MASS INDEX: 26.32 KG/M2

## 2024-05-22 DIAGNOSIS — N13.8 BPH WITH URINARY OBSTRUCTION: ICD-10-CM

## 2024-05-22 DIAGNOSIS — N40.1 BPH WITH URINARY OBSTRUCTION: ICD-10-CM

## 2024-05-22 DIAGNOSIS — R97.20 ELEVATED PROSTATE SPECIFIC ANTIGEN (PSA): Primary | ICD-10-CM

## 2024-05-22 LAB
BILIRUB BLD-MCNC: NEGATIVE MG/DL
CLARITY, POC: CLEAR
COLOR UR: YELLOW
GLUCOSE UR STRIP-MCNC: NEGATIVE MG/DL
KETONES UR QL: NEGATIVE
LEUKOCYTE EST, POC: NEGATIVE
NITRITE UR-MCNC: NEGATIVE MG/ML
PH UR: 5.5 [PH] (ref 5–8)
PROT UR STRIP-MCNC: NEGATIVE MG/DL
RBC # UR STRIP: ABNORMAL /UL
SP GR UR: 1.02 (ref 1–1.03)
UROBILINOGEN UR QL: ABNORMAL

## 2024-08-05 RX ORDER — ROSUVASTATIN CALCIUM 5 MG/1
5 TABLET, COATED ORAL DAILY
Qty: 90 TABLET | Refills: 3 | Status: SHIPPED | OUTPATIENT
Start: 2024-08-05

## 2024-08-13 DIAGNOSIS — I10 BENIGN HYPERTENSION: ICD-10-CM

## 2024-08-13 RX ORDER — AMLODIPINE BESYLATE 5 MG/1
5 TABLET ORAL DAILY
Qty: 90 TABLET | Refills: 3 | Status: SHIPPED | OUTPATIENT
Start: 2024-08-13

## 2024-10-01 ENCOUNTER — TELEPHONE (OUTPATIENT)
Dept: INTERNAL MEDICINE | Facility: CLINIC | Age: 74
End: 2024-10-01

## 2024-10-01 NOTE — TELEPHONE ENCOUNTER
Caller: Geoff Edwards    Relationship: Self    Best call back number: [phone] (Mobile)     What orders are you requesting (i.e. lab or imaging): SUBSEQUENT MEDICARE WELLNESS LABS    In what timeframe would the patient need to come in:  THE PATIENT STATES THAT HE WOULD LIKE TO COME IN ON 10/7/24 AT 8 AM.    Where will you receive your lab/imaging services:  [facility]    Additional notes: THE PATIENT IS REQUESTING A CALLBACK.

## 2024-10-14 ENCOUNTER — OFFICE VISIT (OUTPATIENT)
Dept: INTERNAL MEDICINE | Facility: CLINIC | Age: 74
End: 2024-10-14
Payer: MEDICARE

## 2024-10-14 VITALS
OXYGEN SATURATION: 98 % | HEIGHT: 71 IN | DIASTOLIC BLOOD PRESSURE: 60 MMHG | HEART RATE: 53 BPM | TEMPERATURE: 97.6 F | WEIGHT: 185 LBS | BODY MASS INDEX: 25.9 KG/M2 | SYSTOLIC BLOOD PRESSURE: 104 MMHG

## 2024-10-14 DIAGNOSIS — I10 ESSENTIAL HYPERTENSION: ICD-10-CM

## 2024-10-14 DIAGNOSIS — E78.5 HYPERLIPIDEMIA LDL GOAL <70: Chronic | ICD-10-CM

## 2024-10-14 DIAGNOSIS — I25.10 NON-OCCLUSIVE CORONARY ARTERY DISEASE: ICD-10-CM

## 2024-10-14 DIAGNOSIS — G47.33 OSA ON CPAP: Primary | ICD-10-CM

## 2024-10-14 DIAGNOSIS — E11.9 DIET-CONTROLLED DIABETES MELLITUS: Chronic | ICD-10-CM

## 2024-10-14 PROBLEM — E66.3 OVERWEIGHT (BMI 25.0-29.9): Status: RESOLVED | Noted: 2023-11-06 | Resolved: 2024-10-14

## 2024-10-14 PROCEDURE — 1159F MED LIST DOCD IN RCRD: CPT | Performed by: INTERNAL MEDICINE

## 2024-10-14 PROCEDURE — 1126F AMNT PAIN NOTED NONE PRSNT: CPT | Performed by: INTERNAL MEDICINE

## 2024-10-14 PROCEDURE — 1160F RVW MEDS BY RX/DR IN RCRD: CPT | Performed by: INTERNAL MEDICINE

## 2024-10-14 PROCEDURE — 3074F SYST BP LT 130 MM HG: CPT | Performed by: INTERNAL MEDICINE

## 2024-10-14 PROCEDURE — 1170F FXNL STATUS ASSESSED: CPT | Performed by: INTERNAL MEDICINE

## 2024-10-14 PROCEDURE — 3078F DIAST BP <80 MM HG: CPT | Performed by: INTERNAL MEDICINE

## 2024-10-14 PROCEDURE — G0439 PPPS, SUBSEQ VISIT: HCPCS | Performed by: INTERNAL MEDICINE

## 2024-10-14 NOTE — PROGRESS NOTES
Subjective   The ABCs of the Annual Wellness Visit  Medicare Wellness Visit      Geoff Edwards is a 74 y.o. patient who presents for a Medicare Wellness Visit.    The following portions of the patient's history were reviewed and   updated as appropriate: allergies, current medications, past family history, past medical history, past social history, past surgical history, and problem list.    Compared to one year ago, the patient's physical   health is the same.  Compared to one year ago, the patient's mental   health is the same.    Recent Hospitalizations:  He was not admitted to the hospital during the last year.     Current Medical Providers:  Patient Care Team:  Ochoa Hairston MD as PCP - General (Internal Medicine)  Ric Tobin MD as Consulting Physician (Urology)  Fernando Solano Jr., MD as Consulting Physician (Otolaryngology)  Luke Dixon MD as Cardiologist (Cardiology)  Erick Singer APRN as Nurse Practitioner (Cardiology)  [place] (Ophthalmology)    Outpatient Medications Prior to Visit   Medication Sig Dispense Refill    amLODIPine (NORVASC) 5 MG tablet TAKE 1 TABLET BY MOUTH DAILY. 90 tablet 3    aspirin (aspirin) 81 MG EC tablet Take 1 tablet by mouth Daily. 30 tablet 11    Contour Next Test test strip USE ONE EVERY  each 3    irbesartan (AVAPRO) 300 MG tablet TAKE 1 TABLET BY MOUTH ONCE DAILY 90 tablet 3    Lancets Ultra Thin misc USE ONE LANCET DAILY 100 each 3    rosuvastatin (CRESTOR) 5 MG tablet TAKE 1 TABLET BY MOUTH DAILY. 90 tablet 3     No facility-administered medications prior to visit.     No opioid medication identified on active medication list. I have reviewed chart for other potential  high risk medication/s and harmful drug interactions in the elderly.      Aspirin is on active medication list. Aspirin use is indicated based on review of current medical condition/s. Pros and cons of this therapy have been discussed today. Benefits of  "this medication outweigh potential harm.  Patient has been encouraged to continue taking this medication.  .      Patient Active Problem List   Diagnosis    Encounter for screening for malignant neoplasm of colon    Essential hypertension    Elevated prostate specific antigen (PSA)    Diet-controlled diabetes mellitus    Hip pain, chronic, right    Baker's cyst of knee, right    Non-occlusive coronary artery disease    Hyperlipidemia LDL goal <70    Urinary retention    Abnormal ECG Anterior infarct     RICARDO on CPAP    Paroxysmal SVT (supraventricular tachycardia)    Bilateral bunions    Pre-ulcerative corn or callous     Advance Care Planning Advance Directive is not on file.  ACP discussion was held with the patient during this visit. Patient does not have an advance directive, information provided.            Objective   Vitals:    10/14/24 0730   BP: 104/60   BP Location: Left arm   Patient Position: Sitting   Cuff Size: Adult   Pulse: 53   Temp: 97.6 °F (36.4 °C)   TempSrc: Temporal   SpO2: 98%   Weight: 83.9 kg (185 lb)   Height: 180.3 cm (71\")   PainSc: 0-No pain       Estimated body mass index is 25.8 kg/m² as calculated from the following:    Height as of this encounter: 180.3 cm (71\").    Weight as of this encounter: 83.9 kg (185 lb).            Does the patient have evidence of cognitive impairment? No  Lab Results   Component Value Date    CHLPL 117 10/07/2024    TRIG 46 10/07/2024    HDL 58 10/07/2024    LDL 48 10/07/2024    VLDL 11 10/07/2024    HGBA1C 6.00 (H) 10/07/2024                                                                                               Health  Risk Assessment    Smoking Status:  Social History     Tobacco Use   Smoking Status Never   Smokeless Tobacco Never     Alcohol Consumption:  Social History     Substance and Sexual Activity   Alcohol Use No       Fall Risk Screen  STEADI Fall Risk Assessment was completed, and patient is at LOW risk for falls.Assessment completed " on:10/14/2024    Depression Screening:      10/14/2024     7:29 AM   PHQ-2/PHQ-9 Depression Screening   Little interest or pleasure in doing things Not at all   Feeling down, depressed, or hopeless Not at all     Health Habits and Functional and Cognitive Screening:      10/14/2024     7:30 AM   Functional & Cognitive Status   Do you have difficulty preparing food and eating? No   Do you have difficulty bathing yourself, getting dressed or grooming yourself? No   Do you have difficulty using the toilet? No   Do you have difficulty moving around from place to place? No   Do you have trouble with steps or getting out of a bed or a chair? No   Current Diet Well Balanced Diet   Dental Exam Up to date   Eye Exam Up to date   Exercise (times per week) 3 times per week   Current Exercises Include Treadmill;Walking   Do you need help using the phone?  No   Are you deaf or do you have serious difficulty hearing?  Yes   Do you need help to go to places out of walking distance? No   Do you need help shopping? No   Do you need help preparing meals?  No   Do you need help with housework?  No   Do you need help with laundry? No   Do you need help taking your medications? No   Do you need help managing money? No   Do you ever drive or ride in a car without wearing a seat belt? No   Have you felt unusual stress, anger or loneliness in the last month? No   Who do you live with? Spouse   If you need help, do you have trouble finding someone available to you? No   Have you been bothered in the last four weeks by sexual problems? No   Do you have difficulty concentrating, remembering or making decisions? No           Age-appropriate Screening Schedule:  Refer to the list below for future screening recommendations based on patient's age, sex and/or medical conditions. Orders for these recommended tests are listed in the plan section. The patient has been provided with a written plan.    Health Maintenance List  Health Maintenance    Topic Date Due    TDAP/TD VACCINES (1 - Tdap) Never done    ZOSTER VACCINE (1 of 2) Never done    RSV Vaccine - Adults (1 - 1-dose 75+ series) Never done    DIABETIC EYE EXAM  01/03/2024    HEMOGLOBIN A1C  04/07/2025    BMI FOLLOWUP  04/10/2025    LIPID PANEL  10/07/2025    URINE MICROALBUMIN  10/07/2025    ANNUAL WELLNESS VISIT  10/14/2025    COLORECTAL CANCER SCREENING  11/21/2027    HEPATITIS C SCREENING  Completed    COVID-19 Vaccine  Completed    INFLUENZA VACCINE  Completed    Pneumococcal Vaccine 65+  Completed                                                                                                                                                 CMS Preventative Services Quick Reference  Risk Factors Identified During Encounter  Immunizations Discussed/Encouraged: Tdap, Shingrix, and RSV (Respiratory Syncytial Virus)  Dental Screening Recommended  Vision Screening Recommended    The above risks/problems have been discussed with the patient.  Pertinent information has been shared with the patient in the After Visit Summary.  An After Visit Summary and PPPS were made available to the patient.    Follow Up:   Next Medicare Wellness visit to be scheduled in 1 year.        Diagnoses and all orders for this visit:    1. RICARDO on CPAP (Primary)    2. Diet-controlled diabetes mellitus    3. Essential hypertension    4. Non-occlusive coronary artery disease    5. Hyperlipidemia LDL goal <70      Recommend at least annual dental and vision screening.  Recommend annual influenza vaccination  Recommend a varied diet and appropriate portion sizes.   CDC recommendations for physical activity:  At least 150 minutes a week (for example, 30 minutes a day, 5 days a week) of moderate-intensity activity such as brisk walking. Or can consider 75 minutes a week of vigorous-intensity activity such as hiking, jogging, or running.  At least 2 days a week of activities that strengthen muscles.  Plus activities to improve  balance.    Patient has borderline renal function and has had prior to me taking over his care.  However, his Cr has been extremely stable since 2020 with Cr typically right at 1.2-1.24 for the most part.      CPAP compliance reviewed and patient is compliance with use, reports improvement in apnea-related symptoms.  Continue CPAP therapy.    Health Maintenance Due   Topic Date Due    TDAP/TD VACCINES (1 - Tdap) Never done    ZOSTER VACCINE (1 of 2) Never done    RSV Vaccine - Adults (1 - 1-dose 75+ series) Never done    DIABETIC EYE EXAM  01/03/2024         Result Review :  The following data was reviewed by: Ochoa Hairston MD on 10/14/2024:  CMP          4/5/2024    07:16 10/7/2024    07:15   CMP   Glucose 109  113    BUN 15  15    Creatinine 1.24  1.24    Sodium 144  142    Potassium 4.2  4.2    Chloride 108  107    Calcium 9.0  9.2    Total Protein  6.7    Albumin  4.1    Globulin  2.6    Total Bilirubin  0.8    Alkaline Phosphatase  87    AST (SGOT)  20    ALT (SGPT)  20    BUN/Creatinine Ratio 12.1  12.1      CBC w/diff          10/7/2024    07:15   CBC w/Diff   WBC 6.52    RBC 4.20    Hemoglobin 13.1    Hematocrit 40.0    MCV 95.2    MCH 31.2    MCHC 32.8    RDW 12.9    Platelets 169    Neutrophil Rel % 51.8    Lymphocyte Rel % 33.3    Monocyte Rel % 8.6    Eosinophil Rel % 4.6    Basophil Rel % 1.4      Lipid Panel          4/5/2024    07:16 10/7/2024    07:15   Lipid Panel   Total Cholesterol 128  117    Triglycerides 43  46    HDL Cholesterol 56  58    VLDL Cholesterol 10  11    LDL Cholesterol  62  48      TSH          10/7/2024    07:15   TSH   TSH 4.520      A1C Last 3 Results          11/6/2023    08:25 4/5/2024    07:16 10/7/2024    07:15   HGBA1C Last 3 Results   Hemoglobin A1C 6.0  6.40  6.00      Microalbumin          10/7/2024    07:15   Microalbumin   Microalbumin, Urine 4.9      UA          5/22/2024    14:50 10/7/2024    07:15   Urinalysis   Ketones, UA Negative     Blood, UA  Negative     Leukocytes, UA Negative  Negative    Nitrite, UA  Negative    RBC, UA  0-2    Bacteria, UA  Comment           Follow Up:   Return in about 6 months (around 4/14/2025).     An After Visit Summary and PPPS were made available to the patient.                   CYNTHIA Hairston MD, FACP, [facility]      Electronically signed by Ochoa Hairston MD, 10/14/24, 7:43 AM CDT.

## 2025-01-21 RX ORDER — IRBESARTAN 300 MG/1
300 TABLET ORAL DAILY
Qty: 90 TABLET | Refills: 3 | Status: SHIPPED | OUTPATIENT
Start: 2025-01-21

## 2025-01-28 ENCOUNTER — TELEPHONE (OUTPATIENT)
Dept: CARDIOLOGY | Facility: CLINIC | Age: 75
End: 2025-01-28
Payer: MEDICARE

## 2025-01-28 NOTE — TELEPHONE ENCOUNTER
Caller: ETHAN MARTE WITH [employer]    Relationship: PROVIDER    Best call back number: [phone]    What form or medical record are you requestinST DATE OF DIAGNOSIS OF HYPERTENSION,  ALL TESTS AND NOTES PERTAINING TO HEART ISSUES   ICD 10 CODES AND BLOODWORK    Who is requesting this form or medical record from you: [employer] WITH [employer] OF [employer]    How would you like to receive the form or medical records (pick-up, mail, fax): FAX  If fax, what is the fax number: [phone]  If mail, what is the address:   If pick-up, provide patient with address and location details    Timeframe paperwork needed: ASAP    Additional notes:  PATIENT FILLED OUT A CHARIS ON 25    PLEASE REACH OUT TO HER AND LET HER KNOW WHEN TO EXPECT THESE PLEASE SO SHE CAN TELL THE DEPT OF LABOR

## 2025-01-29 NOTE — TELEPHONE ENCOUNTER
OKAY FOR [facility] TO RELAY    SUSAN FITZGERALD HAS RECORDS GENERATED, BUT UNABLE TO FAX. REQUEST IS WELL OVER 50 PAGES - PT OR  NEEDS TO  IN OFFICE OR VERIFY ADDRESS THAT RECORDS CAN BE MAILED TO.    CALL PLACED TO PT - NO ANSWER (RELEASE DOES NOT ALLOW VM FOR ANYTHING OTHER THAN APPT INFO)    CALL PLACED TO ETHAN MARTE - NO ANSWER - VM LEFT ASKING THAT SHE RETURN CALL TO ADVISE ON WHAT TO DO WITH RECORDS    OKAY FOR [facility] TO RELAY

## 2025-04-02 ENCOUNTER — OFFICE VISIT (OUTPATIENT)
Dept: CARDIOLOGY | Facility: CLINIC | Age: 75
End: 2025-04-02
Payer: MEDICARE

## 2025-04-02 VITALS
DIASTOLIC BLOOD PRESSURE: 82 MMHG | BODY MASS INDEX: 26.74 KG/M2 | HEART RATE: 57 BPM | SYSTOLIC BLOOD PRESSURE: 152 MMHG | HEIGHT: 71 IN | WEIGHT: 191 LBS

## 2025-04-02 DIAGNOSIS — R94.31 ABNORMAL ECG: ICD-10-CM

## 2025-04-02 DIAGNOSIS — I10 ESSENTIAL HYPERTENSION: ICD-10-CM

## 2025-04-02 DIAGNOSIS — E11.9 DIET-CONTROLLED DIABETES MELLITUS: Chronic | ICD-10-CM

## 2025-04-02 DIAGNOSIS — E78.5 HYPERLIPIDEMIA LDL GOAL <70: Chronic | ICD-10-CM

## 2025-04-02 DIAGNOSIS — I25.10 NON-OCCLUSIVE CORONARY ARTERY DISEASE: ICD-10-CM

## 2025-04-02 DIAGNOSIS — I47.10 PAROXYSMAL SVT (SUPRAVENTRICULAR TACHYCARDIA): Primary | ICD-10-CM

## 2025-04-02 DIAGNOSIS — G47.33 OSA ON CPAP: ICD-10-CM

## 2025-04-02 RX ORDER — AMLODIPINE BESYLATE 10 MG/1
10 TABLET ORAL DAILY
Qty: 90 TABLET | Refills: 3 | Status: SHIPPED | OUTPATIENT
Start: 2025-04-02

## 2025-04-02 NOTE — PROGRESS NOTES
Geoff Edwards  [id number]  1950  74 y.o.  male    Referring Provider: Ochoa Hairston MD    Reason for  Visit:Here for routine follow up     Subjective    Overall feeling well   No chest pain or shortness of breath     No palpitations  No significant pedal edema    Compliant with medications and dietary advice  Good effort tolerance    No presyncope or syncope  Compliant with medications    Tolerating current medications well with no untoward side effects   Compliant with prescribed medication regimen. Tries to adhere to cardiac diet.      Excellent effort tolerance with no cardiovascular limitations and at the patient's baseline   BP elevated at home  BP in clinic as below      No bleeding, excessive bruising, gait instability or fall risks      History of present illness:  Geoff Edwards is a 74 y.o. yo male with essential hypertension  who presents today for   Chief Complaint   Patient presents with    Non-occlusive coronary artery disease     1 YEAR FOLLOW UP    .    History  Past Medical History:   Diagnosis Date    Abnormal PSA     Chest pain     Diabetes mellitus     diet controlled    Elevated cholesterol     Hypertension     PONV (postoperative nausea and vomiting)     SEVERE    Seasonal allergies     Sleep apnea    ,   Past Surgical History:   Procedure Laterality Date    APPENDECTOMY      CARDIAC CATHETERIZATION      CARDIAC CATHETERIZATION Left 8/5/2021    Procedure: Cardiac Catheterization/Vascular Study;  Surgeon: Luke Dixon MD;  Location: [facility] CATH INVASIVE LOCATION;  Service: Cardiology;  Laterality: Left;    COLONOSCOPY  11/16/2012    Normal. Repeat in 5 years. Dr. Tez Perdomo    COLONOSCOPY N/A 11/21/2017    Procedure: COLONOSCOPY WITH ANESTHESIA;  Surgeon: Tez Perdomo MD;  Location: [facility] ENDOSCOPY;  Service:     CYSTOSCOPY TRANSURETHRAL RESECTION OF PROSTATE N/A 8/30/2021    Procedure: CYSTOSCOPY TRANSURETHRAL RESECTION OF PROSTATE;  Surgeon: Ric Tobin  MD Gm;  Location:  [facility] OR;  Service: Urology;  Laterality: N/A;    MANDIBLE SURGERY      PROSTATE BIOPSY      PROSTATE BIOPSY N/A 3/19/2020    Procedure: PROSTATE ULTRASOUND BIOPSY MRI FUSION WITH URONAV;  Surgeon: Ric Tobin MD;  Location:  [facility] OR;  Service: Urology;  Laterality: N/A;   ,   Family History   Problem Relation Age of Onset    Heart disease Father     No Known Problems Mother     No Known Problems Brother     No Known Problems Brother     No Known Problems Sister     No Known Problems Sister     No Known Problems Sister     No Known Problems Sister     Stomach cancer Sister     Cancer Sister 69    Colon cancer Neg Hx     Colon polyps Neg Hx    ,   Social History     Tobacco Use    Smoking status: Never    Smokeless tobacco: Never   Vaping Use    Vaping status: Never Used   Substance Use Topics    Alcohol use: No    Drug use: No   ,     Medications  Current Outpatient Medications   Medication Sig Dispense Refill    aspirin (aspirin) 81 MG EC tablet Take 1 tablet by mouth Daily. 30 tablet 11    Contour Next Test test strip USE ONE EVERY  each 3    irbesartan (AVAPRO) 300 MG tablet TAKE 1 TABLET BY MOUTH ONCE DAILY 90 tablet 3    Lancets Ultra Thin misc USE ONE LANCET DAILY 100 each 3    rosuvastatin (CRESTOR) 5 MG tablet TAKE 1 TABLET BY MOUTH DAILY. 90 tablet 3    amLODIPine (NORVASC) 10 MG tablet Take 1 tablet by mouth Daily. 90 tablet 3     No current facility-administered medications for this visit.       Allergies:  Ace inhibitors    Review of Systems  Review of Systems   Constitutional: Negative.   HENT: Negative.     Eyes: Negative.    Cardiovascular:  Negative for chest pain, claudication, cyanosis, dyspnea on exertion, irregular heartbeat, leg swelling, near-syncope, orthopnea, palpitations, paroxysmal nocturnal dyspnea and syncope.   Respiratory: Negative.     Endocrine: Negative.    Hematologic/Lymphatic: Negative.    Skin: Negative.    Gastrointestinal:  Negative  "for anorexia.   Genitourinary: Negative.    Neurological: Negative.    Psychiatric/Behavioral: Negative.         Objective     Physical Exam:  /82   Pulse 57   Ht 180.3 cm (71\")   Wt 86.6 kg (191 lb)   BMI 26.64 kg/m²     Physical Exam  Constitutional:       Appearance: He is well-developed.   HENT:      Head: Normocephalic.   Neck:      Vascular: Normal carotid pulses. No carotid bruit or JVD.      Trachea: No tracheal tenderness or tracheal deviation.   Cardiovascular:      Rate and Rhythm: Regular rhythm.      Pulses: Normal pulses.      Heart sounds: Normal heart sounds.   Pulmonary:      Effort: Pulmonary effort is normal.      Breath sounds: No stridor.   Abdominal:      Palpations: Abdomen is soft.   Musculoskeletal:      Cervical back: No edema.   Skin:     General: Skin is warm.   Neurological:      Mental Status: He is alert.      Cranial Nerves: No cranial nerve deficit.      Sensory: No sensory deficit.   Psychiatric:         Speech: Speech normal.         Behavior: Behavior normal.         Results Review:    Results for orders placed during the hospital encounter of 03/02/22    Adult Transthoracic Echo Complete w/ Color, Spectral and Contrast if necessary per protocol    Interpretation Summary  · Left ventricular wall thickness is consistent with mild concentric hypertrophy.  · Left ventricular ejection fraction appears to be 61 - 65%. Left ventricular systolic function is normal.  · Abnormal global longitudinal LV strain (GLS) = -15.0%  · Left ventricular diastolic function was normal.  · Estimated right ventricular systolic pressure from tricuspid regurgitation is normal (<35 mmHg).        ____________________________________________________________________________________________________________________________________________  Health maintenance and recommendations    Low salt/ HTN/ Heart healthy carbohydrate restricted cardiac diet   The patient is advised to reduce or avoid caffeine or " other cardiac stimulants.   Minimize or avoid  NSAID-type medications      Monitor for any signs of bleeding including red or dark stools. Fall precautions.   Advised staying uptodate with immunizations per established standard guidelines.    Offered to give patient  a copy of my notes     Questions were encouraged, asked and answered to the patient's  understanding and satisfaction. Questions if any regarding current medications and side effects, need for refills and importance of compliance to medications stressed.    Reviewed available prior notes, consults, prior visits, laboratory findings, radiology and cardiology relevant reports. Updated chart as applicable. I have reviewed the patient's medical history in detail and updated the computerized patient record as relevant.      Updated patient regarding any new or relevant abnormalities on review of records or any new findings on physical exam. Mentioned to patient about purpose of visit and desirable health short and long term goals and objectives.    Primary to monitor CBC CMP Lipid panel and TSH as applicable    ___________________________________________________________________________________________________________________________________________     ECG 12 Lead    Date/Time: 4/2/2025 9:24 AM  Performed by: Luke Dixon MD    Authorized by: Luke Dixon MD  Comparison: compared with previous ECG from 4/1/2024  Comparison to previous ECG: Ventricular rate changed from 57  to 52  beats per minute      Rhythm: sinus bradycardia  Rate: bradycardic  QRS axis: normal  Other findings: non-specific ST-T wave changes    Clinical impression: abnormal EKG          Assessment & Plan   Diagnoses and all orders for this visit:    1. Paroxysmal SVT (supraventricular tachycardia) (Primary)    2. Non-occlusive coronary artery disease    3. Hyperlipidemia LDL goal <70    4. Essential hypertension    5. Abnormal ECG Anterior infarct     6. RICRADO on CPAP    7. Diet-controlled  diabetes mellitus    Other orders  -     ECG 12 Lead  -     amLODIPine (NORVASC) 10 MG tablet; Take 1 tablet by mouth Daily.  Dispense: 90 tablet; Refill: 3          Plan    Patient expressed understanding  Encouraged and answered all questions   Discussed with the patient and all questioned fully answered. He will call me if any problems arise.   Discussed results of prior testing with patient : echo   as well as electrocardiogram available for review     Needs better BP control    Requested Prescriptions     Signed Prescriptions Disp Refills    amLODIPine (NORVASC) 10 MG tablet 90 tablet 3     Sig: Take 1 tablet by mouth Daily.        Check BP and heart rates twice daily initially till blood pressures and heart rates under good control and then at least 3x / week,   If blood pressures continue to be well-controlled then can check week a month  at home and bring a recording for review next visit  If BP >130/85 or < 100/60 persistently over 3 reading 30 mins apart or if heart rates persistently above 100 bpm or less than 55 bpm call sooner for evaluation and advise     Keep A1c less than 7 Primary to monitor  Keep LDL below 70 mg/dl. Monitor liver and renal functions.   Monitor CBC, CMP, TSH (as indicated) and Lipid Panel by primary    LDL as above     Continue NIPPV daily.           Return in about 1 year (around 4/2/2026).

## 2025-04-14 ENCOUNTER — HOSPITAL ENCOUNTER (OUTPATIENT)
Dept: GENERAL RADIOLOGY | Facility: HOSPITAL | Age: 75
Discharge: HOME | End: 2025-04-14
Admitting: INTERNAL MEDICINE
Payer: MEDICARE

## 2025-04-14 ENCOUNTER — OFFICE VISIT (OUTPATIENT)
Dept: INTERNAL MEDICINE | Facility: CLINIC | Age: 75
End: 2025-04-14
Payer: MEDICARE

## 2025-04-14 VITALS
TEMPERATURE: 97.2 F | BODY MASS INDEX: 26.46 KG/M2 | SYSTOLIC BLOOD PRESSURE: 116 MMHG | HEIGHT: 71 IN | WEIGHT: 189 LBS | HEART RATE: 64 BPM | OXYGEN SATURATION: 98 % | DIASTOLIC BLOOD PRESSURE: 60 MMHG

## 2025-04-14 DIAGNOSIS — M25.552 PAIN OF LEFT HIP: ICD-10-CM

## 2025-04-14 DIAGNOSIS — M54.9 ACUTE LEFT-SIDED BACK PAIN, UNSPECIFIED BACK LOCATION: ICD-10-CM

## 2025-04-14 DIAGNOSIS — L84 PRE-ULCERATIVE CORN OR CALLOUS: ICD-10-CM

## 2025-04-14 DIAGNOSIS — I25.10 NON-OCCLUSIVE CORONARY ARTERY DISEASE: ICD-10-CM

## 2025-04-14 DIAGNOSIS — M21.611 BILATERAL BUNIONS: ICD-10-CM

## 2025-04-14 DIAGNOSIS — M21.612 BILATERAL BUNIONS: ICD-10-CM

## 2025-04-14 DIAGNOSIS — E78.5 HYPERLIPIDEMIA LDL GOAL <70: Chronic | ICD-10-CM

## 2025-04-14 DIAGNOSIS — E11.8 DIABETIC FOOT: ICD-10-CM

## 2025-04-14 DIAGNOSIS — E11.9 DIET-CONTROLLED DIABETES MELLITUS: Primary | Chronic | ICD-10-CM

## 2025-04-14 DIAGNOSIS — I10 ESSENTIAL HYPERTENSION: ICD-10-CM

## 2025-04-14 DIAGNOSIS — M21.619 BUNION: ICD-10-CM

## 2025-04-14 PROCEDURE — 72100 X-RAY EXAM L-S SPINE 2/3 VWS: CPT

## 2025-04-14 PROCEDURE — 73502 X-RAY EXAM HIP UNI 2-3 VIEWS: CPT

## 2025-04-14 NOTE — PROGRESS NOTES
"      Chief Complaint  Hypertension, Diabetes (A1c: 5.9/Needs order for diabetic shoes/[facility]), Hip Pain (Left hip For the last two weeks- give out on occasion- wonders about an x ray ), and Foot Pain (Right foot pain- wonders about seeing podiatry )    Subjective        Geoff Edwards presents to [facility]    HPI    Patient here for the above problems.  See Assessment and Plan for further HPI components.      Review of Systems    Objective   Vital Signs:  /60 (BP Location: Left arm, Patient Position: Sitting, Cuff Size: Adult)   Pulse 64   Temp 97.2 °F (36.2 °C) (Temporal)   Ht 180.3 cm (71\")   Wt 85.7 kg (189 lb)   SpO2 98%   BMI 26.36 kg/m²   Estimated body mass index is 26.36 kg/m² as calculated from the following:    Height as of this encounter: 180.3 cm (71\").    Weight as of this encounter: 85.7 kg (189 lb).      Physical Exam  Vitals and nursing note reviewed.   Constitutional:       Appearance: He is not ill-appearing.   Eyes:      General: No scleral icterus.     Conjunctiva/sclera: Conjunctivae normal.   Pulmonary:      Effort: Pulmonary effort is normal. No respiratory distress.   Musculoskeletal:      Right foot: Deformity and bunion present.      Left foot: Deformity and bunion present.        Feet:    Feet:      Right foot:      Skin integrity: Dry skin present.      Toenail Condition: Right toenails are abnormally thick.      Left foot:      Skin integrity: Dry skin present.      Toenail Condition: Left toenails are abnormally thick.      Comments: Flat feet, loss of arch  Thickened nails  Preulcerative callous  Bunion formation  Mild hammer toe formation on 2nd digit bilaterally  Diabetic Foot Exam Performed      Neurological:      General: No focal deficit present.      Mental Status: He is alert and oriented to person, place, and time.   Psychiatric:         Mood and Affect: Mood normal.         Behavior: Behavior normal.      "                  Assessment and Plan   Diagnoses and all orders for this visit:    1. Diet-controlled diabetes mellitus (Primary)  -     Hemoglobin A1c; Future  -     Microalbumin / Creatinine Urine Ratio - Urine, Clean Catch; Future  -     Urinalysis With Microscopic - Urine, Clean Catch; Future    2. Pain of left hip  -     XR Hip With or Without Pelvis 2 - 3 View Left; Future    3. Acute left-sided back pain, unspecified back location  -     XR Spine Lumbar 2 or 3 View; Future    4. Diabetic foot  -     Ambulatory Referral to Podiatry  -     Footwear Diabetic    5. Pre-ulcerative corn or callous  -     Footwear Diabetic    6. Bunion  -     Footwear Diabetic    7. Essential hypertension  -     Comprehensive Metabolic Panel; Future  -     CBC & Differential; Future  -     Urinalysis With Microscopic - Urine, Clean Catch; Future  -     TSH Rfx On Abnormal To Free T4; Future    8. Bilateral bunions    9. Hyperlipidemia LDL goal <70  -     Comprehensive Metabolic Panel; Future  -     Urinalysis With Microscopic - Urine, Clean Catch; Future  -     Lipid Panel; Future    10. Non-occlusive coronary artery disease  -     Lipid Panel; Future        History of Present Illness  The patient is a 74-year-old gentleman who presents today for a Medicare wellness visit.    He reports intermittent pain in his right foot, which is exacerbated by walking. He has expressed interest in consulting with a podiatrist.    Additionally, he experiences pain in his left hip, which began a few weeks ago and is particularly noticeable after prolonged periods of walking. The pain does not radiate beyond the hip and is not associated with any knee discomfort.    He also reports swelling in his right hand, which is his dominant hand.      Assessment & Plan  1. Right foot pain.  The patient's flatfoot condition is likely contributing to his arthritis and associated pain. He was advised to monitor the development of calluses, as they could  potentially lead to ulcers if subjected to pressure from footwear. A referral to Dr. Chery, a podiatrist, will be made for further evaluation and management of his diabetic foot care. Additionally, an order for diabetic shoes will be sent to [facility].  Patient has above findings on his feet.  Patient would benefit from diabetic shoes.    2. Left hip pain.  The patient's left hip pain could be attributed to bursitis or enthesopathy.  He was advised to apply Voltaren gel to the affected area. X-rays of the lumbar spine and left hip will be ordered to further investigate the cause of the pain. Consider PT vs. Referall to sports medicine.    3. Arthritis in hand  The patient's right hand swelling is likely due to arthritis, particularly affecting the two digits. He was recommended to use over-the-counter compression gloves at night to alleviate the swelling. Voltaren gel can also be applied to the affected area before wearing the gloves.    4. Patient has diabetes.  Most recent A1c is 6.0.    Based on patient's last A1c they are currently at goal.. The patient's A1c goal is < 7.0%  Recommend routine monitoring of blood sugar. Avoid hypoglycemia. Recommend ADA diet and avoidance of excess carbohydrates. Patient is currently on  lifestyle modification only .  Recommend that we continue current regimen.  .  Recommend at least annual eye examination.  Recommend at least annual diabetic foot examination.  Recommend checking self checks of feet routinely.  Patient has the following diabetic complications:  none .    Continue to monitor.  Patient's last diabetic nephropathy evaluation was:  Creatinine, Urine (mg/dL)   Date/Time Value   10/07/2024 0715 162.9     Microalbumin, Urine (ug/mL)   Date/Time Value   10/07/2024 0715 4.9     Protein (no units)   Date/Time Value   10/07/2024 0715 Negative     Protein, POC (mg/dL)   Date/Time Value   05/22/2024 1450 Negative     A1C Last 3 Results          10/7/2024    07:15   HGBA1C  Last 3 Results   Hemoglobin A1C 6.00           Patient has hypertension.  BP is at goal.  The patient's BP goal is < 130/80.  Recommend ambulatory BP monitoring after patient has taken medication.  Recommend varying the times of the blood pressure readings.  Patient is currently on amlodipine 10 mg, irbesartan 300 mg.  Recommend we continue current regimen.    Continue to monitor.        Result Review :                             Follow Up   Return in about 6 months (around 10/14/2025), or if symptoms worsen or fail to improve, for follow up for above problems. Longitudinal care., Medicare Wellness - Labs prior to visit.  Patient was given instructions and counseling regarding his condition or for health maintenance advice. Please see specific information pulled into the AVS if appropriate.       CYNTHIA Hairston MD, FACP, [facility]      Electronically signed by Ochoa Hairston MD, 04/14/25, 12:49 PM CDT.    Patient or patient representative verbalized consent for the use of Ambient Listening during the visit with  Ochoa Hairston MD for chart documentation. 4/14/2025  12:53 CDT

## 2025-04-28 ENCOUNTER — OFFICE VISIT (OUTPATIENT)
Dept: NEUROLOGY | Age: 75
End: 2025-04-28
Payer: MEDICARE

## 2025-04-28 VITALS
RESPIRATION RATE: 16 BRPM | BODY MASS INDEX: 24.92 KG/M2 | HEART RATE: 57 BPM | OXYGEN SATURATION: 98 % | SYSTOLIC BLOOD PRESSURE: 138 MMHG | WEIGHT: 178 LBS | DIASTOLIC BLOOD PRESSURE: 88 MMHG | HEIGHT: 71 IN

## 2025-04-28 DIAGNOSIS — G47.33 SLEEP APNEA, OBSTRUCTIVE: Primary | ICD-10-CM

## 2025-04-28 PROCEDURE — G8420 CALC BMI NORM PARAMETERS: HCPCS | Performed by: PSYCHIATRY & NEUROLOGY

## 2025-04-28 PROCEDURE — 1123F ACP DISCUSS/DSCN MKR DOCD: CPT | Performed by: PSYCHIATRY & NEUROLOGY

## 2025-04-28 PROCEDURE — G8427 DOCREV CUR MEDS BY ELIG CLIN: HCPCS | Performed by: PSYCHIATRY & NEUROLOGY

## 2025-04-28 PROCEDURE — 4004F PT TOBACCO SCREEN RCVD TLK: CPT | Performed by: PSYCHIATRY & NEUROLOGY

## 2025-04-28 PROCEDURE — 1159F MED LIST DOCD IN RCRD: CPT | Performed by: PSYCHIATRY & NEUROLOGY

## 2025-04-28 PROCEDURE — 99213 OFFICE O/P EST LOW 20 MIN: CPT | Performed by: PSYCHIATRY & NEUROLOGY

## 2025-04-28 NOTE — PROGRESS NOTES
[facility]  [address], [address]  [place]  [address]  Phone [phone]  Fax [phone]     [facility] Follow Up Encounter  25 10:18 AM CDT    Information:   Patient Name: Vick Gold  :   1950  Age:   74 y.o.  MRN:   [id number]  Account #:  [id number]  Today:  25    Provider: Darci Le M.D.     Chief Complaint:   Chief Complaint   Patient presents with    Follow-up     Patient is here for follow up on SKYLAR       Subjective:   Vick Gold is a 74 y.o. man with obstructive sleep apnea who is following up.  He continues to use CPAP during sleep.  He rests well.  He has not had palpitations or known recurrent atrial fibrillation.        Objective:     Past Medical History:  Past Medical History:   Diagnosis Date    Hypertension     Sleep difficulties        Past Surgical History:   Procedure Laterality Date    APPENDECTOMY      KNEE ARTHROSCOPY      MANDIBLE FRACTURE SURGERY      PROSTATE SURGERY         Recent Hospitalizations  None    Significant Injuries  None    Habits  Vick Gold reports that he has never smoked. He has never used smokeless tobacco. He reports that he does not drink alcohol and does not use drugs.    No family history on file.    Social History  Vick Gold is , lives in [place], [place], and is retired.     Medications:  Current Outpatient Medications   Medication Sig Dispense Refill    amLODIPine (NORVASC) 5 MG tablet Take 1 tablet by mouth daily      aspirin 81 MG EC tablet Take 1 tablet by mouth daily      irbesartan (AVAPRO) 300 MG tablet Take 1 tablet by mouth daily      naproxen (NAPROSYN) 500 MG tablet TAKE ONE TABLET BY MOUTH TWO TIMES A DAY AS NEEDED FOR PAIN      rosuvastatin (CRESTOR) 5 MG tablet Take 1 tablet by mouth daily       No current facility-administered medications for this visit.       Allergies:  Allergies as of 2025 - Fully Reviewed 2025   Allergen Reaction Noted

## 2025-05-01 NOTE — PROGRESS NOTES
Subjective    Mr. Edwards is 74 y.o. male    Chief Complaint: Elevated PSA    History of Present Illness  Patient presents for annual follow-up with history of BPH and elevated PSA.  He does have history of TURP was done 08/30/2021.  His PSA was 20.6 August 2021.  After he had TURP his PSA decreased it is still low at 0.94.  Denies any new or worsening symptoms he is not on any urologic medications at this time.  His urine is clear.  Patient does not have a family history of prostate cancer  Lab Results   Component Value Date    PSA 0.940 05/19/2025    PSA 0.867 05/15/2024    PSA 0.767 05/16/2023         The following portions of the patient's history were reviewed and updated as appropriate: allergies, current medications, past family history, past medical history, past social history, past surgical history and problem list.    Review of Systems   Genitourinary: Negative.          Current Outpatient Medications:     amLODIPine (NORVASC) 10 MG tablet, Take 1 tablet by mouth Daily., Disp: 90 tablet, Rfl: 3    aspirin (aspirin) 81 MG EC tablet, Take 1 tablet by mouth Daily., Disp: 30 tablet, Rfl: 11    Contour Next Test test strip, USE ONE EVERY DAY, Disp: 100 each, Rfl: 3    diclofenac (CATAFLAM) 50 MG tablet, Take 1 tablet by mouth 2 (Two) Times a Day for 90 days., Disp: 60 tablet, Rfl: 2    irbesartan (AVAPRO) 300 MG tablet, TAKE 1 TABLET BY MOUTH ONCE DAILY, Disp: 90 tablet, Rfl: 3    Lancets Ultra Thin misc, USE ONE LANCET DAILY, Disp: 100 each, Rfl: 3    rosuvastatin (CRESTOR) 5 MG tablet, TAKE 1 TABLET BY MOUTH DAILY., Disp: 90 tablet, Rfl: 3    Past Medical History:   Diagnosis Date    Abnormal PSA     Chest pain     Diabetes mellitus     diet controlled    Elevated cholesterol     Hypertension     PONV (postoperative nausea and vomiting)     SEVERE    Seasonal allergies     Sleep apnea        Past Surgical History:   Procedure Laterality Date    APPENDECTOMY      CARDIAC CATHETERIZATION      CARDIAC  "CATHETERIZATION Left 8/5/2021    Procedure: Cardiac Catheterization/Vascular Study;  Surgeon: Luke Dixon MD;  Location: [facility] CATH INVASIVE LOCATION;  Service: Cardiology;  Laterality: Left;    COLONOSCOPY  11/16/2012    Normal. Repeat in 5 years. Dr. Tez Perdomo    COLONOSCOPY N/A 11/21/2017    Procedure: COLONOSCOPY WITH ANESTHESIA;  Surgeon: Tez Perdomo MD;  Location: [facility] ENDOSCOPY;  Service:     CYSTOSCOPY TRANSURETHRAL RESECTION OF PROSTATE N/A 8/30/2021    Procedure: CYSTOSCOPY TRANSURETHRAL RESECTION OF PROSTATE;  Surgeon: Ric Tobin MD;  Location: [facility] OR;  Service: Urology;  Laterality: N/A;    MANDIBLE SURGERY      PROSTATE BIOPSY      PROSTATE BIOPSY N/A 3/19/2020    Procedure: PROSTATE ULTRASOUND BIOPSY MRI FUSION WITH URONAV;  Surgeon: Ric Tobin MD;  Location:  [facility] OR;  Service: Urology;  Laterality: N/A;       Social History     Socioeconomic History    Marital status:    Tobacco Use    Smoking status: Never     Passive exposure: Never    Smokeless tobacco: Never   Vaping Use    Vaping status: Never Used   Substance and Sexual Activity    Alcohol use: No    Drug use: No    Sexual activity: Not Currently     Partners: Female       Family History   Problem Relation Age of Onset    Heart disease Father     No Known Problems Mother     No Known Problems Brother     No Known Problems Brother     No Known Problems Sister     No Known Problems Sister     No Known Problems Sister     No Known Problems Sister     Stomach cancer Sister     Cancer Sister 69    Colon cancer Neg Hx     Colon polyps Neg Hx        Objective    Temp 97.6 °F (36.4 °C)   Ht 180.3 cm (71\")   Wt 86.2 kg (190 lb)   BMI 26.50 kg/m²     Physical Exam  Vitals reviewed.   Constitutional:       Appearance: Normal appearance.   HENT:      Head: Normocephalic and atraumatic.   Pulmonary:      Effort: Pulmonary effort is normal.   Skin:     Coloration: Skin is not pale.   Neurological:      Mental " Status: He is alert.   Psychiatric:         Mood and Affect: Mood normal.         Behavior: Behavior normal.             Results for orders placed or performed in visit on 05/22/25   POC Urinalysis Dipstick, Multipro    Collection Time: 05/22/25  8:22 AM    Specimen: Urine   Result Value Ref Range    Color Yellow Yellow, Straw, Dark Yellow, Clarissa    Clarity, UA Clear Clear    Glucose, UA Negative Negative mg/dL    Bilirubin Negative Negative    Ketones, UA Negative Negative    Specific Gravity  1.025 1.005 - 1.030    Blood, UA Trace (A) Negative    pH, Urine 5.5 5.0 - 8.0    Protein, POC Trace (A) Negative mg/dL    Urobilinogen, UA Normal Normal, 0.2 E.U./dL    Nitrite, UA Negative Negative    Leukocytes Negative Negative     Assessment and Plan    Diagnoses and all orders for this visit:    1. Elevated prostate specific antigen (PSA) (Primary)  -     POC Urinalysis Dipstick, Multipro  -     PSA DIAGNOSTIC; Future    2. BPH with urinary obstruction      His PSA remains low at 0.94 digital rectal exam was benign.  Patient had a TURP that showed no prostate cancer in the prostate chips following surgery.  Symptoms are stable controlled he is on no urologic medications.     1 year PSA prior PSA remains low and unchanged or stable would recommend no further PSA testing per AUA guidelines on PSA screening

## 2025-05-06 ENCOUNTER — TELEPHONE (OUTPATIENT)
Dept: INTERNAL MEDICINE | Facility: CLINIC | Age: 75
End: 2025-05-06

## 2025-05-06 NOTE — TELEPHONE ENCOUNTER
Caller: Geoff Edwards    Relationship: Self    Best call back number: [phone]     What is the best time to reach you: ANYTIME     Who are you requesting to speak with (clinical staff, provider,  specific staff member): CLINICAL     Do you know the name of the person who called:     What was the call regarding: PATIENT IS REQUESTING A CALL BACK REGARDING A REFERRAL THAT HE HAD REQUESTED.     Is it okay if the provider responds through MyChart: PHONE CALL

## 2025-05-06 NOTE — TELEPHONE ENCOUNTER
Called pt - he was calling about his order for diabetic shoes.  Informed we faxed to [employer] on 4/14 and they sent us a form that was signed on 4/17.  He is going to call them today for an update and will let us know if any problems.

## 2025-05-07 ENCOUNTER — TELEPHONE (OUTPATIENT)
Age: 75
End: 2025-05-07
Payer: MEDICARE

## 2025-05-07 NOTE — PROGRESS NOTES
[facility] - PODIATRY    Today's Date: 05/09/2025     Patient Name: Geoff Edwards  MRN: [id number]  CSN: [id number]  PCP: Ochoa Hairston MD  Referring Provider: Ochoa Hairston MD    SUBJECTIVE     Chief Complaint   Patient presents with    Establish Care     Ochoa Hairston MD-04/15/2025-Diabetic foot -xray 05/08-pt states he is here today for flat feet/mamie foot pain in the mornings    Diabetes   \  HPI: Geoff Edwards, a 74 y.o.male, comes to clinic as a(n) new patient complaining of foot pain. Patient has h/o Prediabetic, HTN, Hypercholesterolemia .  Relates pain in both feet for several months. Denies injury or trauma. Notes that he has a bit more pain in the right compared to the left. Tends to put pressure on the outside of each foot.  Has had xrays performed. Admits pain at 3/10 level and described as aching and dull. Denies previous treatment. Denies any constitutional symptoms. No other pedal complaints at this time.    Past Medical History:   Diagnosis Date    Abnormal PSA     Chest pain     Diabetes mellitus     diet controlled    Elevated cholesterol     Hypertension     PONV (postoperative nausea and vomiting)     SEVERE    Seasonal allergies     Sleep apnea      Past Surgical History:   Procedure Laterality Date    APPENDECTOMY      CARDIAC CATHETERIZATION      CARDIAC CATHETERIZATION Left 8/5/2021    Procedure: Cardiac Catheterization/Vascular Study;  Surgeon: Luke Dixon MD;  Location: [facility] CATH INVASIVE LOCATION;  Service: Cardiology;  Laterality: Left;    COLONOSCOPY  11/16/2012    Normal. Repeat in 5 years. Dr. Tez Perdomo    COLONOSCOPY N/A 11/21/2017    Procedure: COLONOSCOPY WITH ANESTHESIA;  Surgeon: Tez Perdomo MD;  Location: [facility] ENDOSCOPY;  Service:     CYSTOSCOPY TRANSURETHRAL RESECTION OF PROSTATE N/A 8/30/2021    Procedure: CYSTOSCOPY TRANSURETHRAL RESECTION OF PROSTATE;  Surgeon: Ric Tobin MD;  Location: [facility]  OR;  Service: Urology;  Laterality: N/A;    MANDIBLE SURGERY      PROSTATE BIOPSY      PROSTATE BIOPSY N/A 3/19/2020    Procedure: PROSTATE ULTRASOUND BIOPSY MRI FUSION WITH URONAV;  Surgeon: Ric Tobin MD;  Location: [facility] OR;  Service: Urology;  Laterality: N/A;     Family History   Problem Relation Age of Onset    Heart disease Father     No Known Problems Mother     No Known Problems Brother     No Known Problems Brother     No Known Problems Sister     No Known Problems Sister     No Known Problems Sister     No Known Problems Sister     Stomach cancer Sister     Cancer Sister 69    Colon cancer Neg Hx     Colon polyps Neg Hx      Social History     Socioeconomic History    Marital status:    Tobacco Use    Smoking status: Never     Passive exposure: Never    Smokeless tobacco: Never   Vaping Use    Vaping status: Never Used   Substance and Sexual Activity    Alcohol use: No    Drug use: No    Sexual activity: Not Currently     Partners: Female     Allergies   Allergen Reactions    Ace Inhibitors Cough     Current Outpatient Medications   Medication Sig Dispense Refill    amLODIPine (NORVASC) 10 MG tablet Take 1 tablet by mouth Daily. 90 tablet 3    aspirin (aspirin) 81 MG EC tablet Take 1 tablet by mouth Daily. 30 tablet 11    Contour Next Test test strip USE ONE EVERY  each 3    irbesartan (AVAPRO) 300 MG tablet TAKE 1 TABLET BY MOUTH ONCE DAILY 90 tablet 3    Lancets Ultra Thin misc USE ONE LANCET DAILY 100 each 3    rosuvastatin (CRESTOR) 5 MG tablet TAKE 1 TABLET BY MOUTH DAILY. 90 tablet 3    diclofenac (CATAFLAM) 50 MG tablet Take 1 tablet by mouth 2 (Two) Times a Day for 90 days. 60 tablet 2     No current facility-administered medications for this visit.     Review of Systems   Constitutional:  Negative for chills and fever.   HENT:  Negative for congestion.    Respiratory:  Negative for shortness of breath.    Cardiovascular:  Negative for chest pain and leg swelling.    Gastrointestinal:  Negative for constipation, diarrhea, nausea and vomiting.   Musculoskeletal:  Positive for arthralgias.        Foot pain   Skin:  Negative for wound.   Neurological:  Negative for numbness.   Psychiatric/Behavioral:  Negative for agitation.        OBJECTIVE     Vitals:    05/09/25 0819   BP: 120/70   Pulse: 55   SpO2: 98%       PHYSICAL EXAM  GEN:   Accompanied by none.     Foot/Ankle Exam    GENERAL  Appearance:  appears stated age  Orientation:  AAOx3  Affect:  appropriate  Gait:  unimpaired  Assistance:  independent  Right shoe gear: casual shoe  Left shoe gear: casual shoe    VASCULAR     Right Foot Vascularity   Dorsalis pedis:  2+  Posterior tibial:  2+  Skin temperature:  warm  Edema grading:  None  CFT:  3  Pedal hair growth:  Present  Varicosities:  none     Left Foot Vascularity   Dorsalis pedis:  2+  Posterior tibial:  2+  Skin temperature:  warm  Edema grading:  None  CFT:  3  Pedal hair growth:  Present  Varicosities:  none     NEUROLOGIC     Right Foot Neurologic   Normal sensation    Light touch sensation: normal  Vibratory sensation: normal  Hot/Cold sensation: normal     Left Foot Neurologic   Normal sensation    Light touch sensation: normal  Vibratory sensation: normal  Hot/Cold sensation:  normal    MUSCULOSKELETAL     Right Foot Musculoskeletal   Ecchymosis:  none  Tenderness:  MTP 1 dorsal tenderness and fifth metatarsal base tenderness    Arch:  Pes planus  Hallux limitus: Yes       Left Foot Musculoskeletal   Ecchymosis:  none  Tenderness:  MTP 1 dorsal tenderness  Arch:  Pes planus  Hallux limitus: Yes      MUSCLE STRENGTH     Right Foot Muscle Strength   Foot dorsiflexion:  5  Foot plantar flexion:  5  Foot inversion:  5  Foot eversion:  5     Left Foot Muscle Strength   Foot dorsiflexion:  5  Foot plantar flexion:  5  Foot inversion:  5  Foot eversion:  5    RANGE OF MOTION     Right Foot Range of Motion   Foot and ankle ROM within normal limits    1st MTP extension:   with pain  1st MTP flexion:  with pain     Left Foot Range of Motion   Foot and ankle ROM within normal limits    1st MTP extension: decreased with pain  1st MTP flexion: decreased with pain    DERMATOLOGIC      Right Foot Dermatologic   Skin  Right foot skin is intact.      Left Foot Dermatologic   Skin  Left foot skin is intact.     Image:       RADIOLOGY/NUCLEAR:  XR Foot 3+ View Bilateral  Result Date: 5/8/2025  Narrative: XR FOOT 3+ VW BILATERAL- 5/8/2025 7:15 AM  HISTORY: mamie foot pain; M79.671-Pain in right foot; M79.672-Pain in left foot; M21.611-Bunion of right foot; M21.612-Bunion of left foot  COMPARISON: None available  FINDINGS: Frontal, lateral and oblique radiographs of the bilateral feet were provided for review.  Right foot: There is severe osteoarthritis at the first MTP joint. Mineralization is normal. No acute fracture is seen. There is mild osteoarthritis at the ankle joint at the distal tibia with osteophyte formation.      Impression: Osteoarthritis of the first MTP joint and ankle joint.  Left foot: There is osteoarthritis at the first MTP joint and mild hallux valgus deformity. No acute fracture is seen. There is mild degenerative change at the ankle joint and at the midfoot. Soft tissues are unremarkable.  IMPRESSION:  1. Degenerative changes and no acute osseous abnormality.  This report was signed and finalized on 5/8/2025 9:41 AM by Aurelio Chaudhry.      XR Hip With or Without Pelvis 2 - 3 View Left  Result Date: 4/14/2025  Narrative: EXAMINATION:  XR HIP W OR WO PELVIS 2-3 VIEW LEFT-  4/14/2025 7:16 AM  HISTORY: M25.552-Pain in left hip.  COMPARISON: No comparison study.  TECHNIQUE: AP and frogleg views were obtained.  FINDINGS: There is no evidence of acute fracture. The left hip joint space is well-maintained. There is minimal spurring of the femoral head. There is enthesopathy of the visualized pelvis. No significant soft tissue abnormality is seen.       Impression: 1. Mild  degenerative osteoarthritis left hip. 2. Enthesopathy of the pelvis. 3. No acute bony abnormality.      This report was signed and finalized on 4/14/2025 8:51 AM by Dr. Celio Rodriguez MD.      XR Spine Lumbar 2 or 3 View  Result Date: 4/14/2025  Narrative: EXAMINATION: XR SPINE LUMBAR 2 OR 3 -  4/14/2025 7:16 AM  HISTORY: back pain with radiculopathy; M54.9-Dorsalgia, unspecified  Images are stored in PACS per institutional protocol.  3 view lumbar spine series.  The frontal view shows no scoliosis. The SI joints have a normal and symmetric appearance. The visualized portion of the bowel gas pattern is normal. No abnormal calcification is seen and no foreign body is seen.  The lateral view shows moderate endplate spurring at L4 and mild endplate spurring at all other levels.  Mild disc space narrowing at L4-L5. No compression fracture.  At least mild facet arthropathy at L5-S1.      Impression: 1. Degenerative disc and endplate changes greatest at L4-L5. 2. No acute bony abnormality is seen.    This report was signed and finalized on 4/14/2025 8:32 AM by Dr. Jr Oakley MD.        LABORATORY/CULTURE RESULTS:      PATHOLOGY RESULTS:       ASSESSMENT/PLAN     Diagnoses and all orders for this visit:    1. Foot pain, bilateral (Primary)  -     XR Foot 3+ View Bilateral; Future    2. Hallux limitus, acquired, right    3. Hallux limitus, acquired, left    Other orders  -     diclofenac (CATAFLAM) 50 MG tablet; Take 1 tablet by mouth 2 (Two) Times a Day for 90 days.  Dispense: 60 tablet; Refill: 2      Comprehensive lower extremity examination and evaluation was performed.  Discussed findings and treatment plan including risks, benefits, and treatment options with patient in detail. Patient agreed with treatment plan.  Reviewed imaging with patient. 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    I feet that he is laterally loading while walking due to significant arthritis in both great toes.   Initially will attempt  Powerstep Inserts and NSAIDs.   Discussed 1st MTPJ arthrodesis if pain does not improve.  An After Visit Summary was printed and given to the patient at discharge, including (if requested) any available informative/educational handouts regarding diagnosis, treatment, or medications. All questions were answered to patient/family satisfaction. Should symptoms fail to improve or worsen they agree to call or return to clinic or to go to the Emergency Department. Discussed the importance of following up with any needed screening tests/labs/specialist appointments and any requested follow-up recommended by me today. Importance of maintaining follow-up discussed and patient accepts that missed appointments can delay diagnosis and potentially lead to worsening of conditions.  Return if symptoms worsen or fail to improve., or sooner if acute issues arise.      This document has been electronically signed by Cordell Chery DPM on May 9, 2025 08:49 CDT

## 2025-05-08 ENCOUNTER — HOSPITAL ENCOUNTER (OUTPATIENT)
Dept: GENERAL RADIOLOGY | Facility: HOSPITAL | Age: 75
Discharge: HOME | End: 2025-05-08
Admitting: PODIATRIST
Payer: MEDICARE

## 2025-05-08 DIAGNOSIS — M79.671 BILATERAL FOOT PAIN: ICD-10-CM

## 2025-05-08 DIAGNOSIS — M79.672 BILATERAL FOOT PAIN: ICD-10-CM

## 2025-05-08 DIAGNOSIS — M21.612 BILATERAL BUNIONS: ICD-10-CM

## 2025-05-08 DIAGNOSIS — M21.611 BILATERAL BUNIONS: ICD-10-CM

## 2025-05-08 PROCEDURE — 73630 X-RAY EXAM OF FOOT: CPT

## 2025-05-09 ENCOUNTER — OFFICE VISIT (OUTPATIENT)
Age: 75
End: 2025-05-09
Payer: MEDICARE

## 2025-05-09 ENCOUNTER — PATIENT ROUNDING (BHMG ONLY) (OUTPATIENT)
Age: 75
End: 2025-05-09
Payer: MEDICARE

## 2025-05-09 VITALS
WEIGHT: 188 LBS | BODY MASS INDEX: 26.32 KG/M2 | HEIGHT: 71 IN | SYSTOLIC BLOOD PRESSURE: 120 MMHG | DIASTOLIC BLOOD PRESSURE: 70 MMHG | HEART RATE: 55 BPM | OXYGEN SATURATION: 98 %

## 2025-05-09 DIAGNOSIS — M79.671 FOOT PAIN, BILATERAL: Primary | ICD-10-CM

## 2025-05-09 DIAGNOSIS — M20.5X2 HALLUX LIMITUS, ACQUIRED, LEFT: ICD-10-CM

## 2025-05-09 DIAGNOSIS — M79.672 FOOT PAIN, BILATERAL: Primary | ICD-10-CM

## 2025-05-09 DIAGNOSIS — M20.5X1 HALLUX LIMITUS, ACQUIRED, RIGHT: ICD-10-CM

## 2025-05-09 RX ORDER — DICLOFENAC POTASSIUM 50 MG/1
50 TABLET, FILM COATED ORAL 2 TIMES DAILY
Qty: 60 TABLET | Refills: 2 | Status: SHIPPED | OUTPATIENT
Start: 2025-05-09 | End: 2025-08-07

## 2025-05-09 NOTE — PROGRESS NOTES
May 9, 2025    Hello, may I speak with Geoff Edwards?    My name is Beena      I am  with [facility]  [address]  [facility] [address] [phone].    Before we get started may I verify your date of birth? 1950    I am calling to officially welcome you to our practice and ask about your recent visit. Is this a good time to talk? yes    Tell me about your visit with us. What things went well?  everyone was friendly and helpful       We're always looking for ways to make our patients' experiences even better. Do you have recommendations on ways we may improve?  no    Overall were you satisfied with your first visit to our practice? yes       I appreciate you taking the time to speak with me today. Is there anything else I can do for you? no      Thank you, and have a great day.

## 2025-05-13 ENCOUNTER — TELEPHONE (OUTPATIENT)
Dept: INTERNAL MEDICINE | Facility: CLINIC | Age: 75
End: 2025-05-13

## 2025-05-13 NOTE — TELEPHONE ENCOUNTER
Patient called said he picked up prescription today from [facility] for Amlodipine 5mg prescribed by Dr. Hairston. Patient also has Amlodipine 10mg prescribed by Dr. Dixon. He is unsure what he is supposed to be taking. He did say Dr. Dixon mentioned increasing his dosage to 15mg. Please call patient back with instructions at [phone]

## 2025-05-19 ENCOUNTER — LAB (OUTPATIENT)
Dept: LAB | Facility: HOSPITAL | Age: 75
End: 2025-05-19
Payer: MEDICARE

## 2025-05-19 ENCOUNTER — TELEPHONE (OUTPATIENT)
Dept: UROLOGY | Facility: CLINIC | Age: 75
End: 2025-05-19
Payer: MEDICARE

## 2025-05-19 DIAGNOSIS — R97.20 ELEVATED PROSTATE SPECIFIC ANTIGEN (PSA): ICD-10-CM

## 2025-05-19 PROCEDURE — 84153 ASSAY OF PSA TOTAL: CPT

## 2025-05-19 PROCEDURE — 36415 COLL VENOUS BLD VENIPUNCTURE: CPT

## 2025-05-20 LAB — PSA SERPL-MCNC: 0.94 NG/ML (ref 0–4)

## 2025-05-22 ENCOUNTER — OFFICE VISIT (OUTPATIENT)
Dept: UROLOGY | Facility: CLINIC | Age: 75
End: 2025-05-22
Payer: MEDICARE

## 2025-05-22 VITALS — HEIGHT: 71 IN | TEMPERATURE: 97.6 F | BODY MASS INDEX: 26.6 KG/M2 | WEIGHT: 190 LBS

## 2025-05-22 DIAGNOSIS — N13.8 BPH WITH URINARY OBSTRUCTION: ICD-10-CM

## 2025-05-22 DIAGNOSIS — R97.20 ELEVATED PROSTATE SPECIFIC ANTIGEN (PSA): Primary | ICD-10-CM

## 2025-05-22 DIAGNOSIS — N40.1 BPH WITH URINARY OBSTRUCTION: ICD-10-CM

## 2025-05-22 LAB
BILIRUB BLD-MCNC: NEGATIVE MG/DL
CLARITY, POC: CLEAR
COLOR UR: YELLOW
GLUCOSE UR STRIP-MCNC: NEGATIVE MG/DL
KETONES UR QL: NEGATIVE
LEUKOCYTE EST, POC: NEGATIVE
NITRITE UR-MCNC: NEGATIVE MG/ML
PH UR: 5.5 [PH] (ref 5–8)
PROT UR STRIP-MCNC: ABNORMAL MG/DL
RBC # UR STRIP: ABNORMAL /UL
SP GR UR: 1.02 (ref 1–1.03)
UROBILINOGEN UR QL: NORMAL

## (undated) DEVICE — MSK O2 MD CONCENTR A/ LF 7FT 1P/U

## (undated) DEVICE — PINNACLE INTRODUCER SHEATH: Brand: PINNACLE

## (undated) DEVICE — SPNG GZ WOVN 4X4IN 12PLY 10/BX STRL

## (undated) DEVICE — ST FLD IRR WARM

## (undated) DEVICE — TOWEL,OR,DSP,ST,BLUE,STD,4/PK,20PK/CS: Brand: MEDLINE

## (undated) DEVICE — GW PRESSUREWIRE X WIRELESS FFR 175CM

## (undated) DEVICE — DOVER HYDROGEL COATED LATEX FOLEY CATHETER, 30 ML, 3-WAY 24 FR/CH (8.0 MM): Brand: DOVER

## (undated) DEVICE — ENDOGATOR AUXILIARY WATER JET CONNECTOR: Brand: ENDOGATOR

## (undated) DEVICE — HLDR PROB URONAV

## (undated) DEVICE — PK TURNOVER CYSTO RM

## (undated) DEVICE — GLV SURG BIOGEL M LTX PF 7 1/2

## (undated) DEVICE — CANN CO2/O2 NASL A/

## (undated) DEVICE — COPILOT BLEEDBACK CONTROL VALVE: Brand: COPILOT

## (undated) DEVICE — SENSR O2 OXIMAX FNGR A/ 18IN NONSTR

## (undated) DEVICE — MYNXGRIP 6F/7F: Brand: MYNXGRIP

## (undated) DEVICE — PK CATH CARD 30 CA/4

## (undated) DEVICE — SYRINGE,PISTON,IRRIGATION,60ML,STERILE: Brand: MEDLINE

## (undated) DEVICE — MODEL AT P65, P/N 701554-001KIT CONTENTS: HAND CONTROLLER, 3-WAY HIGH-PRESSURE STOPCOCK WITH ROTATING END AND PREMIUM HIGH-PRESSURE TUBING: Brand: ANGIOTOUCH® KIT

## (undated) DEVICE — GC 7F 078 XB 3.5: Brand: VISTA BRITE TIP

## (undated) DEVICE — EVAC BLDR UROVAC W ADAPT

## (undated) DEVICE — MARKR SKIN W/RULR AND LBL

## (undated) DEVICE — TOOL INSRT GW MTL OR PLSTC

## (undated) DEVICE — BAG,DRAINAGE,4L,A/R TOWER,LL,SLIDE TAP: Brand: MEDLINE

## (undated) DEVICE — THE CHANNEL CLEANING BRUSH IS A NYLON FLEXI BRUSH ATTACHED TO A FLEXIBLE PLASTIC SHEATH DESIGNED TO SAFELY REMOVE DEBRIS FROM FLEXIBLE ENDOSCOPES.

## (undated) DEVICE — MODEL BT2000 P/N 700287-012KIT CONTENTS: MANIFOLD WITH SALINE AND CONTRAST PORTS, SALINE TUBING WITH SPIKE AND HAND SYRINGE, TRANSDUCER: Brand: BT2000 AUTOMATED MANIFOLD KIT

## (undated) DEVICE — INTENT TO BE USED WITH SUTURE MATERIAL FOR TISSUE CLOSURE: Brand: RICHARD-ALLAN® NEEDLE 1/2 CIRCLE TROCAR

## (undated) DEVICE — ELECTRD SUPERSECT FRNT LOAD 5PK

## (undated) DEVICE — PAD,PREPPING,CUFFED,24X48,7",NONSTERILE: Brand: MEDLINE

## (undated) DEVICE — SOLIDIFIER LIQUI LOC PLUS 2000CC

## (undated) DEVICE — TBG SMPL FLTR LINE NASL 02/C02 A/ BX/100

## (undated) DEVICE — Device: Brand: DEFENDO AIR/WATER/SUCTION AND BIOPSY VALVE

## (undated) DEVICE — PAD, DEFIB, ADULT, RADIOTRANS, PHILIPS: Brand: MEDLINE

## (undated) DEVICE — MAX-CORE® DISPOSABLE CORE BIOPSY INSTRUMENT, 18G X 25CM: Brand: MAX-CORE

## (undated) DEVICE — PK TURNOVER RM ADV

## (undated) DEVICE — PK CYSTO 30

## (undated) DEVICE — SNAP KOVER: Brand: UNBRANDED

## (undated) DEVICE — FR5 INFINITI MULTIPAC: Brand: INFINITI

## (undated) DEVICE — GW STARTER FXD CORE J .035 3X150CM 3MM

## (undated) DEVICE — CUFF,BP,DISP,1 TUBE,ADULT,HP: Brand: MEDLINE